# Patient Record
Sex: FEMALE | Race: WHITE | NOT HISPANIC OR LATINO | Employment: OTHER | URBAN - NONMETROPOLITAN AREA
[De-identification: names, ages, dates, MRNs, and addresses within clinical notes are randomized per-mention and may not be internally consistent; named-entity substitution may affect disease eponyms.]

---

## 2017-03-21 ENCOUNTER — TRANSFERRED RECORDS (OUTPATIENT)
Dept: HEALTH INFORMATION MANAGEMENT | Facility: HOSPITAL | Age: 82
End: 2017-03-21

## 2017-03-22 DIAGNOSIS — M54.50 LOW BACK PAIN: Primary | ICD-10-CM

## 2017-03-22 DIAGNOSIS — M54.16 LUMBAR RADICULOPATHY: ICD-10-CM

## 2017-03-23 ENCOUNTER — HOSPITAL ENCOUNTER (OUTPATIENT)
Dept: MRI IMAGING | Facility: HOSPITAL | Age: 82
Discharge: HOME | End: 2017-03-23
Attending: FAMILY MEDICINE | Admitting: FAMILY MEDICINE
Payer: MEDICARE

## 2017-03-23 PROCEDURE — 72148 MRI LUMBAR SPINE W/O DYE: CPT | Mod: TC

## 2017-03-24 DIAGNOSIS — M51.26 PROTRUDED LUMBAR DISC: Primary | ICD-10-CM

## 2017-03-27 ENCOUNTER — RESULTS ONLY (OUTPATIENT)
Dept: ADMINISTRATIVE | Facility: CLINIC | Age: 82
End: 2017-03-27

## 2017-03-27 ENCOUNTER — HOSPITAL ENCOUNTER (OUTPATIENT)
Dept: INTERVENTIONAL RADIOLOGY/VASCULAR | Facility: HOSPITAL | Age: 82
End: 2017-03-27
Attending: FAMILY MEDICINE
Payer: MEDICARE

## 2017-03-27 ENCOUNTER — HOSPITAL ENCOUNTER (OUTPATIENT)
Dept: MRI IMAGING | Facility: HOSPITAL | Age: 82
End: 2017-03-27
Attending: FAMILY MEDICINE
Payer: MEDICARE

## 2017-03-27 ENCOUNTER — HOSPITAL ENCOUNTER (OUTPATIENT)
Dept: GENERAL RADIOLOGY | Facility: HOSPITAL | Age: 82
Discharge: HOME | End: 2017-03-27
Attending: FAMILY MEDICINE | Admitting: FAMILY MEDICINE
Payer: MEDICARE

## 2017-03-27 DIAGNOSIS — M54.2 NECK PAIN: Primary | ICD-10-CM

## 2017-03-27 DIAGNOSIS — M54.12 CERVICAL RADICULOPATHY: ICD-10-CM

## 2017-03-27 PROCEDURE — 72141 MRI NECK SPINE W/O DYE: CPT | Mod: TC

## 2017-03-27 PROCEDURE — 99212 OFFICE O/P EST SF 10 MIN: CPT | Mod: TC | Performed by: RADIOLOGY

## 2017-03-27 NOTE — PROGRESS NOTES
"Pain Management Referral Consult    PATIENT HISTORICAL:    Patient Anatomy/region of concern:  lumbar    When and how did your pain begin?   Date 2 weeks ago  Eventno injury    Location of the pain? Lt side buttock pain, lt leg pain    Describe what pain feels like: constant  Does it interfere with daily activities? yes    What makes your pain better? Pain meds/ heating pad      Focal tenderness at one point along the length of a taut band (\"knotted muscle\")?  Yes    Restricted range of motion of the involved muscle or joint?  Yes    Did you have a previous injection series where you found relief of at least 3 months?  Yes  Have you had surgery in the area of pain?   No  If yes, when was the surgery?    What treatments have you already had for your pain?   Add length of time in weeks for all that apply.    Treatment Tried it--helped Tried it-- nohelp Made it worse   Pain clinic      Physical therapy      Chiropractic care      Spine injections  worked     Other nerve blocks      Surgery      Exercise      Others (list):                Injection Documentation of Provider History    PER-PROVIDER HISTORY, Dr. stacia lagos  Is this for treatment of acute herpes zoster, post herpetic neuralgia, post-decompressive radiculitis, or post-surgical scarring?   No  Does the patient have radiculopathy or sciatica?  Yes  How long has the patient had any physical therapy, home therapy or chiropractor care?  >4 weeks.  How long has the patient taken NSaids or muscle relaxants?  0 weeks.  Is there any history of systemic/local infection or unstable medical conditions?  No    "

## 2017-03-31 ENCOUNTER — HOSPITAL ENCOUNTER (OUTPATIENT)
Dept: INTERVENTIONAL RADIOLOGY/VASCULAR | Facility: HOSPITAL | Age: 82
End: 2017-03-31
Attending: FAMILY MEDICINE
Payer: MEDICARE

## 2017-03-31 DIAGNOSIS — M54.12 CERVICAL RADICULOPATHY: ICD-10-CM

## 2017-03-31 DIAGNOSIS — M54.2 NECK PAIN: Primary | ICD-10-CM

## 2017-03-31 PROCEDURE — 62321 NJX INTERLAMINAR CRV/THRC: CPT | Mod: TC

## 2017-03-31 PROCEDURE — 25000128 H RX IP 250 OP 636: Performed by: RADIOLOGY

## 2017-03-31 RX ORDER — METHYLPREDNISOLONE ACETATE 80 MG/ML
INJECTION, SUSPENSION INTRA-ARTICULAR; INTRALESIONAL; INTRAMUSCULAR; SOFT TISSUE
Status: DISCONTINUED
Start: 2017-03-31 | End: 2017-04-01 | Stop reason: HOSPADM

## 2017-03-31 RX ORDER — IOPAMIDOL 612 MG/ML
15 INJECTION, SOLUTION INTRATHECAL ONCE
Status: COMPLETED | OUTPATIENT
Start: 2017-03-31 | End: 2017-03-31

## 2017-03-31 RX ORDER — METHYLPREDNISOLONE ACETATE 80 MG/ML
80 INJECTION, SUSPENSION INTRA-ARTICULAR; INTRALESIONAL; INTRAMUSCULAR; SOFT TISSUE ONCE
Status: COMPLETED | OUTPATIENT
Start: 2017-03-31 | End: 2017-03-31

## 2017-03-31 RX ADMIN — METHYLPREDNISOLONE ACETATE 80 MG: 80 INJECTION, SUSPENSION INTRA-ARTICULAR; INTRALESIONAL; INTRAMUSCULAR; SOFT TISSUE at 13:02

## 2017-03-31 RX ADMIN — IOPAMIDOL 3 ML: 612 INJECTION, SOLUTION INTRATHECAL at 13:03

## 2017-03-31 NOTE — IP AVS SNAPSHOT
[facility]    [address] [place] [address]    Phone:  [phone]    Fax:  [phone]                                       After Visit Summary   3/31/2017    Kary Moody    MRN: [id number]           After Visit Summary Signature Page     I have received my discharge instructions, and my questions have been answered. I have discussed any challenges I see with this plan with the nurse or doctor.    ..........................................................................................................................................  Patient/Patient Representative Signature      ..........................................................................................................................................  Patient Representative Print Name and Relationship to Patient    ..................................................               ................................................  Date                                            Time    ..........................................................................................................................................  Reviewed by Signature/Title    ...................................................              ..............................................  Date                                                            Time

## 2017-03-31 NOTE — IP AVS SNAPSHOT
MRN:[id number]                      After Visit Summary   3/31/2017    Kary Moody    MRN: [id number]           Visit Information        Provider Department      3/31/2017 12:30 PM Radiologist, Need Interventional; [facility]           Review of your medicines      UNREVIEWED medicines. Ask your doctor about these medicines        Dose / Directions    ARMOUR THYROID 90 MG Tabs   Generic drug:  Thyroid        Dose:  75 mg   Take 75 mg by mouth daily   Refills:  0       aspirin 81 MG tablet        Dose:  81 mg   Take 81 mg by mouth daily   Refills:  0       FOLIC ACID PO        Dose:  1 mg   Take 1 mg by mouth 3 times daily   Refills:  0       magnesium hydroxide 400 MG/5ML suspension   Commonly known as:  MILK OF MAGNESIA   Used for:  Constipation, unspecified constipation type        Dose:  30 mL   Take 30 mLs by mouth daily as needed for constipation   Quantity:  105 mL   Refills:  0       METOPROLOL TARTRATE PO        Dose:  50 mg   Take 50 mg by mouth 2 times daily   Refills:  0       NITROTAB SL        Dose:  0.4 mg   Place 0.4 mg under the tongue every 5 minutes as needed for chest pain   Refills:  0       PLAVIX PO        Dose:  75 mg   Take 75 mg by mouth daily   Refills:  0       senna-docusate 8.6-50 MG per tablet   Commonly known as:  SENOKOT-S;PERICOLACE   Used for:  Constipation, unspecified constipation type        Dose:  1-2 tablet   Take 1-2 tablets by mouth 2 times daily as needed (constipation )   Quantity:  30 tablet   Refills:  0       valsartan-hydrochlorothiazide 320-25 MG per tablet   Commonly known as:  DIOVAN-HCT        Dose:  1 tablet   Take 1 tablet by mouth daily   Refills:  0       ZETIA PO        Dose:  10 mg   Take 10 mg by mouth At Bedtime   Refills:  0                Protect others around you: Learn how to safely use, store and throw away your medicines at www.disposemymeds.org.         Follow-ups after your visit       "   Care Instructions        Further instructions from your care team       Home number on file [phone] (home)  Is it ok to leave a message if you are not home yes    Dr Barros completed your ridge cervical procedure on 3/31/2017.    Current Pain Level (0-10 Scale): 6/10  Post Pain Level (0-10):  4/10    Patient will be contacted by a diagnostic imaging nurse via telephone for follow up on pain levels in 2 weeks.    Radiology Discharge instructions for Steroid Injection    Activity Level:     Do not do any heavy activity or exercise for 24 hours.   Do not drive for 4 hours after your injection.  Diet:   Return to your normal diet.  Medications:   If you have stopped taking your Aspirin, Coumadin/Warfarin, Ibuprofen, or any   other blood thinner for this procedure you may resume in the morning unless   your primary care provider has given you other instructions.    Diabetics may see an increase in blood sugar after steroid injections. If you are concerned about your blood sugar, please contact your family doctor.    Site Care:  Remove the bandage and bathe or shower the morning after the procedure.      Call your Primary Care Provider if you have the following (if your primary care provider is not available please seek emergency care):   Nausea with vomiting   Severe headache   Drowsiness or confusion   Redness or drainage at the injection or puncture site   Temperature over 101 degrees F   Other concerns   Worsening back pain   Stiff neck       Additional Information About Your Visit        Show[employer] Information     [employer] lets you send messages to your doctor, view your test results, renew your prescriptions, schedule appointments and more. To sign up, go to [email or web address] . Click on \"Log in\" on the left side of the screen, which will take you to the Welcome page. Then click on \"Sign up Now\" on the right side of the page.     You will be asked to enter the access code listed below, as well as some " personal information. Please follow the directions to create your username and password.     Your access code is: [email or web address]  Expires: 2017 12:53 PM     Your access code will  in 90 days. If you need help or a new code, please call your [facility] clinic or [phone].        Care EveryWhere ID     This is your Care EveryWhere ID. This could be used by other organizations to access your [facility] medical records  [id number]         Primary Care Provider Office Phone # Fax #    Khadra Tariq [phone]      Thank you!     Thank you for choosing [facility] for your care. Our goal is always to provide you with excellent care. Hearing back from our patients is one way we can continue to improve our services. Please take a few minutes to complete the written survey that you may receive in the mail after you visit with us. Thank you!             Medication List: This is a list of all your medications and when to take them. Check marks below indicate your daily home schedule. Keep this list as a reference.      Medications           Morning Afternoon Evening Bedtime As Needed    ARMOUR THYROID 90 MG Tabs   Take 75 mg by mouth daily   Generic drug:  Thyroid                                aspirin 81 MG tablet   Take 81 mg by mouth daily                                FOLIC ACID PO   Take 1 mg by mouth 3 times daily                                magnesium hydroxide 400 MG/5ML suspension   Commonly known as:  MILK OF MAGNESIA   Take 30 mLs by mouth daily as needed for constipation                                METOPROLOL TARTRATE PO   Take 50 mg by mouth 2 times daily                                NITROTAB SL   Place 0.4 mg under the tongue every 5 minutes as needed for chest pain                                PLAVIX PO   Take 75 mg by mouth daily                                senna-docusate 8.6-50 MG per tablet   Commonly known as:  SENOKOT-S;PERICOLACE   Take 1-2 tablets by mouth 2  times daily as needed (constipation )                                valsartan-hydrochlorothiazide 320-25 MG per tablet   Commonly known as:  DIOVAN-HCT   Take 1 tablet by mouth daily                                ZETIA PO   Take 10 mg by mouth At Bedtime

## 2017-03-31 NOTE — PROGRESS NOTES
Fluoro time for this case was 28 seconds, less than 5 min  Was patient held? NO  If yes, by whom? NA

## 2017-03-31 NOTE — DISCHARGE INSTRUCTIONS
Home number on file [phone] (home)  Is it ok to leave a message if you are not home yes    Dr Barros completed your ridge cervical procedure on 3/31/2017.    Current Pain Level (0-10 Scale): 6/10  Post Pain Level (0-10):  4/10    Patient will be contacted by a diagnostic imaging nurse via telephone for follow up on pain levels in 2 weeks.    Radiology Discharge instructions for Steroid Injection    Activity Level:     Do not do any heavy activity or exercise for 24 hours.   Do not drive for 4 hours after your injection.  Diet:   Return to your normal diet.  Medications:   If you have stopped taking your Aspirin, Coumadin/Warfarin, Ibuprofen, or any   other blood thinner for this procedure you may resume in the morning unless   your primary care provider has given you other instructions.    Diabetics may see an increase in blood sugar after steroid injections. If you are concerned about your blood sugar, please contact your family doctor.    Site Care:  Remove the bandage and bathe or shower the morning after the procedure.      Call your Primary Care Provider if you have the following (if your primary care provider is not available please seek emergency care):   Nausea with vomiting   Severe headache   Drowsiness or confusion   Redness or drainage at the injection or puncture site   Temperature over 101 degrees F   Other concerns   Worsening back pain   Stiff neck

## 2017-04-13 ENCOUNTER — TELEPHONE (OUTPATIENT)
Dept: INTERVENTIONAL RADIOLOGY/VASCULAR | Facility: HOSPITAL | Age: 82
End: 2017-04-13

## 2017-04-15 ENCOUNTER — HOSPITAL ENCOUNTER (EMERGENCY)
Facility: HOSPITAL | Age: 82
Discharge: HOME | End: 2017-04-15
Attending: PHYSICIAN ASSISTANT | Admitting: PHYSICIAN ASSISTANT
Payer: MEDICARE

## 2017-04-15 VITALS
TEMPERATURE: 97.6 F | RESPIRATION RATE: 16 BRPM | HEIGHT: 62 IN | WEIGHT: 172 LBS | SYSTOLIC BLOOD PRESSURE: 142 MMHG | OXYGEN SATURATION: 98 % | DIASTOLIC BLOOD PRESSURE: 79 MMHG | BODY MASS INDEX: 31.65 KG/M2

## 2017-04-15 DIAGNOSIS — R46.89 COGNITIVE AND BEHAVIORAL CHANGES: ICD-10-CM

## 2017-04-15 DIAGNOSIS — R41.3 MEMORY CHANGES: ICD-10-CM

## 2017-04-15 DIAGNOSIS — R41.89 COGNITIVE AND BEHAVIORAL CHANGES: ICD-10-CM

## 2017-04-15 LAB
ALBUMIN SERPL-MCNC: 3.2 G/DL (ref 3.4–5)
ALBUMIN UR-MCNC: NEGATIVE MG/DL
ALP SERPL-CCNC: 76 U/L (ref 40–150)
ALT SERPL W P-5'-P-CCNC: 31 U/L (ref 0–50)
ANION GAP SERPL CALCULATED.3IONS-SCNC: 10 MMOL/L (ref 3–14)
APPEARANCE UR: CLEAR
AST SERPL W P-5'-P-CCNC: 26 U/L (ref 0–45)
BACTERIA #/AREA URNS HPF: ABNORMAL /HPF
BASOPHILS # BLD AUTO: 0 10E9/L (ref 0–0.2)
BASOPHILS NFR BLD AUTO: 0.1 %
BILIRUB SERPL-MCNC: 0.5 MG/DL (ref 0.2–1.3)
BILIRUB UR QL STRIP: NEGATIVE
BUN SERPL-MCNC: 10 MG/DL (ref 7–30)
CALCIUM SERPL-MCNC: 8.9 MG/DL (ref 8.5–10.1)
CHLORIDE SERPL-SCNC: 94 MMOL/L (ref 94–109)
CO2 SERPL-SCNC: 27 MMOL/L (ref 20–32)
COLOR UR AUTO: ABNORMAL
CREAT SERPL-MCNC: 0.66 MG/DL (ref 0.52–1.04)
DIFFERENTIAL METHOD BLD: ABNORMAL
EOSINOPHIL # BLD AUTO: 0 10E9/L (ref 0–0.7)
EOSINOPHIL NFR BLD AUTO: 0.6 %
ERYTHROCYTE [DISTWIDTH] IN BLOOD BY AUTOMATED COUNT: 13.2 % (ref 10–15)
GFR SERPL CREATININE-BSD FRML MDRD: 85 ML/MIN/1.7M2
GLUCOSE SERPL-MCNC: 147 MG/DL (ref 70–99)
GLUCOSE UR STRIP-MCNC: NEGATIVE MG/DL
HCT VFR BLD AUTO: 30.5 % (ref 35–47)
HGB BLD-MCNC: 10.6 G/DL (ref 11.7–15.7)
HGB UR QL STRIP: NEGATIVE
IMM GRANULOCYTES # BLD: 0.1 10E9/L (ref 0–0.4)
IMM GRANULOCYTES NFR BLD: 0.7 %
KETONES UR STRIP-MCNC: NEGATIVE MG/DL
LEUKOCYTE ESTERASE UR QL STRIP: NEGATIVE
LYMPHOCYTES # BLD AUTO: 1.3 10E9/L (ref 0.8–5.3)
LYMPHOCYTES NFR BLD AUTO: 18 %
MCH RBC QN AUTO: 28.5 PG (ref 26.5–33)
MCHC RBC AUTO-ENTMCNC: 34.8 G/DL (ref 31.5–36.5)
MCV RBC AUTO: 82 FL (ref 78–100)
MONOCYTES # BLD AUTO: 1.2 10E9/L (ref 0–1.3)
MONOCYTES NFR BLD AUTO: 17.3 %
NEUTROPHILS # BLD AUTO: 4.4 10E9/L (ref 1.6–8.3)
NEUTROPHILS NFR BLD AUTO: 63.3 %
NITRATE UR QL: NEGATIVE
NRBC # BLD AUTO: 0 10*3/UL
NRBC BLD AUTO-RTO: 0 /100
PH UR STRIP: 7 PH (ref 4.7–8)
PLATELET # BLD AUTO: 165 10E9/L (ref 150–450)
POTASSIUM SERPL-SCNC: 3.9 MMOL/L (ref 3.4–5.3)
PROT SERPL-MCNC: 6.6 G/DL (ref 6.8–8.8)
RBC # BLD AUTO: 3.72 10E12/L (ref 3.8–5.2)
RBC #/AREA URNS AUTO: 0 /HPF (ref 0–2)
SODIUM SERPL-SCNC: 131 MMOL/L (ref 133–144)
SP GR UR STRIP: 1 (ref 1–1.03)
TSH SERPL DL<=0.05 MIU/L-ACNC: 0.66 MU/L (ref 0.4–4)
URN SPEC COLLECT METH UR: ABNORMAL
UROBILINOGEN UR STRIP-MCNC: NORMAL MG/DL (ref 0–2)
WBC # BLD AUTO: 7 10E9/L (ref 4–11)
WBC #/AREA URNS AUTO: <1 /HPF (ref 0–2)

## 2017-04-15 PROCEDURE — 99284 EMERGENCY DEPT VISIT MOD MDM: CPT | Mod: 25

## 2017-04-15 PROCEDURE — 85025 COMPLETE CBC W/AUTO DIFF WBC: CPT | Performed by: PHYSICIAN ASSISTANT

## 2017-04-15 PROCEDURE — 70450 CT HEAD/BRAIN W/O DYE: CPT | Mod: TC

## 2017-04-15 PROCEDURE — 84443 ASSAY THYROID STIM HORMONE: CPT | Performed by: PHYSICIAN ASSISTANT

## 2017-04-15 PROCEDURE — 36415 COLL VENOUS BLD VENIPUNCTURE: CPT | Performed by: PHYSICIAN ASSISTANT

## 2017-04-15 PROCEDURE — 99284 EMERGENCY DEPT VISIT MOD MDM: CPT | Performed by: PHYSICIAN ASSISTANT

## 2017-04-15 PROCEDURE — 81001 URINALYSIS AUTO W/SCOPE: CPT | Performed by: PHYSICIAN ASSISTANT

## 2017-04-15 PROCEDURE — 80053 COMPREHEN METABOLIC PANEL: CPT | Performed by: PHYSICIAN ASSISTANT

## 2017-04-15 ASSESSMENT — ENCOUNTER SYMPTOMS
ABDOMINAL PAIN: 0
LIGHT-HEADEDNESS: 0
CONFUSION: 1
CHILLS: 0
SHORTNESS OF BREATH: 0
HEADACHES: 0
AGITATION: 1
SLEEP DISTURBANCE: 0
DIZZINESS: 0
NERVOUS/ANXIOUS: 0
COUGH: 0
FEVER: 0
HALLUCINATIONS: 0

## 2017-04-15 NOTE — ED PROVIDER NOTES
"  History     Chief Complaint   Patient presents with     Confusion     The history is provided by the patient, the spouse and a relative.     Kary Moody is a 83 year old female who presented to the ED along with family for evaluation of some recent confusion and abnormal behavior.   reports that over the last few weeks, Kary's short term memory has been slipping.  Daughter reports that last night and this morning they noticed that she could not recall simple things such as her son's name and other family names.  She could not work a puzzle piece.  She was hiding from her son, etc.  No falls. No recent illness.  She does have a history of CVA and is currently on Plavix and ASA.  During my interview, Kary was pleasant and talkative.  She seemed overly flirtatious.  She denied any concerns to me other than that she could not recall a granddaughter's name.  Kary tells me a hx of CVA and TIA as well.          I have reviewed the Medications, Allergies, Past Medical and Surgical History, and Social History in the Epic system.    Review of Systems   Constitutional: Negative for chills and fever.   Respiratory: Negative for cough and shortness of breath.    Gastrointestinal: Negative for abdominal pain.   Skin: Negative.    Neurological: Negative for dizziness, light-headedness and headaches.   Psychiatric/Behavioral: Positive for agitation, behavioral problems and confusion. Negative for hallucinations, sleep disturbance and suicidal ideas. The patient is not nervous/anxious.        Physical Exam   BP: 156/66  Heart Rate: 76  Temp: 97.6  F (36.4  C)  Resp: 16  Height: 157.5 cm (5' 2\")  Weight: 78 kg (172 lb)  SpO2: 98 %  Physical Exam   Constitutional: She is oriented to person, place, and time. She appears well-developed and well-nourished. No distress.   Pleasant and talkative    Cardiovascular: Normal rate and regular rhythm.    Pulmonary/Chest: Effort normal.   Abdominal: Soft. "   Neurological: She is alert and oriented to person, place, and time.   No focal concerns.  Normal speech.  No drift.  Normal gait.     Skin: Skin is warm and dry.   Psychiatric: She has a normal mood and affect.   Nursing note and vitals reviewed.      ED Course     ED Course     Procedures        Medications - No data to display     Results for orders placed or performed during the hospital encounter of 04/15/17 (from the past 24 hour(s))   CBC with platelets differential   Result Value Ref Range    WBC 7.0 4.0 - 11.0 10e9/L    RBC Count 3.72 (L) 3.8 - 5.2 10e12/L    Hemoglobin 10.6 (L) 11.7 - 15.7 g/dL    Hematocrit 30.5 (L) 35.0 - 47.0 %    MCV 82 78 - 100 fl    MCH 28.5 26.5 - 33.0 pg    MCHC 34.8 31.5 - 36.5 g/dL    RDW 13.2 10.0 - 15.0 %    Platelet Count 165 150 - 450 10e9/L    Diff Method Automated Method     % Neutrophils 63.3 %    % Lymphocytes 18.0 %    % Monocytes 17.3 %    % Eosinophils 0.6 %    % Basophils 0.1 %    % Immature Granulocytes 0.7 %    Nucleated RBCs 0 0 /100    Absolute Neutrophil 4.4 1.6 - 8.3 10e9/L    Absolute Lymphocytes 1.3 0.8 - 5.3 10e9/L    Absolute Monocytes 1.2 0.0 - 1.3 10e9/L    Absolute Eosinophils 0.0 0.0 - 0.7 10e9/L    Absolute Basophils 0.0 0.0 - 0.2 10e9/L    Abs Immature Granulocytes 0.1 0 - 0.4 10e9/L    Absolute Nucleated RBC 0.0    Comprehensive metabolic panel   Result Value Ref Range    Sodium 131 (L) 133 - 144 mmol/L    Potassium 3.9 3.4 - 5.3 mmol/L    Chloride 94 94 - 109 mmol/L    Carbon Dioxide 27 20 - 32 mmol/L    Anion Gap 10 3 - 14 mmol/L    Glucose 147 (H) 70 - 99 mg/dL    Urea Nitrogen 10 7 - 30 mg/dL    Creatinine 0.66 0.52 - 1.04 mg/dL    GFR Estimate 85 >60 mL/min/1.7m2    GFR Estimate If Black >90   GFR Calc   >60 mL/min/1.7m2    Calcium 8.9 8.5 - 10.1 mg/dL    Bilirubin Total 0.5 0.2 - 1.3 mg/dL    Albumin 3.2 (L) 3.4 - 5.0 g/dL    Protein Total 6.6 (L) 6.8 - 8.8 g/dL    Alkaline Phosphatase 76 40 - 150 U/L    ALT 31 0 - 50 U/L    AST  26 0 - 45 U/L   TSH   Result Value Ref Range    TSH 0.66 0.40 - 4.00 mU/L   UA reflex to Microscopic   Result Value Ref Range    Color Urine Light Yellow     Appearance Urine Clear     Glucose Urine Negative NEG mg/dL    Bilirubin Urine Negative NEG    Ketones Urine Negative NEG mg/dL    Specific Gravity Urine 1.003 1.003 - 1.035    Blood Urine Negative NEG    pH Urine 7.0 4.7 - 8.0 pH    Protein Albumin Urine Negative NEG mg/dL    Urobilinogen mg/dL Normal 0.0 - 2.0 mg/dL    Nitrite Urine Negative NEG    Leukocyte Esterase Urine Negative NEG    Source Midstream Urine     RBC Urine 0 0 - 2 /HPF    WBC Urine <1 0 - 2 /HPF    Bacteria Urine None (A) NEG /HPF        Critical Care time:  none               Labs Ordered and Resulted from Time of ED Arrival Up to the Time of Departure from the ED   CBC WITH PLATELETS DIFFERENTIAL - Abnormal; Notable for the following:        Result Value    RBC Count 3.72 (*)     Hemoglobin 10.6 (*)     Hematocrit 30.5 (*)     All other components within normal limits   COMPREHENSIVE METABOLIC PANEL - Abnormal; Notable for the following:     Sodium 131 (*)     Glucose 147 (*)     Albumin 3.2 (*)     Protein Total 6.6 (*)     All other components within normal limits   URINE MACROSCOPIC WITH REFLEX TO MICRO - Abnormal; Notable for the following:     Bacteria Urine None (*)     All other components within normal limits   TSH       Assessments & Plan (with Medical Decision Making)   Alzheimer's clock was abnormal.  Minute hand not at 12.  Mini Mental was an 18.  CT head shows chronic changes.  Symptoms are likely secondary to neurodegeneration.  Daughter does not live in town and is up visiting.  Symptoms have likely been more progressive than daughter believes. Kary lives with her  and has other family close.  She feels safe at home.  I doubt this could be from CVAs or the like but is already on Plavix and ASA.  No emergent concerns.  I would not consider her vulnerable at this  point.  I stressed the importance of proper clinic follow-up.  I also stressed the she return HERE for ANY worsening symptoms or other concerns whatsoever.  Ms. Moody voiced complete understanding and were agreeable. She was discharged in stable condition, ambulatory without assistance.     I have reviewed the nursing notes.    I have reviewed the findings, diagnosis, plan and need for follow up with the patient.    New Prescriptions    No medications on file       Final diagnoses:   Memory changes   Cognitive and behavioral changes       4/15/2017   [facility]     Altaf Uribe PA-C  04/15/17 2935

## 2017-04-15 NOTE — ED NOTES
"Ambulated to bathroom independently with this nurse at side. Explained to patient in detail to use 3 wipes. Patient repeatedly stated, \"I know, I know, three wipes, I know.\" After patient had not used wipes. Patient stated, \"I'm sorry about the wipes but I have more proof of who I am, this beautiful wedding ring my  got me.\" Ambulated back to room. Gait steady. Daughter and  remain at bedside.  "

## 2017-04-15 NOTE — ED AVS SNAPSHOT
[facility]    [address] [address]    Phone:  [phone]                                       Kary Moody   MRN: [id number]    Department:  [facility]   Date of Visit:  4/15/2017           After Visit Summary Signature Page     I have received my discharge instructions, and my questions have been answered. I have discussed any challenges I see with this plan with the nurse or doctor.    ..........................................................................................................................................  Patient/Patient Representative Signature      ..........................................................................................................................................  Patient Representative Print Name and Relationship to Patient    ..................................................               ................................................  Date                                            Time    ..........................................................................................................................................  Reviewed by Signature/Title    ...................................................              ..............................................  Date                                                            Time

## 2017-04-15 NOTE — ED AVS SNAPSHOT
[facility]    [address] [address]    Phone:  [phone]                                       Kary Moody   MRN: [id number]    Department:  [facility]   Date of Visit:  4/15/2017           Patient Information     Date Of Birth          11/8/1933        Your diagnoses for this visit were:     Memory changes     Cognitive and behavioral changes        You were seen by Altaf Uribe PA-C.      Follow-up Information     Schedule an appointment as soon as possible for a visit with Khadra Tariq MD.    Specialty:  Family Practice    Contact information:    [facility]  [address] [facility] [address] [phone]          Follow up with [facility].    Specialty:  EMERGENCY MEDICINE    Why:  If symptoms worsen    Contact information:    [address] [address] [phone]    Additional information:    From [place]: Take [address]. Turn left at [address]. Turn left at the first stoplight on [address]. At the first stop sign, take a right onto [address]. Take a left into the parking lot and continue through until you reach the North enterance of the building.       From [place]: Take [address]. Take the [address] ramp towards [place]. Turn left onto [address]. Take a slight right onto [address]. Turn left at the first stoplight on [address]. At the first stop sign, take a right onto [address]. Take a left into the parking lot and continue through until you reach the North enterance of the building.       From [place]: Take [address]. Take a right at [address]. At the first stop sign, take a right onto [address]. Take a left into the parking lot and continue through until you reach the North enterance of the building.         Discharge Instructions       As we discussed, your labs today were all  pretty good.  The CT scan showed chronic microvascular changes.      I don't know what caused your symptoms over the last few days, but it may be secondary to brain changes over time such as Alzheimer's disease or the like.     You are currently on 2 blood thinning medications and there is no reasonable indication to start a different medications or to stop current medications.      Please follow-up with Dr. Tariq early next week for recheck.    Please return HERE for ANY worsening symptoms or other concerns whatsoever.     I hope you can have a good Easter.       Discharge References/Attachments     CONFUSION (ENGLISH)    COPING WITH MEMORY LOSS (ENGLISH)         Review of your medicines      Our records show that you are taking the medicines listed below. If these are incorrect, please call your family doctor or clinic.        Dose / Directions Last dose taken    ARMOUR THYROID 90 MG Tabs   Dose:  75 mg   Generic drug:  Thyroid        Take 75 mg by mouth daily   Refills:  0        aspirin 81 MG tablet   Dose:  81 mg        Take 81 mg by mouth daily   Refills:  0        FOLIC ACID PO   Dose:  1 mg        Take 1 mg by mouth 3 times daily   Refills:  0        magnesium hydroxide 400 MG/5ML suspension   Commonly known as:  MILK OF MAGNESIA   Dose:  30 mL   Quantity:  105 mL        Take 30 mLs by mouth daily as needed for constipation   Refills:  0        METOPROLOL TARTRATE PO   Dose:  50 mg        Take 50 mg by mouth 2 times daily   Refills:  0        NITROTAB SL   Dose:  0.4 mg        Place 0.4 mg under the tongue every 5 minutes as needed for chest pain   Refills:  0        PLAVIX PO   Dose:  75 mg        Take 75 mg by mouth daily   Refills:  0        senna-docusate 8.6-50 MG per tablet   Commonly known as:  SENOKOT-S;PERICOLACE   Dose:  1-2 tablet   Quantity:  30 tablet        Take 1-2 tablets by mouth 2 times daily as needed (constipation )   Refills:  0                Procedures and tests performed  "during your visit     CBC with platelets differential    CT Head w/o Contrast    Comprehensive metabolic panel    TSH    UA reflex to Microscopic      Orders Needing Specimen Collection     None      Pending Results     Date and Time Order Name Status Description    4/15/2017 1147 CT Head w/o Contrast In process             Pending Culture Results     No orders found from 2017 to 2017.            Thank you for choosing [facility]       Thank you for choosing [facility] for your care. Our goal is always to provide you with excellent care. Hearing back from our patients is one way we can continue to improve our services. Please take a few minutes to complete the written survey that you may receive in the mail after you visit with us. Thank you!        [email or web address] Information     [facility] lets you send messages to your doctor, view your test results, renew your prescriptions, schedule appointments and more. To sign up, go to [email or web address] . Click on \"Log in\" on the left side of the screen, which will take you to the Welcome page. Then click on \"Sign up Now\" on the right side of the page.     You will be asked to enter the access code listed below, as well as some personal information. Please follow the directions to create your username and password.     Your access code is: [email or web address]  Expires: 2017 12:53 PM     Your access code will  in 90 days. If you need help or a new code, please call your [facility] clinic or [phone].        Care EveryWhere ID     This is your Care EveryWhere ID. This could be used by other organizations to access your [facility] medical records  [id number]        After Visit Summary       This is your record. Keep this with you and show to your community pharmacist(s) and doctor(s) at your next visit.                  "

## 2017-04-15 NOTE — DISCHARGE INSTRUCTIONS
As we discussed, your labs today were all pretty good.  The CT scan showed chronic microvascular changes.      I don't know what caused your symptoms over the last few days, but it may be secondary to brain changes over time such as Alzheimer's disease or the like.     You are currently on 2 blood thinning medications and there is no reasonable indication to start a different medications or to stop current medications.      Please follow-up with Dr. Tariq early next week for recheck.    Please return HERE for ANY worsening symptoms or other concerns whatsoever.     I hope you can have a good Easter.

## 2017-04-15 NOTE — ED NOTES
Family brought pt in to be checked out, reported that over the last few months she has been having memory issues and have been getting progressively worse.  They report some long term memory is intact but short term memory she's been confused-example patient couldn't remember her kids names and couldn't remember a puzzle piece. Patient answered all question approp in triage.  Denied pain-was alert and ornt.  FAST negative.

## 2017-04-15 NOTE — ED NOTES
Pt has left with out instructions or vitals.  Daughter returned for instructions and instructions given.  Encouraged to return if worse.

## 2017-04-15 NOTE — ED NOTES
"Ambulated into ED room 7 independently with family at side. Family states patient started to be very confused one hour ago but is now getting better. Daughter states she went for a 20 minute walk and when she returned patient had taken her morning medications and did not know who her children were, what cars were, what puzzle pieces were, and what day it was. Patient is also showing anger and child like behaviors. Stated just now, \"I want to know why that med was hidden right now, tell me now or I'll scream.\" Neuros WNL. FAST exam negative.  and daughter at bedside and both state she is doing better now but the confusion one hour ago and the anger was not at all like her. Call light within reach.  "

## 2017-04-17 ENCOUNTER — HOSPITAL ENCOUNTER (EMERGENCY)
Facility: HOSPITAL | Age: 82
Discharge: HOME | End: 2017-04-17
Attending: EMERGENCY MEDICINE | Admitting: EMERGENCY MEDICINE
Payer: MEDICARE

## 2017-04-17 VITALS
RESPIRATION RATE: 18 BRPM | DIASTOLIC BLOOD PRESSURE: 66 MMHG | OXYGEN SATURATION: 98 % | TEMPERATURE: 97.3 F | SYSTOLIC BLOOD PRESSURE: 138 MMHG | HEART RATE: 92 BPM

## 2017-04-17 DIAGNOSIS — D64.9 NORMOCYTIC ANEMIA: ICD-10-CM

## 2017-04-17 DIAGNOSIS — R46.89 BEHAVIORAL CHANGE: ICD-10-CM

## 2017-04-17 DIAGNOSIS — E87.1 HYPONATREMIA: ICD-10-CM

## 2017-04-17 PROCEDURE — 99283 EMERGENCY DEPT VISIT LOW MDM: CPT

## 2017-04-17 PROCEDURE — 99283 EMERGENCY DEPT VISIT LOW MDM: CPT | Performed by: EMERGENCY MEDICINE

## 2017-04-17 RX ORDER — POLYETHYLENE GLYCOL 3350 17 G/17G
1 POWDER, FOR SOLUTION ORAL DAILY
COMMUNITY

## 2017-04-17 NOTE — PROGRESS NOTES
CT Head w/o Contrast report routed to PCP, Dr. CHON Tariq.  Impression - white matter changes are similar in appearance suggesting small-vessel disease.  Air-fluid level in the right sphenoid locule suggesting sinusitis.  Pt was seen in ED on 4/15 for confusion.  Provider stressed the importance of following up with PCP.

## 2017-04-18 ASSESSMENT — ENCOUNTER SYMPTOMS
TREMORS: 0
SEIZURES: 0
FEVER: 0
SPEECH DIFFICULTY: 0
SLEEP DISTURBANCE: 1
CARDIOVASCULAR NEGATIVE: 1
CHILLS: 0
FACIAL ASYMMETRY: 0
RESPIRATORY NEGATIVE: 1
COLOR CHANGE: 0
CONFUSION: 1
HEADACHES: 0
ACTIVITY CHANGE: 1
NERVOUS/ANXIOUS: 0
HALLUCINATIONS: 0
HYPERACTIVE: 1
APPETITE CHANGE: 0

## 2017-04-18 NOTE — ED PROVIDER NOTES
History     Chief Complaint   Patient presents with     Altered Mental Status     HPI Comments: 21.02  Brought in by her daughter, along with her     Chief complaint  Altered behavior    History of present illness  83-year-old female who lives her , over the last 3 months as been exhibiting some bizarre behavior.  Most of the time she is lucid but she'll have spells where she will yell out or talking nonsensically.  She had a 30 min. episode this afternoon about 3 PM and then in the evening had an episode lasting about 45 min.  [facility] and had to be tracked down, she fled to her daughter's house nearby.     noted that when she was playing cards she was he nonsensical plays and not following the rules properly.  She clearly wasn't able to think clearly.    Seen in the emergency 2 days ago for the same complaints, laboratory investigation showed minimal depression of sodium, normocytic anemia, and CT scan was done which showed diffuse changes.  No acute disease.    She has a followup appointment in 2 days' time with her primary care provider.    The history is provided by the patient, the spouse and a relative. The history is limited by the condition of the patient.     History reviewed. No pertinent past medical history.  History reviewed. No pertinent surgical history.     Allergies   Allergen Reactions     Atorvastatin      Celebrex [Celecoxib] Nausea and Vomiting     Codeine Phosphate      Flu Virus Vaccine      Ibuprofen Sodium      Latex Hives     Lisinopril Cough     Penicillins      Welchol [Colesevelam]      Niaspan [Niacin] Rash     Upset Stomach     No current facility-administered medications for this encounter.      Current Outpatient Prescriptions   Medication     polyethylene glycol (MIRALAX/GLYCOLAX) Packet     magnesium hydroxide (MILK OF MAGNESIA) 400 MG/5ML suspension     aspirin 81 MG tablet     FOLIC ACID PO     Thyroid (ARMOUR THYROID) 90  MG TABS     METOPROLOL TARTRATE PO     Clopidogrel Bisulfate (PLAVIX PO)     Nitroglycerin (NITROTAB SL)         Review of Systems   Constitutional: Positive for activity change. Negative for appetite change, chills and fever.   HENT: Negative.    Respiratory: Negative.    Cardiovascular: Negative.    Genitourinary: Negative.    Skin: Negative for color change and rash.   Neurological: Negative for tremors, seizures, syncope, facial asymmetry, speech difficulty and headaches.   Psychiatric/Behavioral: Positive for confusion and sleep disturbance. Negative for hallucinations, self-injury and suicidal ideas. The patient is hyperactive. The patient is not nervous/anxious.        Physical Exam   BP: 138/81  Pulse: 92  Heart Rate: 73  Temp: 97.3  F (36.3  C)  Resp: 18  SpO2: 96 %  Physical Exam   Constitutional: She is oriented to person, place, and time. She appears well-developed and well-nourished. No distress.   Alert, no acute distress  Vital signs are normal  No difficulty speaking or breathing  Almost flirtatious in her behavior, quite jovial   HENT:   Head: Normocephalic.   Right Ear: External ear normal.   Left Ear: External ear normal.   Mouth/Throat: Oropharynx is clear and moist. No oropharyngeal exudate.   Nasal congestion  Tympanic membranes normalOropharynx normal   Eyes: Conjunctivae are normal. Pupils are equal, round, and reactive to light.   Neck: Neck supple.   Cardiovascular: Normal rate, regular rhythm and intact distal pulses.    No murmur heard.  Pulmonary/Chest: Breath sounds normal. No respiratory distress. She has no wheezes. She has no rales.   Abdominal: Soft. There is no tenderness.   Musculoskeletal: She exhibits no edema.   Lymphadenopathy:     She has no cervical adenopathy.   Neurological: She is oriented to person, place, and time. She exhibits normal muscle tone. Coordination normal.   Normal finger-nose-finger testing  Normal rapid or any movements  No facial droop  No motor weakness  equal strength upper limbs  Romberg initially negative but then started to lose her balance but opened her eyes and corrected.  Gait is unsteady somewhat ataxic, unable to heel walk    Mini-Mental Status 2230 which is better than 2 days ago when she did 18   Skin: No rash noted. She is not diaphoretic. No erythema. No pallor.   Psychiatric: She has a normal mood and affect.   Nursing note and vitals reviewed.      ED Course     ED Course     Procedures        examination, history, Mini-Mental status exam  Review of old records       Labs Ordered and Resulted from Time of ED Arrival Up to the Time of Departure from the ED - No data to display    Assessments & Plan (with Medical Decision Making)     83-year-old female with 3 weeks ofAltered behavior in episodes.  Ev between episodes of yelling and restlessness, she is not her usual self, there is a jaci lpersonality change  Examination shows no acute neurological disorder of motor or sensory function or even coordination but she does have definite cognitive decline.  Likely a neurodegenerative process.  MRI may be helpful  Referral to neurology or sacral geriatrician would be the most helpful.    I have reviewed the nursing notes.    I have reviewed the findings, diagnosis, plan and need for follow up with the patient.    Discharge Medication List as of 4/18/2017  2:42 AM          Final diagnoses:   Behavioral change   Normocytic anemia   Hyponatremia     followup 2 days with primary care as arranged, return to emergency if dangerous activity occurs or if not settling down    4/17/2017   [facility]     Abelino Espinoza MD  04/18/17 0512       Abelino Espinoza MD  04/18/17 0523

## 2017-04-18 NOTE — DOWNTIME EVENT NOTE
The EMR was down for 5.75 hours on 4/18/2017.    Bri Cheung RN was responsible for completing the paper charting during this time period.     The following information was re-entered into the system by Neema Doyle:     The following information will remain in the paper chart: Discharge note/instructions and VS    Neema Doyle  4/18/2017

## 2017-04-18 NOTE — ED NOTES
Patient presents accompanied with spouse and daughter with complaint of changes in level of consciousness. Pt's daughter reports patient has been having episodes where she hallucinates and yells out, which has been happening for about the last 3 weeks. Patient was seen here in the ED on Saturday where scanning was obtained, patient was to follow up this Wednesday with Dr. Chamberlain, however family felt the episode tonight was worse. Currently the patient is alert and oriented, does not complain of any pain. Call light within reach.

## 2017-04-20 ENCOUNTER — MEDICAL CORRESPONDENCE (OUTPATIENT)
Dept: HEALTH INFORMATION MANAGEMENT | Facility: HOSPITAL | Age: 82
End: 2017-04-20

## 2017-04-21 DIAGNOSIS — F05 ACUTE CONFUSIONAL STATE: Primary | ICD-10-CM

## 2018-06-14 ENCOUNTER — TRANSFERRED RECORDS (OUTPATIENT)
Dept: HEALTH INFORMATION MANAGEMENT | Facility: CLINIC | Age: 83
End: 2018-06-14

## 2018-06-14 LAB
ALT SERPL-CCNC: 22 U/L (ref 18–65)
AST SERPL-CCNC: 20 U/L (ref 10–30)
CHOLEST SERPL-MCNC: 170 MG/DL
CREAT SERPL-MCNC: 0.85 MG/DL (ref 0.7–1.2)
GLUCOSE SERPL-MCNC: 96 MG/DL (ref 60–99)
HDLC SERPL-MCNC: 31 MG/DL
LDLC SERPL CALC-MCNC: 99 MG/DL
POTASSIUM SERPL-SCNC: 4.3 MEQ/L (ref 3.5–5.1)
TRIGL SERPL-MCNC: 202 MG/DL
TSH SERPL-ACNC: 1.18 UIU/ML (ref 0.35–4.8)

## 2018-08-02 ENCOUNTER — APPOINTMENT (OUTPATIENT)
Dept: GENERAL RADIOLOGY | Facility: HOSPITAL | Age: 83
DRG: 280 | End: 2018-08-02
Attending: PHYSICIAN ASSISTANT
Payer: MEDICARE

## 2018-08-02 ENCOUNTER — APPOINTMENT (OUTPATIENT)
Dept: CT IMAGING | Facility: HOSPITAL | Age: 83
DRG: 280 | End: 2018-08-02
Attending: PHYSICIAN ASSISTANT
Payer: MEDICARE

## 2018-08-02 ENCOUNTER — HOSPITAL ENCOUNTER (INPATIENT)
Facility: HOSPITAL | Age: 83
LOS: 1 days | Discharge: ANOTHER HOSPITAL | DRG: 280 | End: 2018-08-02
Attending: PHYSICIAN ASSISTANT | Admitting: INTERNAL MEDICINE
Payer: MEDICARE

## 2018-08-02 VITALS
HEART RATE: 109 BPM | OXYGEN SATURATION: 97 % | HEIGHT: 63 IN | TEMPERATURE: 98.6 F | WEIGHT: 175 LBS | RESPIRATION RATE: 24 BRPM | SYSTOLIC BLOOD PRESSURE: 96 MMHG | DIASTOLIC BLOOD PRESSURE: 42 MMHG | BODY MASS INDEX: 31.01 KG/M2

## 2018-08-02 DIAGNOSIS — R71.0 HEMOGLOBIN DROP: ICD-10-CM

## 2018-08-02 DIAGNOSIS — J18.9 PNEUMONIA OF LEFT LOWER LOBE DUE TO INFECTIOUS ORGANISM: ICD-10-CM

## 2018-08-02 DIAGNOSIS — R42 LIGHTHEADED: ICD-10-CM

## 2018-08-02 PROBLEM — I21.11 ST ELEVATION MYOCARDIAL INFARCTION INVOLVING RIGHT CORONARY ARTERY (H): Status: ACTIVE | Noted: 2018-08-02

## 2018-08-02 LAB
ALBUMIN UR-MCNC: 10 MG/DL
ANION GAP SERPL CALCULATED.3IONS-SCNC: 11 MMOL/L (ref 3–14)
APPEARANCE UR: CLEAR
BACTERIA #/AREA URNS HPF: ABNORMAL /HPF
BASOPHILS # BLD AUTO: 0 10E9/L (ref 0–0.2)
BASOPHILS NFR BLD AUTO: 0.3 %
BILIRUB UR QL STRIP: NEGATIVE
BUN SERPL-MCNC: 63 MG/DL (ref 7–30)
CALCIUM SERPL-MCNC: 8.5 MG/DL (ref 8.5–10.1)
CHLORIDE SERPL-SCNC: 109 MMOL/L (ref 94–109)
CO2 SERPL-SCNC: 22 MMOL/L (ref 20–32)
COLOR UR AUTO: ABNORMAL
CREAT SERPL-MCNC: 0.73 MG/DL (ref 0.52–1.04)
DIFFERENTIAL METHOD BLD: ABNORMAL
EOSINOPHIL # BLD AUTO: 0 10E9/L (ref 0–0.7)
EOSINOPHIL NFR BLD AUTO: 0.3 %
ERYTHROCYTE [DISTWIDTH] IN BLOOD BY AUTOMATED COUNT: 12.5 % (ref 10–15)
FERRITIN SERPL-MCNC: 26 NG/ML (ref 8–252)
GFR SERPL CREATININE-BSD FRML MDRD: 76 ML/MIN/1.7M2
GLUCOSE BLDC GLUCOMTR-MCNC: 123 MG/DL (ref 70–99)
GLUCOSE SERPL-MCNC: 211 MG/DL (ref 70–99)
GLUCOSE UR STRIP-MCNC: NEGATIVE MG/DL
HCT VFR BLD AUTO: 28.1 % (ref 35–47)
HGB BLD-MCNC: 9.2 G/DL (ref 11.7–15.7)
HGB UR QL STRIP: NEGATIVE
IMM GRANULOCYTES # BLD: 0.1 10E9/L (ref 0–0.4)
IMM GRANULOCYTES NFR BLD: 1 %
IRON SATN MFR SERPL: 59 % (ref 15–46)
IRON SERPL-MCNC: 144 UG/DL (ref 35–180)
KETONES UR STRIP-MCNC: 5 MG/DL
LACTATE SERPL-SCNC: 2 MMOL/L (ref 0.4–2)
LACTATE SERPL-SCNC: 3.4 MMOL/L (ref 0.4–2)
LACTATE SERPL-SCNC: 3.9 MMOL/L (ref 0.4–2)
LEUKOCYTE ESTERASE UR QL STRIP: NEGATIVE
LYMPHOCYTES # BLD AUTO: 1.7 10E9/L (ref 0.8–5.3)
LYMPHOCYTES NFR BLD AUTO: 22.7 %
MAGNESIUM SERPL-MCNC: 1.7 MG/DL (ref 1.6–2.3)
MCH RBC QN AUTO: 29.1 PG (ref 26.5–33)
MCHC RBC AUTO-ENTMCNC: 32.7 G/DL (ref 31.5–36.5)
MCV RBC AUTO: 89 FL (ref 78–100)
MONOCYTES # BLD AUTO: 0.4 10E9/L (ref 0–1.3)
MONOCYTES NFR BLD AUTO: 5.4 %
MUCOUS THREADS #/AREA URNS LPF: PRESENT /LPF
NEUTROPHILS # BLD AUTO: 5.2 10E9/L (ref 1.6–8.3)
NEUTROPHILS NFR BLD AUTO: 70.3 %
NITRATE UR QL: NEGATIVE
NRBC # BLD AUTO: 0 10*3/UL
NRBC BLD AUTO-RTO: 0 /100
NT-PROBNP SERPL-MCNC: 270 PG/ML (ref 0–1800)
PH UR STRIP: 5 PH (ref 4.7–8)
PLATELET # BLD AUTO: 173 10E9/L (ref 150–450)
POTASSIUM SERPL-SCNC: 4.5 MMOL/L (ref 3.4–5.3)
PROCALCITONIN SERPL-MCNC: <0.05 NG/ML
RBC # BLD AUTO: 3.16 10E12/L (ref 3.8–5.2)
RBC #/AREA URNS AUTO: 0 /HPF (ref 0–2)
RETICS # AUTO: 54 10E9/L (ref 25–95)
RETICS/RBC NFR AUTO: 2.3 % (ref 0.5–2)
SODIUM SERPL-SCNC: 142 MMOL/L (ref 133–144)
SOURCE: ABNORMAL
SP GR UR STRIP: 1.01 (ref 1–1.03)
TIBC SERPL-MCNC: 243 UG/DL (ref 240–430)
TROPONIN I SERPL-MCNC: 0.06 UG/L (ref 0–0.04)
TROPONIN I SERPL-MCNC: 0.08 UG/L (ref 0–0.04)
TROPONIN I SERPL-MCNC: <0.015 UG/L (ref 0–0.04)
TSH SERPL DL<=0.005 MIU/L-ACNC: 0.25 MU/L (ref 0.4–4)
UROBILINOGEN UR STRIP-MCNC: NORMAL MG/DL (ref 0–2)
WBC # BLD AUTO: 7.4 10E9/L (ref 4–11)
WBC #/AREA URNS AUTO: 1 /HPF (ref 0–5)

## 2018-08-02 PROCEDURE — A9270 NON-COVERED ITEM OR SERVICE: HCPCS | Mod: GY | Performed by: PHYSICIAN ASSISTANT

## 2018-08-02 PROCEDURE — 83921 ORGANIC ACID SINGLE QUANT: CPT | Performed by: INTERNAL MEDICINE

## 2018-08-02 PROCEDURE — 93010 ELECTROCARDIOGRAM REPORT: CPT | Performed by: INTERNAL MEDICINE

## 2018-08-02 PROCEDURE — 36415 COLL VENOUS BLD VENIPUNCTURE: CPT | Performed by: INTERNAL MEDICINE

## 2018-08-02 PROCEDURE — 83540 ASSAY OF IRON: CPT | Performed by: INTERNAL MEDICINE

## 2018-08-02 PROCEDURE — 80048 BASIC METABOLIC PNL TOTAL CA: CPT | Performed by: PHYSICIAN ASSISTANT

## 2018-08-02 PROCEDURE — 84484 ASSAY OF TROPONIN QUANT: CPT | Performed by: PHYSICIAN ASSISTANT

## 2018-08-02 PROCEDURE — 93005 ELECTROCARDIOGRAM TRACING: CPT

## 2018-08-02 PROCEDURE — 96361 HYDRATE IV INFUSION ADD-ON: CPT

## 2018-08-02 PROCEDURE — 84484 ASSAY OF TROPONIN QUANT: CPT | Performed by: INTERNAL MEDICINE

## 2018-08-02 PROCEDURE — 25000128 H RX IP 250 OP 636: Performed by: PHYSICIAN ASSISTANT

## 2018-08-02 PROCEDURE — 96375 TX/PRO/DX INJ NEW DRUG ADDON: CPT

## 2018-08-02 PROCEDURE — 94640 AIRWAY INHALATION TREATMENT: CPT | Mod: 76

## 2018-08-02 PROCEDURE — 25000132 ZZH RX MED GY IP 250 OP 250 PS 637: Mod: GY | Performed by: INTERNAL MEDICINE

## 2018-08-02 PROCEDURE — 25000132 ZZH RX MED GY IP 250 OP 250 PS 637: Mod: GY

## 2018-08-02 PROCEDURE — 99285 EMERGENCY DEPT VISIT HI MDM: CPT | Mod: 25

## 2018-08-02 PROCEDURE — 94640 AIRWAY INHALATION TREATMENT: CPT

## 2018-08-02 PROCEDURE — 00000146 ZZHCL STATISTIC GLUCOSE BY METER IP

## 2018-08-02 PROCEDURE — 25000128 H RX IP 250 OP 636

## 2018-08-02 PROCEDURE — 36415 COLL VENOUS BLD VENIPUNCTURE: CPT | Performed by: PHYSICIAN ASSISTANT

## 2018-08-02 PROCEDURE — 82728 ASSAY OF FERRITIN: CPT | Performed by: INTERNAL MEDICINE

## 2018-08-02 PROCEDURE — 83605 ASSAY OF LACTIC ACID: CPT | Performed by: INTERNAL MEDICINE

## 2018-08-02 PROCEDURE — 83605 ASSAY OF LACTIC ACID: CPT | Performed by: PHYSICIAN ASSISTANT

## 2018-08-02 PROCEDURE — 85025 COMPLETE CBC W/AUTO DIFF WBC: CPT | Performed by: PHYSICIAN ASSISTANT

## 2018-08-02 PROCEDURE — 83735 ASSAY OF MAGNESIUM: CPT | Performed by: INTERNAL MEDICINE

## 2018-08-02 PROCEDURE — 70450 CT HEAD/BRAIN W/O DYE: CPT | Mod: TC

## 2018-08-02 PROCEDURE — 25000125 ZZHC RX 250: Performed by: PHYSICIAN ASSISTANT

## 2018-08-02 PROCEDURE — 83880 ASSAY OF NATRIURETIC PEPTIDE: CPT | Performed by: PHYSICIAN ASSISTANT

## 2018-08-02 PROCEDURE — 99284 EMERGENCY DEPT VISIT MOD MDM: CPT | Performed by: PHYSICIAN ASSISTANT

## 2018-08-02 PROCEDURE — A9270 NON-COVERED ITEM OR SERVICE: HCPCS | Mod: GY | Performed by: INTERNAL MEDICINE

## 2018-08-02 PROCEDURE — 85045 AUTOMATED RETICULOCYTE COUNT: CPT | Performed by: INTERNAL MEDICINE

## 2018-08-02 PROCEDURE — 84443 ASSAY THYROID STIM HORMONE: CPT | Performed by: PHYSICIAN ASSISTANT

## 2018-08-02 PROCEDURE — 25000128 H RX IP 250 OP 636: Performed by: INTERNAL MEDICINE

## 2018-08-02 PROCEDURE — 99236 HOSP IP/OBS SAME DATE HI 85: CPT | Performed by: INTERNAL MEDICINE

## 2018-08-02 PROCEDURE — 82607 VITAMIN B-12: CPT | Performed by: INTERNAL MEDICINE

## 2018-08-02 PROCEDURE — A9270 NON-COVERED ITEM OR SERVICE: HCPCS | Mod: GY

## 2018-08-02 PROCEDURE — 25000132 ZZH RX MED GY IP 250 OP 250 PS 637: Mod: GY | Performed by: PHYSICIAN ASSISTANT

## 2018-08-02 PROCEDURE — 82746 ASSAY OF FOLIC ACID SERUM: CPT | Performed by: INTERNAL MEDICINE

## 2018-08-02 PROCEDURE — 84145 PROCALCITONIN (PCT): CPT | Performed by: INTERNAL MEDICINE

## 2018-08-02 PROCEDURE — 83550 IRON BINDING TEST: CPT | Performed by: INTERNAL MEDICINE

## 2018-08-02 PROCEDURE — 71046 X-RAY EXAM CHEST 2 VIEWS: CPT | Mod: TC

## 2018-08-02 PROCEDURE — 12000000 ZZH R&B MED SURG/OB

## 2018-08-02 PROCEDURE — 25000125 ZZHC RX 250: Performed by: INTERNAL MEDICINE

## 2018-08-02 PROCEDURE — 81001 URINALYSIS AUTO W/SCOPE: CPT | Performed by: PHYSICIAN ASSISTANT

## 2018-08-02 PROCEDURE — 96374 THER/PROPH/DIAG INJ IV PUSH: CPT

## 2018-08-02 PROCEDURE — 87040 BLOOD CULTURE FOR BACTERIA: CPT | Performed by: PHYSICIAN ASSISTANT

## 2018-08-02 RX ORDER — IPRATROPIUM BROMIDE AND ALBUTEROL SULFATE 2.5; .5 MG/3ML; MG/3ML
3 SOLUTION RESPIRATORY (INHALATION)
Status: DISCONTINUED | OUTPATIENT
Start: 2018-08-02 | End: 2018-08-03 | Stop reason: HOSPADM

## 2018-08-02 RX ORDER — THYROID 90 MG/1
90 TABLET ORAL DAILY
Status: DISCONTINUED | OUTPATIENT
Start: 2018-08-02 | End: 2018-08-03 | Stop reason: HOSPADM

## 2018-08-02 RX ORDER — FENTANYL CITRATE 50 UG/ML
INJECTION, SOLUTION INTRAMUSCULAR; INTRAVENOUS
Status: COMPLETED
Start: 2018-08-02 | End: 2018-08-02

## 2018-08-02 RX ORDER — ONDANSETRON 4 MG/1
4 TABLET, ORALLY DISINTEGRATING ORAL EVERY 6 HOURS PRN
Status: DISCONTINUED | OUTPATIENT
Start: 2018-08-02 | End: 2018-08-03 | Stop reason: HOSPADM

## 2018-08-02 RX ORDER — ONDANSETRON 2 MG/ML
4 INJECTION INTRAMUSCULAR; INTRAVENOUS EVERY 30 MIN PRN
Status: DISCONTINUED | OUTPATIENT
Start: 2018-08-02 | End: 2018-08-02

## 2018-08-02 RX ORDER — THYROID 60 MG/1
60 TABLET ORAL DAILY
COMMUNITY

## 2018-08-02 RX ORDER — METOPROLOL TARTRATE 50 MG
TABLET ORAL
COMMUNITY

## 2018-08-02 RX ORDER — NALOXONE HYDROCHLORIDE 0.4 MG/ML
.1-.4 INJECTION, SOLUTION INTRAMUSCULAR; INTRAVENOUS; SUBCUTANEOUS
Status: DISCONTINUED | OUTPATIENT
Start: 2018-08-02 | End: 2018-08-03 | Stop reason: HOSPADM

## 2018-08-02 RX ORDER — SODIUM CHLORIDE 9 MG/ML
INJECTION, SOLUTION INTRAVENOUS CONTINUOUS
Status: DISCONTINUED | OUTPATIENT
Start: 2018-08-02 | End: 2018-08-03 | Stop reason: HOSPADM

## 2018-08-02 RX ORDER — NITROGLYCERIN 0.4 MG/1
0.4 TABLET SUBLINGUAL EVERY 5 MIN PRN
Status: DISCONTINUED | OUTPATIENT
Start: 2018-08-02 | End: 2018-08-03 | Stop reason: HOSPADM

## 2018-08-02 RX ORDER — ASPIRIN 81 MG/1
81 TABLET ORAL DAILY
Status: DISCONTINUED | OUTPATIENT
Start: 2018-08-02 | End: 2018-08-03 | Stop reason: HOSPADM

## 2018-08-02 RX ORDER — CEFTRIAXONE SODIUM 1 G/50ML
1 INJECTION, SOLUTION INTRAVENOUS ONCE
Status: DISCONTINUED | OUTPATIENT
Start: 2018-08-02 | End: 2018-08-02

## 2018-08-02 RX ORDER — ONDANSETRON 2 MG/ML
4 INJECTION INTRAMUSCULAR; INTRAVENOUS EVERY 6 HOURS PRN
Status: DISCONTINUED | OUTPATIENT
Start: 2018-08-02 | End: 2018-08-03 | Stop reason: HOSPADM

## 2018-08-02 RX ORDER — NITROGLYCERIN 0.4 MG/1
TABLET SUBLINGUAL
Status: COMPLETED
Start: 2018-08-02 | End: 2018-08-02

## 2018-08-02 RX ORDER — LOSARTAN POTASSIUM 100 MG/1
100 TABLET ORAL
COMMUNITY
End: 2020-03-15

## 2018-08-02 RX ORDER — SODIUM CHLORIDE 9 MG/ML
INJECTION, SOLUTION INTRAVENOUS CONTINUOUS
Status: DISCONTINUED | OUTPATIENT
Start: 2018-08-02 | End: 2018-08-02

## 2018-08-02 RX ORDER — ASPIRIN 81 MG/1
324 TABLET, CHEWABLE ORAL ONCE
Status: COMPLETED | OUTPATIENT
Start: 2018-08-02 | End: 2018-08-02

## 2018-08-02 RX ORDER — POLYETHYLENE GLYCOL 3350 17 G/17G
17 POWDER, FOR SOLUTION ORAL DAILY
Status: DISCONTINUED | OUTPATIENT
Start: 2018-08-02 | End: 2018-08-03 | Stop reason: HOSPADM

## 2018-08-02 RX ORDER — ACETAMINOPHEN 325 MG/1
650 TABLET ORAL EVERY 4 HOURS PRN
Status: DISCONTINUED | OUTPATIENT
Start: 2018-08-02 | End: 2018-08-03 | Stop reason: HOSPADM

## 2018-08-02 RX ORDER — CEFTRIAXONE SODIUM 1 G/50ML
1 INJECTION, SOLUTION INTRAVENOUS EVERY 24 HOURS
Status: DISCONTINUED | OUTPATIENT
Start: 2018-08-03 | End: 2018-08-03 | Stop reason: HOSPADM

## 2018-08-02 RX ORDER — THYROID 90 MG/1
75 TABLET ORAL DAILY
Status: DISCONTINUED | OUTPATIENT
Start: 2018-08-02 | End: 2018-08-02 | Stop reason: DRUGHIGH

## 2018-08-02 RX ORDER — FOLIC ACID 1 MG/1
1 TABLET ORAL 3 TIMES DAILY
Status: DISCONTINUED | OUTPATIENT
Start: 2018-08-02 | End: 2018-08-03 | Stop reason: HOSPADM

## 2018-08-02 RX ORDER — CLOPIDOGREL BISULFATE 75 MG/1
75 TABLET ORAL DAILY
Status: DISCONTINUED | OUTPATIENT
Start: 2018-08-02 | End: 2018-08-03 | Stop reason: HOSPADM

## 2018-08-02 RX ORDER — IPRATROPIUM BROMIDE AND ALBUTEROL SULFATE 2.5; .5 MG/3ML; MG/3ML
3 SOLUTION RESPIRATORY (INHALATION) ONCE
Status: COMPLETED | OUTPATIENT
Start: 2018-08-02 | End: 2018-08-02

## 2018-08-02 RX ORDER — HEPARIN SODIUM 1000 [USP'U]/ML
4000 INJECTION, SOLUTION INTRAVENOUS; SUBCUTANEOUS ONCE
Status: CANCELLED | OUTPATIENT
Start: 2018-08-02

## 2018-08-02 RX ADMIN — NITROGLYCERIN 0.4 MG: 0.4 TABLET SUBLINGUAL at 21:02

## 2018-08-02 RX ADMIN — SODIUM CHLORIDE 1000 ML: 9 INJECTION, SOLUTION INTRAVENOUS at 14:37

## 2018-08-02 RX ADMIN — FOLIC ACID 1 MG: 1 TABLET ORAL at 20:23

## 2018-08-02 RX ADMIN — ONDANSETRON 4 MG: 2 INJECTION, SOLUTION INTRAMUSCULAR; INTRAVENOUS at 14:39

## 2018-08-02 RX ADMIN — ASPIRIN 81 MG 324 MG: 81 TABLET ORAL at 12:54

## 2018-08-02 RX ADMIN — NITROGLYCERIN 0.4 MG: 0.4 TABLET SUBLINGUAL at 21:09

## 2018-08-02 RX ADMIN — SODIUM CHLORIDE: 9 INJECTION, SOLUTION INTRAVENOUS at 19:31

## 2018-08-02 RX ADMIN — FENTANYL CITRATE 100 MCG: 50 INJECTION INTRAMUSCULAR; INTRAVENOUS at 21:45

## 2018-08-02 RX ADMIN — NITROGLYCERIN: 0.4 TABLET SUBLINGUAL at 20:53

## 2018-08-02 RX ADMIN — ENOXAPARIN SODIUM 40 MG: 40 INJECTION SUBCUTANEOUS at 20:23

## 2018-08-02 RX ADMIN — ONDANSETRON 4 MG: 2 INJECTION, SOLUTION INTRAMUSCULAR; INTRAVENOUS at 12:53

## 2018-08-02 RX ADMIN — CEFTRIAXONE SODIUM 1 G: 1 INJECTION, SOLUTION INTRAVENOUS at 18:48

## 2018-08-02 RX ADMIN — CLOPIDOGREL 75 MG: 75 TABLET, FILM COATED ORAL at 21:26

## 2018-08-02 RX ADMIN — AZITHROMYCIN MONOHYDRATE 500 MG: 500 INJECTION, POWDER, LYOPHILIZED, FOR SOLUTION INTRAVENOUS at 17:31

## 2018-08-02 RX ADMIN — IPRATROPIUM BROMIDE AND ALBUTEROL SULFATE 3 ML: .5; 3 SOLUTION RESPIRATORY (INHALATION) at 19:51

## 2018-08-02 RX ADMIN — CLOPIDOGREL 75 MG: 75 TABLET, FILM COATED ORAL at 20:23

## 2018-08-02 RX ADMIN — IPRATROPIUM BROMIDE AND ALBUTEROL SULFATE 3 ML: .5; 3 SOLUTION RESPIRATORY (INHALATION) at 17:45

## 2018-08-02 RX ADMIN — SODIUM CHLORIDE 1000 ML: 9 INJECTION, SOLUTION INTRAVENOUS at 16:22

## 2018-08-02 ASSESSMENT — ENCOUNTER SYMPTOMS
FEVER: 0
COUGH: 0
NAUSEA: 1
LIGHT-HEADEDNESS: 1
SINUS PRESSURE: 0
CHEST TIGHTNESS: 1
CHILLS: 0
DIZZINESS: 1
CARDIOVASCULAR NEGATIVE: 1
WEAKNESS: 1
SORE THROAT: 0
PSYCHIATRIC NEGATIVE: 1
EYES NEGATIVE: 1

## 2018-08-02 ASSESSMENT — PAIN DESCRIPTION - DESCRIPTORS: DESCRIPTORS: CONSTANT;TIGHTNESS

## 2018-08-02 ASSESSMENT — ACTIVITIES OF DAILY LIVING (ADL): ADLS_ACUITY_SCORE: 9

## 2018-08-02 NOTE — ED NOTES
Pt states she is on one more BP med but unable to remember at this time. States gets her meds filled at [facility] clinic.

## 2018-08-02 NOTE — ED NOTES
"Pt states while standing for xray she felt \"very wobbly and my chest tightness became worse up to 6/10\".  "

## 2018-08-02 NOTE — IP AVS SNAPSHOT
` ` Patient Information     Patient Name Sex     Kary Moody ([id number]) Female 1933       Room Bed    3224 3224-1      Patient Demographics     Address Phone    [address] [place] [address] [phone] (Home)  none (Mobile)      Patient Ethnicity & Race     Ethnic Group Patient Race    American White      Emergency Contact(s)     Name Relation Home Work Mobile    Jake Moody Spouse [phone]  none    Ashley Morin Daughter [phone]  none      Documents on File        Status Date Received Description       Documents for the Patient    HIM HARJEET Authorization  13 STAT FAX REQUEST- DR RIDER    Consent for Services - Hospital/Clinic-ESign Received () 13     [facility] Privacy Notice-ESign Received () 13     Insurance Card Received 13 Medicare    Patient ID Received () 13     Consent for EHR Access-Received-ESign Received 13     Consent for EHR Access-Decline-ESign       Physical Therapy Certification Received 12/10/13 10-9-13    Consent for Services - Hospital/Clinic-ESign Received () 10/22/14     Consent for Services/Privacy Notice - Hospital/Clinic-Esign Received () 16     Privacy Notice - [facility] Received 16     Advance Directives and Living Will Not Received  INVALID DIRECTIVE DATED 2004 RECEIVED    Advance Directives and Living Will Received 16 HEALTH CARE DIRECTIVE 2004    Advance Directives and Living Will Not Received  VALIDATION OF AD 2004    Consent for Services/Privacy Notice - Hospital/Clinic Received () 17     Insurance Card Received 17 BCBS    Patient ID Received 17 [place]    [facility]IM HARJEET Authorization  17     Consent for Services - Hospital and Clinic Received 18     [facility] Auth Received 18     Insurance Card       Advance Directives and Living Will Received (Deleted) 16       Admission Information     Attending  Provider Admitting Provider Admission Type Admission Date/Time    Felicita Lopez MD Urbonas, Arvydas, MD Emergency 08/02/18  1239    Discharge Date Hospital Service Auth/Cert Status Service Area     General Medicine Incomplete [facility]    Unit Room/Bed Admission Status       [facility] 3224/3224-1 Admission (Confirmed)       Admission     Complaint    Pneumonia      Hospital Account     Name Acct ID Class Status Primary Coverage    Kary Moody [id number] Inpatient Open MEDICARE - MEDICARE            Guarantor Account (for Hospital Account #[id number])     Name Relation to Pt Service Area Active? Acct Type    Kary Moody Self [facility]ANGE Yes Personal/Family    Address Phone          [address] [place] [address] [phone](H)              Coverage Information (for Hospital Account #[id number])     1. MEDICARE/MEDICARE     F/O Payor/Plan Precert #    MEDICARE/MEDICARE     Subscriber Subscriber #    Kary Moody [id number]    Address Phone    [employer] CLAIM[employer]  [address]  [place] [address] [phone]          2. BC[place]     F/O Payor/Plan Precert #    [place]     Subscriber Subscriber #    Kary Moody [id number]    Address Phone    [address] [address]  [place] [address] [phone]

## 2018-08-02 NOTE — ED NOTES
DATE:  8/2/2018   TIME OF RECEIPT FROM LAB:  5632  LAB TEST:  Lactic acid  LAB VALUE:  3.9  RESULTS GIVEN WITH READ-BACK TO (PROVIDER):  HORACE Byrd  TIME LAB VALUE REPORTED TO PROVIDER:   3755

## 2018-08-02 NOTE — ED TRIAGE NOTES
"Stated during the night she felt very bloated \"and not quite right\". Stated had a BM this am with some relief of bloatedness but she still felt \"off\". Stated history of stroke in past. Brought in by ambulance.   "

## 2018-08-02 NOTE — ED NOTES
"Pt stated 3 weeks she had a \"bad cold\".  Stated at 0400 today she woke up and wasn't  Feeling right. Went to be feeling normal at bedtime around 0030 today. Pt stated chest tightness with dizziness at 1130 today so she had  call the ambulance. States has not taken meds today and just ate some saltine crackers.  "

## 2018-08-02 NOTE — ED PROVIDER NOTES
History     Chief Complaint   Patient presents with     Chest Pain     Pt stated she had tightness in the chest with feeling weak and needed assistance to get up from her .     HPI  Kary Moody is a 84 year old female with Hx CAD, CVA, hypothyroidism who presents via [facility] d/t nausea, dizziness and chest tightness. Kary is concerned about a stroke as she had similar symptoms in the past. She describes dizziness as occurring after onset of nausea at about 11AM. This was followed by weakness, feeling of lightheadedness and chest tightness. She denies chest pain, but reports ongoing nausea despite eating 3 soda crackers. Kary adds that she had a cough for 3 weeks that has slowly improved.     Problem List:    Patient Active Problem List    Diagnosis Date Noted     ACP (advance care planning) 05/31/2016     Priority: Medium     Advance Care Planning 5/31/2016: Receipt of ACP document:  Received: Health Care Directive which was witnessed or notarized on 8/31/2004.  Document not previously scanned.  Validation form completed and sent with document to be scanned.   A conversation about goals of care is recommended for this patient, with creation of a POLST if appropriate.  Confirmed/documented designated decision maker(s).  Added by Khadra Woodall             Chest pain 05/28/2016     Priority: Medium        Past Medical History:    No past medical history on file.    Past Surgical History:    No past surgical history on file.    Family History:    No family history on file.    Social History:  Marital Status:   [2]  Social History   Substance Use Topics     Smoking status: Never Smoker     Smokeless tobacco: Never Used     Alcohol use No        Medications:      aspirin 81 MG tablet   Clopidogrel Bisulfate (PLAVIX PO)   FOLIC ACID PO   losartan (COZAAR) 100 MG tablet   METOPROLOL TARTRATE PO   polyethylene glycol (MIRALAX/GLYCOLAX) Packet   Thyroid (ARMOUR THYROID) 90 MG TABS  "  magnesium hydroxide (MILK OF MAGNESIA) 400 MG/5ML suspension   Nitroglycerin (NITROTAB SL)         Review of Systems   Constitutional: Negative for chills and fever.   HENT: Negative for congestion, ear pain, sinus pressure and sore throat.    Eyes: Negative.    Respiratory: Positive for chest tightness. Negative for cough.    Cardiovascular: Negative.    Gastrointestinal: Positive for nausea.   Skin: Negative.    Neurological: Positive for dizziness, weakness and light-headedness.   Psychiatric/Behavioral: Negative.        Physical Exam   BP: 124/81  Heart Rate: 95  Temp: 97.7  F (36.5  C)  Resp: 18  Height: 160 cm (5' 3\")  Weight: 79.4 kg (175 lb)  SpO2: 98 %      Physical Exam   Constitutional: She is oriented to person, place, and time. She appears well-developed and well-nourished. No distress (pleasant elderly female, laying in cot).   HENT:   Head: Normocephalic and atraumatic.   Right Ear: External ear normal.   Left Ear: External ear normal.   Mouth/Throat: Oropharynx is clear and moist.   Eyes: Conjunctivae and EOM are normal. Pupils are equal, round, and reactive to light.   Cardiovascular: Normal rate and normal heart sounds.    Pulmonary/Chest: Effort normal and breath sounds normal.   Dim bases   Abdominal: Soft. Bowel sounds are normal. There is no tenderness.   Neurological: She is alert and oriented to person, place, and time. No cranial nerve deficit.    strength 5/5 bilaterally. Flexes/extends bilateral ankles against resistance. Heel to shin intact.    Skin: Skin is warm and dry.   Psychiatric: She has a normal mood and affect.   Nursing note and vitals reviewed.      ED Course     ED Course     Procedures       EKG Interpretation:      Interpreted by Carson Weiner  Time reviewed: 1255  Symptoms at time of EKG: nausea, chest tightness   Rhythm: normal sinus   Rate: 99  Axis: Normal  Ectopy: none  Conduction: normal  ST Segments/ T Waves: T wave inversion V1  Q Waves: none  Comparison to " prior: Grossly unchanged from 2016    Clinical Impression: non-specific EKG        Results for orders placed or performed during the hospital encounter of 08/02/18 (from the past 24 hour(s))   CBC with platelets differential   Result Value Ref Range    WBC 7.4 4.0 - 11.0 10e9/L    RBC Count 3.16 (L) 3.8 - 5.2 10e12/L    Hemoglobin 9.2 (L) 11.7 - 15.7 g/dL    Hematocrit 28.1 (L) 35.0 - 47.0 %    MCV 89 78 - 100 fl    MCH 29.1 26.5 - 33.0 pg    MCHC 32.7 31.5 - 36.5 g/dL    RDW 12.5 10.0 - 15.0 %    Platelet Count 173 150 - 450 10e9/L    Diff Method Automated Method     % Neutrophils 70.3 %    % Lymphocytes 22.7 %    % Monocytes 5.4 %    % Eosinophils 0.3 %    % Basophils 0.3 %    % Immature Granulocytes 1.0 %    Nucleated RBCs 0 0 /100    Absolute Neutrophil 5.2 1.6 - 8.3 10e9/L    Absolute Lymphocytes 1.7 0.8 - 5.3 10e9/L    Absolute Monocytes 0.4 0.0 - 1.3 10e9/L    Absolute Eosinophils 0.0 0.0 - 0.7 10e9/L    Absolute Basophils 0.0 0.0 - 0.2 10e9/L    Abs Immature Granulocytes 0.1 0 - 0.4 10e9/L    Absolute Nucleated RBC 0.0    Basic metabolic panel   Result Value Ref Range    Sodium 142 133 - 144 mmol/L    Potassium 4.5 3.4 - 5.3 mmol/L    Chloride 109 94 - 109 mmol/L    Carbon Dioxide 22 20 - 32 mmol/L    Anion Gap 11 3 - 14 mmol/L    Glucose 211 (H) 70 - 99 mg/dL    Urea Nitrogen 63 (H) 7 - 30 mg/dL    Creatinine 0.73 0.52 - 1.04 mg/dL    GFR Estimate 76 >60 mL/min/1.7m2    GFR Estimate If Black >90 >60 mL/min/1.7m2    Calcium 8.5 8.5 - 10.1 mg/dL   Troponin I   Result Value Ref Range    Troponin I ES <0.015 0.000 - 0.045 ug/L   TSH   Result Value Ref Range    TSH 0.25 (L) 0.40 - 4.00 mU/L   Nt probnp inpatient   Result Value Ref Range    N-Terminal Pro BNP Inpatient 270 0 - 1800 pg/mL   Lactic acid   Result Value Ref Range    Lactic Acid 3.9 (H) 0.4 - 2.0 mmol/L   CT Head w/o Contrast    Narrative    PROCEDURE: CT HEAD W/O CONTRAST     HISTORY: dizziness; .    COMPARISON: None.    TECHNIQUE:  Helical images of  the head from the foramen magnum to the  vertex were obtained without contrast.    FINDINGS: There is an old basal ganglia lacunar infarct seen on the  right this is a new finding as compared to previous examination in  April 2017. The ventricular system and cortical sulci are normal for  age. There are no masses ventricular shifts or extracerebral  collections brainstem and cerebellum appear normal. The cranial vault  is intact. Moderate mucosal thickening is seen in the right sphenoid  sinus.      Impression    IMPRESSION:   1. There is an old lacunar infarcts seen in the basal ganglion on the  right. This represents a change as compared to April 2017 but is not  an acute event.  2. Mucosal thickening in the right sphenoid sinus suspicious for  sphenoid sinusitis.      TOMER CM MD   XR Chest 2 Views    Narrative    PROCEDURE:  XR CHEST 2 VW    HISTORY:  Chest pain; .     COMPARISON:  2016    FINDINGS:   The cardiac silhouette is normal in size. The pulmonary vasculature is  normal.  Is abnormal increase in density in the left lung consistent  with atelectasis or pneumonia. No pleural effusion or pneumothorax.      Impression    IMPRESSION:  Left basilar opacity consistent with atelectasis or  pneumonia      TOMER CM MD   UA reflex to Microscopic and Culture   Result Value Ref Range    Color Urine Light Yellow     Appearance Urine Clear     Glucose Urine Negative NEG^Negative mg/dL    Bilirubin Urine Negative NEG^Negative    Ketones Urine 5 (A) NEG^Negative mg/dL    Specific Gravity Urine 1.015 1.003 - 1.035    Blood Urine Negative NEG^Negative    pH Urine 5.0 4.7 - 8.0 pH    Protein Albumin Urine 10 (A) NEG^Negative mg/dL    Urobilinogen mg/dL Normal 0.0 - 2.0 mg/dL    Nitrite Urine Negative NEG^Negative    Leukocyte Esterase Urine Negative NEG^Negative    Source Midstream Urine     RBC Urine 0 0 - 2 /HPF    WBC Urine 1 0 - 5 /HPF    Bacteria Urine None (A) NEG^Negative /HPF    Mucous Urine Present  (A) NEG^Negative /LPF       Medications   ondansetron (ZOFRAN) injection 4 mg (4 mg Intravenous Given 8/2/18 1439)   0.9% sodium chloride BOLUS (1,000 mLs Intravenous New Bag 8/2/18 1622)   azithromycin (ZITHROMAX) 500 mg in sodium chloride 0.9 % 250 mL intermittent infusion (not administered)   cefTRIAXone in d5w (ROCEPHIN) intermittent infusion 1 g (not administered)   aspirin chewable tablet 324 mg (324 mg Oral Given 8/2/18 1254)   0.9% sodium chloride BOLUS (0 mLs Intravenous Stopped 8/2/18 1549)       Assessments & Plan (with Medical Decision Making)     I have reviewed the nursing notes.  I have reviewed the findings, diagnosis, plan and need for follow up with the patient.    New Prescriptions    No medications on file       Final diagnoses:   Pneumonia of left lower lobe due to infectious organism (H)   Lightheaded   Hemoglobin drop   CT negative for acute bleed. EKG grossly unchanged from 2016,  Kary had HGB of 12.2 at visit with PCP on 6/14/18 that was 12.2, down to 9.2 today although reports anemia in her past. Lactic upon presentation 3.9, CXR reveals left basilar opacity. Upon further discussion she states her  was treated for pneumonia about 4 weeks ago. She is on her 2nd bag IVF and still feels profound dizziness/lightheaded to get to commode. I spoke with our hospitalist Dr Pretty who graciously accepts care. She is receiving 2nd liter IVF, repeat lactic pending, orders placed for azithromycin and rocephin.     8/2/2018   [facility]     Carson Weiner PA  08/02/18 0332

## 2018-08-03 ENCOUNTER — TRANSFERRED RECORDS (OUTPATIENT)
Dept: HEALTH INFORMATION MANAGEMENT | Facility: CLINIC | Age: 83
End: 2018-08-03

## 2018-08-03 LAB
FOLATE SERPL-MCNC: 20.2 NG/ML
VIT B12 SERPL-MCNC: 315 PG/ML (ref 193–986)

## 2018-08-03 NOTE — ED NOTES
Images pushed to [facility] and all paper work and loose paperwork was sent with pt to [facility]

## 2018-08-03 NOTE — DISCHARGE SUMMARY
Physician Discharge Summary     Patient ID:  Kary Moody  [id number]  84 year old  11/8/1933    Admit date: 8/2/2018    Discharge date and time: No discharge date for patient encounter.     Admitting Physician: Felicita Lopez MD     Discharge Physician: Brennan    Admission Diagnoses: Lightheaded [R42]  Near syncope  Anemia  Hemoglobin drop [R71.0]  Pneumonia of left lower lobe due to infectious organism (H) [J18.1]    Discharge Diagnoses: Inferior wall STEMI  Possible LLL pneumonia    Admission Condition: stable    Discharged Condition: fair    Indication for Admission: pneumonia, near syncope, chest pain    Hospital Course: For details of history of present illness please see HPI of my admission note.  Since admission patient was diagnosed with STEMI inferior wall, the details see below:  We notice that second troponin was elevated but at that time patient had no chest pain.  Third troponin was ordered to be checked 10 PM.  Sometime after 9 PM patient started having the chest pain across her chest radiating to the left shoulder.  My knowing her abnormal stress test in the past history of on and off chest pain he ordered troponin and EKG.  EKG showed ST elevation in lead III and aVF and reciprocal changes in lead I aVL and anterior leads precordial.  Stat troponin ordered.  Nitroglycerin given.  Decrease pain but also blood pressure dropped to 68 systolic.  Blood pressure improved nicely with 500 cc normal saline.  Patient admits is very minimal and distant.  Initially she became diaphoretic and pale with pain improvement her color came back and diaphoresis disappeared.  We gave her 50 fentanyl which did not affect her blood pressure but improved pain.  I have discussed the case with [facility] intake physician and cardiologist, EKGs have been transferred and patient was accepted for transfer to go directly to the Cath Lab.  Family notified.  Patient agreed to go to [facility].  She was given  additional 75 of Plavix(she takes 75 Plavix every morning and she took that dose before she came to emergency room), she already was given 3-4 mg aspirin while in the emergency room today.  Before transfer she received 4000 units of heparin IV bolus.  Helicopter arrived and patient was transferred by air to [facility] immediately prior to transfer she was hemodynamically stable and pain was minimal.    Consults: cardiology    Significant Diagnostic Studies: Rising troponin I, abnormal EKG and XR chest with differential of possible atelectasis or infiltrate. Initial elevation of lactic acid    Treatments: IV hydration, cardiac meds: metoprolol, anticoagulation: ASA (324 mg on presentation to ED), Plavix 75 pt took in AM and 75 mg given before transfer and heparin 4000 U IV bolus and nitroglycerin SL and fentanyl 50 mg IV     Discharge Exam  General: Patient is awake alert in no distress  Neck no jugular venous distention  Cardiac: Irregular due to frequent extrasystole no S3 no S4  Pulmonary: Same-crackles left lower lobe posterior.  GI: Soft nontender nondistended no pulsatile masses.  Skin: Was clammy and pale during the episode of chest pain and hypotension resolved now.  Neurologic exam nonfocal  Psychiatric exam: Awake alert oriented ×4    Disposition: By ear to [facility] scalp flap      Activity: bedrest  Diet: N.p.o.  Wound Care: none needed    Follow-up with with primary in To be determined     Signed:  YARED Moody MD  8/2/2018  10:00 PM

## 2018-08-03 NOTE — PLAN OF CARE
"2052 reports chest pain. Dr Winters at bedside. Med with SL NTG   2057 pain to 4/10-was 7/10.     2102 2nd ntg\" given  EKG done Reports [ain increasing-now to 7/10     Lab here to draw lactic and card enz   2107 Dr Winters here reports pt infarcting. Charge nurse to call family.     2109 3rd ntg given     2115 Stemi called  Pain is 5-6/10     Setting  Up for heparin drip  (2124)    2125 75 mg plavux given  4000units IV heparin given    2137 50mcg IV fentanyl given    2146 Transport here.  at bedside. Pt alert.     2148 Zofran 4mg IV given By Estella ALVARADO  "

## 2018-08-03 NOTE — H&P
"[facility]    History and Physical  Hospitalist       Date of Admission:  8/2/2018    Assessment & Plan   Kary Moody is a 84 year old female who presents with lightheadedness, weakness, chest tightness, increasing cough x 3 weeks    Active Problems:    Pneumonia    Assessment:  LLL infiltrate also could be atelectasis. Nl WBC, procalcitonin, but pt admits chills, increasing cough. On exam - crackles left posterior base. Lactate was elevated on presentation.     Plan: Treat as CAP with Rocephin and Azithromycin    Lightheadedness/near syncope  Assessment: differential: bleeding, med side effects (on metoprolol qid), ischemia (chest pain and rising troponin on admission)  Plan: hydrate, Orthostatic VS, serial troponins, adjust meds, tele    Anemia  Assessment: her Hg was 12.2 June 2018. Today 9.2  Plan: serial H/H.     Chest tightness  Assessment: I have reviewed old records. Stress test was abnormal 2016.   Plan: will do serial troponin, ekg and stress test will be ordered on discharge to be done ASAP outpatient.   She received 324 mg asa and took 75 mg of her \"usual\" dose of Plavix this AM    Elevated troponin  Assessment; most likely \"leak\". 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 yes   Pain score (0-10) 5   Pain location Chest   Pain descriptors Tightness   Kary herrera pain level was assessed and she currently denies pain.        DVT Prophylaxis: Enoxaparin (Lovenox) SQ  Code Status: DNR / DNI    Disposition: Expected discharge in 3-4 days once respiratory status stable, anemia w/u, tolerated oral meds.    YARED Moody MD    Primary Care Physician   Khadra Tariq    Chief Complaint   Several: weakness, lightheadedness, near syncope, chest " tightness, productive cough x 3 weeks.     History is obtained from the patient, electronic health record, emergency department physician and patient's spouse    History of Present Illness   Kary Moody is a 84 year old women with PMH of essential hypertension, hypothyroidism, several TIAs, cervical spondylosis with radiculopathy, GERD without esophagitis, choledocholithiasis, abnormal nuclear cardiac imaging test, statin intolerance, acute confusional state, who was brought to emergency room via [place] ambulance due to nausea dizziness and chest tightness.  Patient stated that she was having cough for almost 3 weeks.  She was afraid to take antibiotics because of the many medications she was already taking.  She admits having chills but never checked the temperature.  She also admits some chest tightness.    One day prior to admission her shortness of breath increased and she had some nausea.  Slept on the couch the night before admission.  At 1130 when she decided to get up from couch she noticed that she is lightheaded and her vision was narrowing.  She was complaining of some chest tightness and weakness.   was ready to take her to emergency room with private car, but she requested ambulance.  When EMS arrived, her systolic blood pressure was 118 which is lower than she used to have.  And her heart rate was increased according to paramedics.    She was taken to emergency room.  Emergency room workup showed anemia hemoglobin 9.8(bilirubin was 12.2 June this year), white blood cell count was normal lactate elevated but pro calcitonin was normal.  First troponin check was normal second elevated.  EKG is abnormal: showed only minimal ST changes, q in III, elevated heart rate 100 -1 10 bpm, frequent PACs.  Chest x-ray 2 views done in the emergency room showed left lower lobe atelectasis versus infiltrate.  She admits to chills, occasional palpitation, chest tightness, but denies right upper quadrant  pain and nausea vomiting diarrhea dysuria.  The rest is also negative with 14 points of review of system obtained.  Treatment in the emergency room: He was given Rocephin and azithromycin and IV fluids.      Past Medical History    I have reviewed this patient's medical history and updated it with pertinent information if needed.   No past medical history on file.    Past Surgical History   I have reviewed this patient's surgical history and updated it with pertinent information if needed.  No past surgical history on file.    Prior to Admission Medications   Prior to Admission Medications   Prescriptions Last Dose Informant Patient Reported? Taking?   Clopidogrel Bisulfate (PLAVIX PO) 8/1/2018 at 0700  Yes Yes   Sig: Take 75 mg by mouth daily    FOLIC ACID PO 8/1/2018 at 2000  Yes Yes   Sig: Take 1 mg by mouth 3 times daily   METOPROLOL TARTRATE PO 8/1/2018 at 2000  Yes Yes   Sig: Take 100 mg by mouth 2 times daily   Nitroglycerin (NITROTAB SL) More than a month at Unknown time  Yes No   Sig: Place 0.4 mg under the tongue every 5 minutes as needed for chest pain   THYROID PO 8/1/2018 at Unknown time  Yes Yes   Sig: Take 90 mg by mouth   aspirin 81 MG tablet 8/1/2018 at Unknown time  Yes Yes   Sig: Take 81 mg by mouth daily   losartan (COZAAR) 100 MG tablet 8/1/2018 at 0800  Yes Yes   Sig: Take 100 mg by mouth   magnesium hydroxide (MILK OF MAGNESIA) 400 MG/5ML suspension More than a month at Unknown time  No No   Sig: Take 30 mLs by mouth daily as needed for constipation   polyethylene glycol (MIRALAX/GLYCOLAX) Packet Past Week at Unknown time  Yes Yes   Sig: Take 1 packet by mouth daily      Facility-Administered Medications: None     Allergies   Allergies   Allergen Reactions     Atorvastatin      Celebrex [Celecoxib] Nausea and Vomiting     Codeine Phosphate      Flu Virus Vaccine      Ibuprofen Sodium      Latex Hives     Lisinopril Cough     Penicillins      Welchol [Colesevelam]      Niaspan [Niacin] Rash      Upset Stomach       Social History   I have reviewed this patient's social history and updated it with pertinent information if needed. Kary Moody  reports that she has never smoked. She has never used smokeless tobacco. She reports that she does not drink alcohol or use illicit drugs.    Family History   I have reviewed this patient's family history and updated it with pertinent information if needed.   No family history on file.    Review of Systems   As per history of present illness please see above.    Physical Exam   Temp: 98.6  F (37  C) Temp src: Tympanic BP: 99/54 Pulse: 109 Heart Rate: 97 Resp: (!) 28 SpO2: 95 % O2 Device: None (Room air)    Vital Signs with Ranges  Temp:  [97.7  F (36.5  C)-98.6  F (37  C)] 98.6  F (37  C)  Pulse:  [109] 109  Heart Rate:  [] 97  Resp:  [18-28] 28  BP: ()/(46-81) 99/54  SpO2:  [92 %-99 %] 95 %  175 lbs 0 oz    Physical exam:  General: Elderly woman not in acute distress.  Awake alert oriented ×4.  HEENT: No icterus, mucous membranes dry.  Neck: Full range of motion jugular venous pressure not elevated.  Cardiac: Irregular due to frequent extrasystole no S3 no S4 PMI not palpable, 1/6 systolic murmur.  Lungs: Crackles left posterior base.  No wheezes.  Abdomen: Nondistended soft nontender bowel sounds present no pulsatile masses no hepatosplenomegaly.  : No CVA tenderness, bladder not palpable.  Skin: Warm dry well perfused no rash.  Neurologic exam: Normal sensorimotor and reflex exam.  Psychiatric exam: Awake alert oriented ×4 normal affect.  Lymphatic/hematologic: No lymphadenopathy no petechia no ecchymosis.    Data   Data reviewed today:  I personally reviewed the EKG tracing showing Sinus tachycardia with frequent PACs and the chest x-ray image(s) showing Left lower lobe atelectasis versus developing infiltrate.  Left-sided heart border hazy and not sharp..    Recent Labs  Lab 08/02/18  1821 08/02/18  1251   WBC  --  7.4   HGB  --  9.2*   MCV  --   89   PLT  --  173   NA  --  142   POTASSIUM  --  4.5   CHLORIDE  --  109   CO2  --  22   BUN  --  63*   CR  --  0.73   ANIONGAP  --  11   CANDACE  --  8.5   GLC  --  211*   TROPI 0.062* <0.015       Recent Results (from the past 24 hour(s))   CT Head w/o Contrast    Narrative    PROCEDURE: CT HEAD W/O CONTRAST     HISTORY: dizziness; .    COMPARISON: None.    TECHNIQUE:  Helical images of the head from the foramen magnum to the  vertex were obtained without contrast.    FINDINGS: There is an old basal ganglia lacunar infarct seen on the  right this is a new finding as compared to previous examination in  April 2017. The ventricular system and cortical sulci are normal for  age. There are no masses ventricular shifts or extracerebral  collections brainstem and cerebellum appear normal. The cranial vault  is intact. Moderate mucosal thickening is seen in the right sphenoid  sinus.      Impression    IMPRESSION:   1. There is an old lacunar infarcts seen in the basal ganglion on the  right. This represents a change as compared to April 2017 but is not  an acute event.  2. Mucosal thickening in the right sphenoid sinus suspicious for  sphenoid sinusitis.      TOMER CM MD   XR Chest 2 Views    Narrative    PROCEDURE:  XR CHEST 2 VW    HISTORY:  Chest pain; .     COMPARISON:  2016    FINDINGS:   The cardiac silhouette is normal in size. The pulmonary vasculature is  normal.  Is abnormal increase in density in the left lung consistent  with atelectasis or pneumonia. No pleural effusion or pneumothorax.      Impression    IMPRESSION:  Left basilar opacity consistent with atelectasis or  pneumonia      TOMER CM MD

## 2018-08-06 LAB — METHYLMALONATE SERPL-SCNC: 0.36 UMOL/L (ref 0–0.4)

## 2018-08-08 LAB
BACTERIA SPEC CULT: NORMAL
BACTERIA SPEC CULT: NORMAL
Lab: NORMAL
Lab: NORMAL
SPECIMEN SOURCE: NORMAL
SPECIMEN SOURCE: NORMAL

## 2019-02-05 ENCOUNTER — TRANSFERRED RECORDS (OUTPATIENT)
Dept: HEALTH INFORMATION MANAGEMENT | Facility: CLINIC | Age: 84
End: 2019-02-05

## 2019-08-08 ENCOUNTER — TRANSFERRED RECORDS (OUTPATIENT)
Dept: HEALTH INFORMATION MANAGEMENT | Facility: CLINIC | Age: 84
End: 2019-08-08

## 2019-08-08 LAB
CREAT SERPL-MCNC: 0.8 MG/DL (ref 0.7–1.2)
GFR SERPL CREATININE-BSD FRML MDRD: >60 ML/MIN/1.73M2

## 2019-08-09 ENCOUNTER — HOSPITAL ENCOUNTER (OUTPATIENT)
Dept: CT IMAGING | Facility: HOSPITAL | Age: 84
Discharge: HOME | End: 2019-08-09
Attending: UROLOGY | Admitting: UROLOGY
Payer: MEDICARE

## 2019-08-09 DIAGNOSIS — N28.89 OTHER SPECIFIED DISORDERS OF KIDNEY AND URETER: ICD-10-CM

## 2019-08-09 PROCEDURE — 25500064 ZZH RX 255 OP 636: Performed by: RADIOLOGY

## 2019-08-09 PROCEDURE — 74177 CT ABD & PELVIS W/CONTRAST: CPT | Mod: TC

## 2019-08-09 RX ADMIN — IOHEXOL 100 ML: 300 INJECTION, SOLUTION INTRAVENOUS at 11:18

## 2019-10-22 ENCOUNTER — HOSPITAL ENCOUNTER (OUTPATIENT)
Dept: GENERAL RADIOLOGY | Facility: HOSPITAL | Age: 84
Discharge: HOME | End: 2019-10-22
Attending: FAMILY MEDICINE | Admitting: FAMILY MEDICINE
Payer: MEDICARE

## 2019-10-22 DIAGNOSIS — M19.90 OA (OSTEOARTHRITIS): ICD-10-CM

## 2019-10-22 DIAGNOSIS — M25.60 DECREASED RANGE OF MOTION: ICD-10-CM

## 2019-10-22 DIAGNOSIS — M25.552 LEFT HIP PAIN: ICD-10-CM

## 2019-10-22 PROCEDURE — 25000128 H RX IP 250 OP 636: Performed by: RADIOLOGY

## 2019-10-22 PROCEDURE — 25500064 ZZH RX 255 OP 636: Performed by: RADIOLOGY

## 2019-10-22 PROCEDURE — 25000125 ZZHC RX 250: Performed by: RADIOLOGY

## 2019-10-22 PROCEDURE — 20610 DRAIN/INJ JOINT/BURSA W/O US: CPT | Mod: TC,LT

## 2019-10-22 RX ORDER — LIDOCAINE HYDROCHLORIDE 10 MG/ML
INJECTION, SOLUTION EPIDURAL; INFILTRATION; INTRACAUDAL; PERINEURAL
Status: DISCONTINUED
Start: 2019-10-22 | End: 2019-10-23 | Stop reason: HOSPADM

## 2019-10-22 RX ORDER — IOPAMIDOL 612 MG/ML
50 INJECTION, SOLUTION INTRAVASCULAR ONCE
Status: COMPLETED | OUTPATIENT
Start: 2019-10-22 | End: 2019-10-22

## 2019-10-22 RX ORDER — LIDOCAINE HYDROCHLORIDE 10 MG/ML
5 INJECTION, SOLUTION EPIDURAL; INFILTRATION; INTRACAUDAL; PERINEURAL ONCE
Status: COMPLETED | OUTPATIENT
Start: 2019-10-22 | End: 2019-10-22

## 2019-10-22 RX ORDER — TRIAMCINOLONE ACETONIDE 40 MG/ML
40 INJECTION, SUSPENSION INTRA-ARTICULAR; INTRAMUSCULAR ONCE
Status: COMPLETED | OUTPATIENT
Start: 2019-10-22 | End: 2019-10-22

## 2019-10-22 RX ORDER — TRIAMCINOLONE ACETONIDE 40 MG/ML
INJECTION, SUSPENSION INTRA-ARTICULAR; INTRAMUSCULAR
Status: DISCONTINUED
Start: 2019-10-22 | End: 2019-10-23 | Stop reason: HOSPADM

## 2019-10-22 RX ADMIN — LIDOCAINE HYDROCHLORIDE 1 ML: 10 INJECTION, SOLUTION EPIDURAL; INFILTRATION; INTRACAUDAL; PERINEURAL at 13:23

## 2019-10-22 RX ADMIN — IOPAMIDOL 3 ML: 612 INJECTION, SOLUTION INTRAVENOUS at 13:22

## 2019-10-22 RX ADMIN — TRIAMCINOLONE ACETONIDE 40 MG: 40 INJECTION, SUSPENSION INTRA-ARTICULAR; INTRAMUSCULAR at 13:23

## 2019-10-22 RX ADMIN — LIDOCAINE HYDROCHLORIDE 5 ML: 10 INJECTION, SOLUTION EPIDURAL; INFILTRATION; INTRACAUDAL; PERINEURAL at 13:23

## 2019-10-22 NOTE — IP AVS SNAPSHOT
[address] [address]  Phone:  [phone]                                    After Visit Summary   10/22/2019    Kary Moody    MRN: [id number]           After Visit Summary Signature Page    I have received my discharge instructions, and my questions have been answered. I have discussed any challenges I see with this plan with the nurse or doctor.    ..........................................................................................................................................  Patient/Patient Representative Signature      ..........................................................................................................................................  Patient Representative Print Name and Relationship to Patient    ..................................................               ................................................  Date                                   Time    ..........................................................................................................................................  Reviewed by Signature/Title    ...................................................              ..............................................  Date                                               Time          22EPIC Rev 08/18

## 2019-10-22 NOTE — DISCHARGE INSTRUCTIONS
Home number on file [phone] (home)  Is it ok to leave a message at this number(s)? Yes    Dr Peña completed your procedure on 10/22/2019.    Current Pain Level (0-10 Scale): 8/10  Post Pain Level (0-10):  8/10    Radiology Discharge instructions for Steroid Injection    Activity Level:     Do not do any heavy activity or exercise for 24 hours.   Do not drive for 4 hours after your injection.  Diet:   Return to your normal diet.  Medications:   If you have stopped taking your Aspirin, Coumadin/Warfarin, Ibuprofen, or any   other blood thinner for this procedure you may resume in the morning unless   your primary care provider has given you other instructions.    Diabetics may see an increase in blood sugar after steroid injections. If you are concerned about your blood sugar, please contact your family doctor.    Site Care:  Remove the bandage and bathe or shower the morning after the procedure.      Please allow two weeks to experience improvement in your pain.  If you have any further issues, please contact your provider.    Call your Primary Care Provider if you have the following (if your primary care provider is not available please seek emergency care):   Nausea with vomiting   Severe headache   Drowsiness or confusion   Redness or drainage at the injection or puncture site   Temperature over 101 degrees F   Other concerns   Worsening back pain   Stiff neck

## 2020-03-15 ENCOUNTER — APPOINTMENT (OUTPATIENT)
Dept: GENERAL RADIOLOGY | Facility: HOSPITAL | Age: 85
End: 2020-03-15
Attending: INTERNAL MEDICINE
Payer: MEDICARE

## 2020-03-15 ENCOUNTER — TRANSFERRED RECORDS (OUTPATIENT)
Dept: HEALTH INFORMATION MANAGEMENT | Facility: CLINIC | Age: 85
End: 2020-03-15

## 2020-03-15 ENCOUNTER — HOSPITAL ENCOUNTER (EMERGENCY)
Facility: HOSPITAL | Age: 85
Discharge: ANOTHER HOSPITAL | End: 2020-03-15
Attending: INTERNAL MEDICINE | Admitting: INTERNAL MEDICINE
Payer: MEDICARE

## 2020-03-15 VITALS
DIASTOLIC BLOOD PRESSURE: 99 MMHG | WEIGHT: 169.2 LBS | SYSTOLIC BLOOD PRESSURE: 165 MMHG | OXYGEN SATURATION: 92 % | BODY MASS INDEX: 29.97 KG/M2 | RESPIRATION RATE: 15 BRPM | TEMPERATURE: 96.6 F | HEART RATE: 76 BPM

## 2020-03-15 DIAGNOSIS — I21.4 NSTEMI (NON-ST ELEVATED MYOCARDIAL INFARCTION) (H): ICD-10-CM

## 2020-03-15 LAB
ALBUMIN SERPL-MCNC: 3 G/DL (ref 3.4–5)
ALBUMIN UR-MCNC: NEGATIVE MG/DL
ALP SERPL-CCNC: 155 U/L (ref 40–150)
ALT SERPL W P-5'-P-CCNC: 62 U/L (ref 0–50)
ANION GAP SERPL CALCULATED.3IONS-SCNC: 7 MMOL/L (ref 3–14)
APPEARANCE UR: CLEAR
AST SERPL W P-5'-P-CCNC: 75 U/L (ref 0–45)
BACTERIA #/AREA URNS HPF: ABNORMAL /HPF
BASOPHILS # BLD AUTO: 0 10E9/L (ref 0–0.2)
BASOPHILS NFR BLD AUTO: 0.3 %
BILIRUB SERPL-MCNC: 0.5 MG/DL (ref 0.2–1.3)
BILIRUB UR QL STRIP: NEGATIVE
BUN SERPL-MCNC: 16 MG/DL (ref 7–30)
CALCIUM SERPL-MCNC: 9.3 MG/DL (ref 8.5–10.1)
CHLORIDE SERPL-SCNC: 105 MMOL/L (ref 94–109)
CK SERPL-CCNC: 169 U/L (ref 30–225)
CO2 SERPL-SCNC: 25 MMOL/L (ref 20–32)
COLOR UR AUTO: ABNORMAL
CREAT SERPL-MCNC: 0.68 MG/DL (ref 0.52–1.04)
CRP SERPL-MCNC: 92.5 MG/L (ref 0–8)
DIFFERENTIAL METHOD BLD: ABNORMAL
EOSINOPHIL # BLD AUTO: 0.1 10E9/L (ref 0–0.7)
EOSINOPHIL NFR BLD AUTO: 1.7 %
ERYTHROCYTE [DISTWIDTH] IN BLOOD BY AUTOMATED COUNT: 13.3 % (ref 10–15)
GFR SERPL CREATININE-BSD FRML MDRD: 79 ML/MIN/{1.73_M2}
GLUCOSE SERPL-MCNC: 100 MG/DL (ref 70–99)
GLUCOSE UR STRIP-MCNC: NEGATIVE MG/DL
HCT VFR BLD AUTO: 33.7 % (ref 35–47)
HGB BLD-MCNC: 11.3 G/DL (ref 11.7–15.7)
HGB UR QL STRIP: ABNORMAL
IMM GRANULOCYTES # BLD: 0.1 10E9/L (ref 0–0.4)
IMM GRANULOCYTES NFR BLD: 1 %
KETONES UR STRIP-MCNC: NEGATIVE MG/DL
LACTATE BLD-SCNC: 1 MMOL/L (ref 0.7–2)
LEUKOCYTE ESTERASE UR QL STRIP: NEGATIVE
LYMPHOCYTES # BLD AUTO: 1 10E9/L (ref 0.8–5.3)
LYMPHOCYTES NFR BLD AUTO: 16.7 %
MCH RBC QN AUTO: 29.3 PG (ref 26.5–33)
MCHC RBC AUTO-ENTMCNC: 33.5 G/DL (ref 31.5–36.5)
MCV RBC AUTO: 87 FL (ref 78–100)
MONOCYTES # BLD AUTO: 1.3 10E9/L (ref 0–1.3)
MONOCYTES NFR BLD AUTO: 21.8 %
MUCOUS THREADS #/AREA URNS LPF: PRESENT /LPF
NEUTROPHILS # BLD AUTO: 3.5 10E9/L (ref 1.6–8.3)
NEUTROPHILS NFR BLD AUTO: 58.5 %
NITRATE UR QL: NEGATIVE
NRBC # BLD AUTO: 0 10*3/UL
NRBC BLD AUTO-RTO: 0 /100
PH UR STRIP: 5 PH (ref 4.7–8)
PLATELET # BLD AUTO: 129 10E9/L (ref 150–450)
POTASSIUM SERPL-SCNC: 3.9 MMOL/L (ref 3.4–5.3)
PROT SERPL-MCNC: 6.5 G/DL (ref 6.8–8.8)
RBC # BLD AUTO: 3.86 10E12/L (ref 3.8–5.2)
RBC #/AREA URNS AUTO: <1 /HPF (ref 0–2)
SODIUM SERPL-SCNC: 137 MMOL/L (ref 133–144)
SOURCE: ABNORMAL
SP GR UR STRIP: 1.01 (ref 1–1.03)
TROPONIN I SERPL-MCNC: 5.07 UG/L (ref 0–0.04)
UROBILINOGEN UR STRIP-MCNC: NORMAL MG/DL (ref 0–2)
WBC # BLD AUTO: 5.9 10E9/L (ref 4–11)
WBC #/AREA URNS AUTO: 2 /HPF (ref 0–5)

## 2020-03-15 PROCEDURE — 36415 COLL VENOUS BLD VENIPUNCTURE: CPT | Performed by: INTERNAL MEDICINE

## 2020-03-15 PROCEDURE — 86140 C-REACTIVE PROTEIN: CPT | Performed by: INTERNAL MEDICINE

## 2020-03-15 PROCEDURE — 96376 TX/PRO/DX INJ SAME DRUG ADON: CPT

## 2020-03-15 PROCEDURE — 96365 THER/PROPH/DIAG IV INF INIT: CPT

## 2020-03-15 PROCEDURE — 71046 X-RAY EXAM CHEST 2 VIEWS: CPT | Mod: TC

## 2020-03-15 PROCEDURE — 80053 COMPREHEN METABOLIC PANEL: CPT | Performed by: INTERNAL MEDICINE

## 2020-03-15 PROCEDURE — 25000132 ZZH RX MED GY IP 250 OP 250 PS 637: Performed by: INTERNAL MEDICINE

## 2020-03-15 PROCEDURE — 93005 ELECTROCARDIOGRAM TRACING: CPT

## 2020-03-15 PROCEDURE — 81001 URINALYSIS AUTO W/SCOPE: CPT | Performed by: INTERNAL MEDICINE

## 2020-03-15 PROCEDURE — 84484 ASSAY OF TROPONIN QUANT: CPT | Performed by: INTERNAL MEDICINE

## 2020-03-15 PROCEDURE — 93010 ELECTROCARDIOGRAM REPORT: CPT | Performed by: INTERNAL MEDICINE

## 2020-03-15 PROCEDURE — 73030 X-RAY EXAM OF SHOULDER: CPT | Mod: TC,LT

## 2020-03-15 PROCEDURE — 25000128 H RX IP 250 OP 636: Performed by: INTERNAL MEDICINE

## 2020-03-15 PROCEDURE — 85025 COMPLETE CBC W/AUTO DIFF WBC: CPT | Performed by: INTERNAL MEDICINE

## 2020-03-15 PROCEDURE — 99285 EMERGENCY DEPT VISIT HI MDM: CPT | Mod: 25

## 2020-03-15 PROCEDURE — 99283 EMERGENCY DEPT VISIT LOW MDM: CPT | Mod: Z6 | Performed by: INTERNAL MEDICINE

## 2020-03-15 PROCEDURE — 83605 ASSAY OF LACTIC ACID: CPT | Performed by: INTERNAL MEDICINE

## 2020-03-15 PROCEDURE — 82550 ASSAY OF CK (CPK): CPT | Performed by: INTERNAL MEDICINE

## 2020-03-15 RX ORDER — OXYCODONE HYDROCHLORIDE 5 MG/1
5 TABLET ORAL ONCE
Status: DISCONTINUED | OUTPATIENT
Start: 2020-03-15 | End: 2020-03-15 | Stop reason: HOSPADM

## 2020-03-15 RX ORDER — LOSARTAN POTASSIUM 25 MG/1
25 TABLET ORAL 2 TIMES DAILY
Status: ON HOLD | COMMUNITY
End: 2020-12-20

## 2020-03-15 RX ORDER — MELOXICAM 15 MG/1
15 TABLET ORAL DAILY
COMMUNITY
End: 2020-12-20

## 2020-03-15 RX ORDER — CLOPIDOGREL 300 MG/1
300 TABLET, FILM COATED ORAL ONCE
Status: COMPLETED | OUTPATIENT
Start: 2020-03-15 | End: 2020-03-15

## 2020-03-15 RX ORDER — AMLODIPINE BESYLATE 5 MG/1
5 TABLET ORAL DAILY
COMMUNITY

## 2020-03-15 RX ORDER — ASPIRIN 325 MG
325 TABLET ORAL ONCE
Status: COMPLETED | OUTPATIENT
Start: 2020-03-15 | End: 2020-03-15

## 2020-03-15 RX ORDER — HEPARIN SODIUM 10000 [USP'U]/100ML
0-3500 INJECTION, SOLUTION INTRAVENOUS CONTINUOUS
Status: DISCONTINUED | OUTPATIENT
Start: 2020-03-15 | End: 2020-03-15 | Stop reason: HOSPADM

## 2020-03-15 RX ORDER — CLOPIDOGREL 300 MG/1
300 TABLET, FILM COATED ORAL ONCE
Status: DISCONTINUED | OUTPATIENT
Start: 2020-03-15 | End: 2020-03-15

## 2020-03-15 RX ORDER — NITROGLYCERIN 0.4 MG/1
0.4 TABLET SUBLINGUAL ONCE
Status: COMPLETED | OUTPATIENT
Start: 2020-03-15 | End: 2020-03-15

## 2020-03-15 RX ADMIN — CLOPIDOGREL BISULFATE 300 MG: 300 TABLET, FILM COATED ORAL at 05:48

## 2020-03-15 RX ADMIN — ASPIRIN 325 MG ORAL TABLET 325 MG: 325 PILL ORAL at 05:48

## 2020-03-15 RX ADMIN — NITROGLYCERIN 0.4 MG: 0.4 TABLET SUBLINGUAL at 05:49

## 2020-03-15 RX ADMIN — HEPARIN SODIUM 921 UNITS/HR: 10000 INJECTION, SOLUTION INTRAVENOUS at 05:56

## 2020-03-15 ASSESSMENT — ENCOUNTER SYMPTOMS
FEVER: 0
HEMATURIA: 0
ABDOMINAL PAIN: 0
DIAPHORESIS: 0
CHILLS: 0
NAUSEA: 0
CHEST TIGHTNESS: 0
DYSURIA: 0
FLANK PAIN: 0
ABDOMINAL DISTENTION: 0
WOUND: 0
NUMBNESS: 1
NECK PAIN: 0
CONFUSION: 0
VOMITING: 0
WHEEZING: 0
VOICE CHANGE: 0
SHORTNESS OF BREATH: 0
DIZZINESS: 0
MYALGIAS: 0
COUGH: 0
HEADACHES: 0
LIGHT-HEADEDNESS: 0
ARTHRALGIAS: 0
BACK PAIN: 0
ANAL BLEEDING: 0
COLOR CHANGE: 0
PALPITATIONS: 0
BLOOD IN STOOL: 0
NECK STIFFNESS: 0

## 2020-03-15 NOTE — ED PROVIDER NOTES
History     Chief Complaint   Patient presents with     Shoulder Pain     left side     The history is provided by the patient.   Arm Pain   Location:  Arm and shoulder  Shoulder location:  L shoulder  Pain details:     Radiates to:  L arm  Associated symptoms: no back pain, no fever and no neck pain      Allergies:  Allergies   Allergen Reactions     Atorvastatin      Celebrex [Celecoxib] Nausea and Vomiting     Codeine Phosphate      Flu Virus Vaccine      Ibuprofen Sodium      Latex Hives     Lisinopril Cough     Penicillins      Welchol [Colesevelam]      Niaspan [Niacin] Rash     Upset Stomach       Problem List:    Patient Active Problem List    Diagnosis Date Noted     Pneumonia 08/02/2018     Priority: Medium     ST elevation myocardial infarction involving right coronary artery (H) 08/02/2018     Priority: Medium     Chest pain 05/28/2016     Priority: Medium        Past Medical History:    History reviewed. No pertinent past medical history.    Past Surgical History:    History reviewed. No pertinent surgical history.    Family History:    History reviewed. No pertinent family history.    Social History:  Marital Status:   [2]  Social History     Tobacco Use     Smoking status: Never Smoker     Smokeless tobacco: Never Used   Substance Use Topics     Alcohol use: No     Drug use: No        Medications:    amLODIPine (NORVASC) 5 MG tablet  aspirin 81 MG tablet  Clopidogrel Bisulfate (PLAVIX PO)  FOLIC ACID PO  losartan (COZAAR) 25 MG tablet  meloxicam (MOBIC) 15 MG tablet  Nitroglycerin (NITROTAB SL)  METOPROLOL TARTRATE PO  polyethylene glycol (MIRALAX/GLYCOLAX) Packet  THYROID PO          Review of Systems   Constitutional: Negative for chills, diaphoresis and fever.   HENT: Negative for voice change.    Eyes: Negative for visual disturbance.   Respiratory: Negative for cough, chest tightness, shortness of breath and wheezing.    Cardiovascular: Negative for chest pain, palpitations and leg  swelling.   Gastrointestinal: Negative for abdominal distention, abdominal pain, anal bleeding, blood in stool, nausea and vomiting.   Genitourinary: Negative for decreased urine volume, dysuria, flank pain and hematuria.   Musculoskeletal: Negative for arthralgias, back pain, gait problem, myalgias, neck pain and neck stiffness.   Skin: Negative for color change, pallor, rash and wound.   Neurological: Positive for numbness. Negative for dizziness, syncope, light-headedness and headaches.   Psychiatric/Behavioral: Negative for confusion and suicidal ideas.       Physical Exam   BP: 160/88  Pulse: 76  Heart Rate: 68  Temp: 96.6  F (35.9  C)  Resp: 16  Weight: 76.8 kg (169 lb 3.3 oz)  SpO2: 97 %      Physical Exam  Vitals signs and nursing note reviewed.   Constitutional:       Appearance: She is well-developed.   HENT:      Head: Normocephalic and atraumatic.      Mouth/Throat:      Pharynx: No oropharyngeal exudate.   Eyes:      Conjunctiva/sclera: Conjunctivae normal.      Pupils: Pupils are equal, round, and reactive to light.   Neck:      Musculoskeletal: Normal range of motion and neck supple.      Thyroid: No thyromegaly.      Vascular: No JVD.      Trachea: No tracheal deviation.   Cardiovascular:      Rate and Rhythm: Normal rate and regular rhythm.      Heart sounds: Normal heart sounds. No murmur. No friction rub. No gallop.    Pulmonary:      Effort: Pulmonary effort is normal. No respiratory distress.      Breath sounds: Normal breath sounds. No stridor. No wheezing or rales.   Chest:      Chest wall: Tenderness present.   Abdominal:      General: Bowel sounds are normal. There is no distension.      Palpations: Abdomen is soft. There is no mass.      Tenderness: There is no abdominal tenderness. There is no guarding or rebound.   Musculoskeletal: Normal range of motion.         General: No tenderness.   Lymphadenopathy:      Cervical: No cervical adenopathy.   Skin:     General: Skin is warm and dry.       Coloration: Skin is not pale.      Findings: No erythema or rash.   Neurological:      Mental Status: She is alert and oriented to person, place, and time.   Psychiatric:         Behavior: Behavior normal.         ED Course        Procedures                   Results for orders placed or performed during the hospital encounter of 03/15/20 (from the past 24 hour(s))   CBC with platelets differential   Result Value Ref Range    WBC 5.9 4.0 - 11.0 10e9/L    RBC Count 3.86 3.8 - 5.2 10e12/L    Hemoglobin 11.3 (L) 11.7 - 15.7 g/dL    Hematocrit 33.7 (L) 35.0 - 47.0 %    MCV 87 78 - 100 fl    MCH 29.3 26.5 - 33.0 pg    MCHC 33.5 31.5 - 36.5 g/dL    RDW 13.3 10.0 - 15.0 %    Platelet Count 129 (L) 150 - 450 10e9/L    Diff Method PENDING    CK total   Result Value Ref Range    CK Total 169 30 - 225 U/L   Comprehensive metabolic panel   Result Value Ref Range    Sodium 137 133 - 144 mmol/L    Potassium 3.9 3.4 - 5.3 mmol/L    Chloride 105 94 - 109 mmol/L    Carbon Dioxide 25 20 - 32 mmol/L    Anion Gap 7 3 - 14 mmol/L    Glucose 100 (H) 70 - 99 mg/dL    Urea Nitrogen 16 7 - 30 mg/dL    Creatinine 0.68 0.52 - 1.04 mg/dL    GFR Estimate 79 >60 mL/min/[1.73_m2]    GFR Estimate If Black >90 >60 mL/min/[1.73_m2]    Calcium 9.3 8.5 - 10.1 mg/dL    Bilirubin Total 0.5 0.2 - 1.3 mg/dL    Albumin 3.0 (L) 3.4 - 5.0 g/dL    Protein Total 6.5 (L) 6.8 - 8.8 g/dL    Alkaline Phosphatase 155 (H) 40 - 150 U/L    ALT 62 (H) 0 - 50 U/L    AST 75 (H) 0 - 45 U/L   CRP inflammation   Result Value Ref Range    CRP Inflammation 92.5 (H) 0.0 - 8.0 mg/L   Lactic acid whole blood   Result Value Ref Range    Lactic Acid 1.0 0.7 - 2.0 mmol/L   Troponin I   Result Value Ref Range    Troponin I ES 5.071 (HH) 0.000 - 0.045 ug/L   UA with Microscopic reflex to Culture    Specimen: Urine clean catch; Midstream Urine   Result Value Ref Range    Color Urine Light Yellow     Appearance Urine Clear     Glucose Urine Negative NEG^Negative mg/dL    Bilirubin  Urine Negative NEG^Negative    Ketones Urine Negative NEG^Negative mg/dL    Specific Gravity Urine 1.009 1.003 - 1.035    Blood Urine Trace (A) NEG^Negative    pH Urine 5.0 4.7 - 8.0 pH    Protein Albumin Urine Negative NEG^Negative mg/dL    Urobilinogen mg/dL Normal 0.0 - 2.0 mg/dL    Nitrite Urine Negative NEG^Negative    Leukocyte Esterase Urine Negative NEG^Negative    Source Midstream Urine     WBC Urine 2 0 - 5 /HPF    RBC Urine <1 0 - 2 /HPF    Bacteria Urine None (A) NEG^Negative /HPF    Mucous Urine Present (A) NEG^Negative /LPF       Medications   oxyCODONE (ROXICODONE) tablet 5 mg (has no administration in time range)   heparin  drip 25,000 units in 0.45% NaCl 250 mL  ANTICOAGULANT  (see additional administration details for dose) (has no administration in time range)   aspirin (ASA) tablet 325 mg (325 mg Oral Given 3/15/20 0548)   heparin ANTICOAGULANT Loading dose from infusion pump *Give when STARTING heparin infusion 4,600 Units (4,600 Units Intravenous Given 3/15/20 0555)   nitroGLYcerin (NITROSTAT) sublingual tablet 0.4 mg (0.4 mg Sublingual Given 3/15/20 0549)   clopidogrel (PLAVIX) tablet 300 mg (300 mg Oral Given 3/15/20 0548)       Assessments & Plan (with Medical Decision Making)   Woke up with Left shoulder pain, took 3 nitroglycerin at home, her pain improved  Refused pain killer at time of arrival in ER  EKG: NSR, T inversion in V2, III, AVF  Labs reviewed  Trop: 5.0  CXR: no acute finding  Heparin drip, ASA, plavix started  I spoke to Dr Yen in [facility] for transfer    I have reviewed the nursing notes.    I have reviewed the findings, diagnosis, plan and need for follow up with the patient.      New Prescriptions    No medications on file       Final diagnoses:   NSTEMI (non-ST elevated myocardial infarction) (H)       3/15/2020   [facility]     Ayan Mccoy MD  03/15/20 0602

## 2020-03-15 NOTE — ED TRIAGE NOTES
Patient ambulatory to ED room 2 with  accompanying. Patient states that she awoke around midnight with left shoulder pain that radiated up into the jaw and down the left arm. Patient states that she did take nitroglycerin SL x3 with no relief. Patient into gown and placed on cardiac monitor.

## 2020-05-29 ENCOUNTER — TELEPHONE (OUTPATIENT)
Dept: CARDIAC REHAB | Facility: HOSPITAL | Age: 85
End: 2020-05-29

## 2020-05-29 NOTE — TELEPHONE ENCOUNTER
Patient was called to initiate Phase II Cardiac Rehab. Message was left to return call at [phone].

## 2020-06-12 ENCOUNTER — HOSPITAL ENCOUNTER (OUTPATIENT)
Dept: CARDIAC REHAB | Facility: HOSPITAL | Age: 85
Setting detail: THERAPIES SERIES
End: 2020-06-12
Attending: FAMILY MEDICINE
Payer: MEDICARE

## 2020-06-12 PROCEDURE — 40000116 ZZH STATISTIC OP CR VISIT

## 2020-06-12 PROCEDURE — 93798 PHYS/QHP OP CAR RHAB W/ECG: CPT

## 2020-06-12 ASSESSMENT — 6 MINUTE WALK TEST (6MWT)
PREDICTED: 1096.36
MALE CALC: 1089.72
GENDER SELECTION: FEMALE
TOTAL DISTANCE WALKED (FT): 980
FEMALE CALC: 1074.15

## 2020-06-12 ASSESSMENT — MIFFLIN-ST. JEOR: SCORE: 1193.85

## 2020-06-12 NOTE — PROGRESS NOTES
06/12/20 0900   Session   Session Initial Evaluation and Exercise Prescription   Certified through this date 07/11/20   Cardiac Rehab Assessment   Cardiac Rehab Assessment 6/12: Patient attends her initial appointment for Phase II Cardiac Rehab today after having an NSTEMI and BO in March of this year. She has been doing well, but continues to be short of breath with exertion. She has been walking up to 8 blocks each day recently. She has gradually worked up to that distance. Patient is wearing a cloth mask that she felt did increase her shortness of breath.   Patient has a history of hypertension and is currently taking metoprolol and amlodipine. Staff will monitor blood pressure during her exercise sessions and notify provider if increased BP.   Patient will be provided a safe space to exercise. Staff will wear masks and face shields, patients will wear masks when able. Staff will be sanitizing equipment in between patient use. Social distancing is the expectation during exercise classes.   The patient's history and clinical status including hemodynamics and ECG were evaluated. The patient was assessed to be stable and appropriate to begin exercise.   The patient's functional capacity and exercise prescription were determined by the completion of the 6 minute walk test. See results above. The patient was oriented to the program.  Risk factor profile was completed. Goals and objectives were discussed. CV response was WNL. No symptoms, complaints or pain were reported. Good prognosis for reaching goals below. Skilled therapy is necessary in order to monitor CV response to exercise, to provide education on risk factors and behavior change counseling needed to achieve patient's goals.  Plan to progress to 30-40 minutes of exercise prior to discharge from cardiac rehab.  Initial THR of 20-30 beats above RHR; Effort rating of 4-6. Initiate muscle conditioning as appropriate. Provide risk factor education and  behavior change counseling.      General Information   Treatment Diagnosis NSTEMI   Date of Treatment Diagnosis 03/15/20   Secondary Treatment Diagnosis Stent   Significant Past CV History None   Comorbidities None   Other Medical History TIA, arthritis, hip replacement, knee replacement and rotator cuff surgeries, gall bladder surgery, appendectomy   Lead up symptoms Patient had pain in shoulder going down to her fingers, with tingling [facility] Location [facility] Discharge Date 03/17/20   Signs and Symptoms Post Hospital Discharge SOB   Comments Patient states she had been short of breath with exertion prior to event.    Outpatient Cardiac Rehab Start Date 06/12/20   Primary Physician Brent   Primary Physician Follow Up Completed   Surgeon Brennon   Surgeon Follow Up Completed   Cardiologist Wander   Cardiologist Follow Up Completed   Risk Stratification Moderate   Summary of Cath Report   Summary of Cath Report Available   Date Performed 03/15/20   Left Main Calcified with preserved lumen bifurcates into LAD and LCX.   LAD Calcified with minimal luminal irregularities gives off large diag with mid LAD and distal LAD are small caliber vessels.    LCX small caliber tortuous in the prox segment with minimal luminal irreg, gives off 2.0 mm OM,mid LCS is normal, AV groove distal LCX is small   RCA large caliber vessel, culprit with a calcified focal stenosis of 99%, mid RCA is tortuous and calicified without flow limiting stenosis, distal RCA is a large caliber vessel with a large PDA and VERÓNICA branches which are normal.    Cath Report Comments LCX stented with 4.5 X 12 mm Synergy BO.   Living and Work Status    Living Arrangements and Social Status house   Support System Live with an adult  (Children check in on her daily. 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    Preventative Medications   CMS recommended medications Ace inhibitors;Beta  "Blocker;Lipid Lowering;Influenza vaccination;Pneumonia vaccination;Antiplatelets   Fall Risk Screen   Fall screen completed by Cardiac Rehab   Have you fallen 2 or more times in the past year? No   Have you fallen and had an injury in the past year? No   Is patient a fall risk? No   Fall screen comments Patient has not fallen in the recent past and is not a falls risk at this time.    Abuse Screen (yes response referral indicated)   Feels Unsafe at Home or Work/School no   Feels Threatened by Someone no   Does Anyone Try to Keep You From Having Contact with Others or Doing Things Outside Your Home? no   Physical Signs of Abuse Present no   Pain   Patient Currently in Pain Denies   Physical Assessments   Incisions WNL   Edema +1 Trace   Right Lung Sounds not assessed   Left Lung Sounds not assessed   Limitations Arthritis   Comments Patient has some arthritis, but does not seem to let it stop her from doing her ADL's.    Individualized Treatment Plan   Monitored Sessions Scheduled 24   Monitored Sessions Attended 1   Oxygen   Supplemental Oxygen needed No   Nutrition Management - Weight Management   Assessment Initial Assessment   Age 86   Weight 78.5 kg (173 lb)   Height 1.6 m (5' 3\")   BMI (Calculated) 30.65   Goal Weight 68 kg (150 lb)  (long term goal)   Initial Rate Your Plate Score. Dietary tool to assess eating patterns. Scores range from 24 to 72. The higher the score the healthier the eating pattern. 63   Weight Management Comments Patient states she has gained some weight recently as she has been sheltering at home during Covid-19 pandemic.    Nutrition Management - Lipids   Lipids Labs Not Available   Prescribed Lipid Medication Yes   Statin Intensity High Intensity   Lipid Comments Patient takes her medications as prescribed.    Nutrition Management - Diabetes   Diabetes No   Nutrition Management Summary   Dietary Recommendations Low Fat;Low Cholesterol;Low Sodium   Stages of Change for Diet Compliance " Action   Interventions Planned Educate on Weight Management Principles   Nutrition Summary Comments Patient states she needs to stop baking so many sweets.    Nutrition Target Outcome Weight loss .5-1 lb/week (if BMI > 25)   Psychosocial Management   Psychosocial Assessment Initial   Is there history of clinical depression or increased risk of depression? No previous history   Current Level of Stress per Patient Report Denies   Current Coping Skills Uses Stress Management/Relaxation Techniques   Initial Patient Health Questionnaire -9 Score (PHQ-9) for depression. 5-9 Minimal symptoms, 10-14 Minor depression, 15-19 Major depression, moderately severe, > 20 Major depression, severe  1   Initial [facility] Survey score.  Quality of Life:   If total score > 25 review individual areas where patient rated a 4 or 5.  Consider patients current medical condition and what role that plays on the score.   Adjust treatment protocol to improve areas of concern.  Consider the following:  PHQ9 score, DASI, and re-assessment within the next 30 days to assist with developing treatments.  17   Stages of Change Preparation   Interventions Planned Patient denies need for intervention at this time.   Psychosocial Comments Patient does not think she has any stress. She uses prayer if needed.    Psychosocial Target Outcome Maximize coping skills   Other Core Components - Hypertension   History of or Diagnosis of Hypertension Yes   Currently taking Anti-Hypertensives Yes;Beta blocker;CCB   Hypertension Comments Patient takes her medications as prescribed.    Other Core Components - Tobacco   History of Tobacco Use Never   Other Core Components Summary   Interventions Planned Instruct patient on the DASH diet   Other Core Components Comments Patient is taking her blood pressure medications as prescribed.   Other Core Components Target Outcome BP < 140/90 or < 130/80 with DM or CKD   Activity/Exercise History   Activity/Exercise  Assessment Initial   Activity/Exercise Status prior to event? Was Physically Active;Participated in an Exercise Program   Number of Days Currently participating in Moderate Physical Activity? 7   Number of Days Currently performing  Aerobic Exercise (including rehab)? 7   Number of Minutes per Session Currently of Aerobic Exercise (average)? 30   Current Stage of Change (Physical Activity) Action   Current Stage of Change (Aerobic Exercise) Preparation   Patient Goals Goal #1   Goal #1 Description Patient would like to lose .5 to 1 lb per week by increasing her exercise weekly and decreasing the number of sweets she eats at home.    Goal #1 Target Date 08/12/20   Activity/Exercise Comments Patient will attend CR three times per week.    Activity/Exercise Target Outcome An Accumulation of 150  Minutes of Aerobic Activity per Week   Exercise Assessment   6 Minute Walk Predicted - Gender Selection Female   6 Minute Walk Predicted (Male) 1089.72   6 Minute Walk Predicted (Female) 1074.15   Initial 6 Minute Walk Distance (Feet) 980 ft   Resting HR 85 bpm   Exercise  bpm   Post Exercise HR 90 bpm   Resting /74   Exercise /78   Post Exercise /74   Pre SpO2 98   While Exercising SpO2 93   Post SpO2 97   Effort Rating 5   Current MET Level 2.4   MET Level Goal 3-4   ECG Rhythm Sinus arrhythmia   Ectopy None   Current Symptoms Dyspnea   Limitations/Restrictions None   Exercise Prescription   Mode Nustep;Recumbant bike;Arm Ergometer;Weights   Duration/Time 15-30 min   Frequency 3 daysweek   THR (85% of age predicted max HR) 113.9   OMNI Effort Rating (0-10 Scale) 4-6/10   Progression Intermittent bouts;Total exercise time of 20-30 minutes;Aerobic exercise to OMNI rating of 5-7, and heart rate at or below target   Comments Patient does not want to use treadmill as it bothers her ankles.    Recommended Home Exercise   Type of Exercise Walking   Frequency (days per week) 7   Duration (minutes per session)  15-30 min   Effort Rating Recommended 4-6/10   30 Day Exercise Plan Patient will gradually increase the distance she walks.    Current Home Exercise   Type of Exercise Walking   Frequency (days per week) 7   Duration (minutes per session)   (Patient is walking 8 blocks per day at this time. )   Follow-up/On-going Support   Provider follow-up needed on the following No follow-up needed   Learning Assessment   Learner Patient   Primary Language English   Preferred Learning Style Reading   Barriers to Learning No barriers noted   Patient Education   Education recommended Anatomy and Physiology of the Heart;Blood Pressure;Exercise Principles;Medication Overview;Muscle Conditioning;Nutrition;Stress Management   Education Comments Education classes have not been offered recently due to Covid-19. Patient will be given one on one education as needed.    Physician cosignature/electronic signature indicates approval of this ITP document. I have established, reviewed and made necessary changes to the individualized treatment plan and exercise prescription for this patient.

## 2020-06-15 ENCOUNTER — HOSPITAL ENCOUNTER (OUTPATIENT)
Dept: CARDIAC REHAB | Facility: HOSPITAL | Age: 85
Setting detail: THERAPIES SERIES
End: 2020-06-15
Attending: FAMILY MEDICINE
Payer: MEDICARE

## 2020-06-15 PROCEDURE — 40000116 ZZH STATISTIC OP CR VISIT

## 2020-06-15 PROCEDURE — 93798 PHYS/QHP OP CAR RHAB W/ECG: CPT

## 2020-06-17 ENCOUNTER — HOSPITAL ENCOUNTER (OUTPATIENT)
Dept: CARDIAC REHAB | Facility: HOSPITAL | Age: 85
Setting detail: THERAPIES SERIES
End: 2020-06-17
Attending: FAMILY MEDICINE
Payer: MEDICARE

## 2020-06-17 PROCEDURE — 40000116 ZZH STATISTIC OP CR VISIT

## 2020-06-17 PROCEDURE — 93798 PHYS/QHP OP CAR RHAB W/ECG: CPT

## 2020-06-19 ENCOUNTER — HOSPITAL ENCOUNTER (OUTPATIENT)
Dept: CARDIAC REHAB | Facility: HOSPITAL | Age: 85
Setting detail: THERAPIES SERIES
End: 2020-06-19
Attending: FAMILY MEDICINE
Payer: MEDICARE

## 2020-06-19 PROCEDURE — 93798 PHYS/QHP OP CAR RHAB W/ECG: CPT

## 2020-06-19 PROCEDURE — 40000116 ZZH STATISTIC OP CR VISIT

## 2020-06-22 ENCOUNTER — HOSPITAL ENCOUNTER (OUTPATIENT)
Dept: CARDIAC REHAB | Facility: HOSPITAL | Age: 85
Setting detail: THERAPIES SERIES
End: 2020-06-22
Attending: FAMILY MEDICINE
Payer: MEDICARE

## 2020-06-22 PROCEDURE — 93798 PHYS/QHP OP CAR RHAB W/ECG: CPT

## 2020-06-22 PROCEDURE — 40000116 ZZH STATISTIC OP CR VISIT

## 2020-06-24 ENCOUNTER — HOSPITAL ENCOUNTER (OUTPATIENT)
Dept: CARDIAC REHAB | Facility: HOSPITAL | Age: 85
Setting detail: THERAPIES SERIES
End: 2020-06-24
Attending: FAMILY MEDICINE
Payer: MEDICARE

## 2020-06-24 PROCEDURE — 93798 PHYS/QHP OP CAR RHAB W/ECG: CPT

## 2020-06-24 PROCEDURE — 40000116 ZZH STATISTIC OP CR VISIT

## 2020-06-26 ENCOUNTER — HOSPITAL ENCOUNTER (OUTPATIENT)
Dept: CARDIAC REHAB | Facility: HOSPITAL | Age: 85
Setting detail: THERAPIES SERIES
End: 2020-06-26
Attending: FAMILY MEDICINE
Payer: MEDICARE

## 2020-06-26 PROCEDURE — 40000116 ZZH STATISTIC OP CR VISIT

## 2020-06-26 PROCEDURE — 93798 PHYS/QHP OP CAR RHAB W/ECG: CPT

## 2020-06-29 ENCOUNTER — HOSPITAL ENCOUNTER (OUTPATIENT)
Dept: CARDIAC REHAB | Facility: HOSPITAL | Age: 85
Setting detail: THERAPIES SERIES
End: 2020-06-29
Attending: FAMILY MEDICINE
Payer: MEDICARE

## 2020-06-29 PROCEDURE — 93798 PHYS/QHP OP CAR RHAB W/ECG: CPT

## 2020-06-29 PROCEDURE — 40000116 ZZH STATISTIC OP CR VISIT

## 2020-07-01 ENCOUNTER — HOSPITAL ENCOUNTER (OUTPATIENT)
Dept: CARDIAC REHAB | Facility: HOSPITAL | Age: 85
Setting detail: THERAPIES SERIES
End: 2020-07-01
Attending: FAMILY MEDICINE
Payer: MEDICARE

## 2020-07-01 PROCEDURE — 93798 PHYS/QHP OP CAR RHAB W/ECG: CPT

## 2020-07-01 PROCEDURE — 40000116 ZZH STATISTIC OP CR VISIT

## 2020-07-06 ENCOUNTER — HOSPITAL ENCOUNTER (OUTPATIENT)
Dept: CARDIAC REHAB | Facility: HOSPITAL | Age: 85
Setting detail: THERAPIES SERIES
End: 2020-07-06
Attending: FAMILY MEDICINE
Payer: MEDICARE

## 2020-07-06 PROCEDURE — 40000116 ZZH STATISTIC OP CR VISIT

## 2020-07-06 PROCEDURE — 93798 PHYS/QHP OP CAR RHAB W/ECG: CPT

## 2020-07-07 VITALS — WEIGHT: 168 LBS | BODY MASS INDEX: 29.77 KG/M2 | HEIGHT: 63 IN

## 2020-07-07 ASSESSMENT — 6 MINUTE WALK TEST (6MWT)
PREDICTED: 1109.56
MALE CALC: 1102.84
TOTAL DISTANCE WALKED (FT): 980
GENDER SELECTION: FEMALE
FEMALE CALC: 1091.22

## 2020-07-07 ASSESSMENT — MIFFLIN-ST. JEOR: SCORE: 1171.17

## 2020-07-07 NOTE — PROGRESS NOTES
07/07/20 1000   Session   Session 30 Day Individualized Treatment Plan   Certified through this date 08/05/20   Cardiac Rehab Assessment   Cardiac Rehab Assessment 7/7: Pt has completed 10 sessions of aerobic exercise thus far: 10 exercise sessions and 0 educational sessions. Overall, pt is progressing well and achieves 33 minutes of exercise without event. Occasionally, pt will state their arthritis increases with exercise and often times makes adjustments to reduce pain. Pt has the intention to complete the 36 sessions of Phase II rehab and staff will intend to increase modalities as tolerated.     Education will not be provided due to recent COVID-19 pandemic but staff will provide material as needed.       General Information   Treatment Diagnosis NSTEMI   Date of Treatment Diagnosis 03/15/20   Secondary Treatment Diagnosis Stent   Significant Past CV History None   Comorbidities None   Other Medical History TIA, arthritis, hip replacement, knee replacement and rotator cuff surgeries, gall bladder surgery, appendectomy   Lead up symptoms Patient experienced pain in their shoulder that radiated to their fingers; tingling sensation experienced in fingertips.   Hospital Location Counts include 234 beds at the [facility] Discharge Date 03/17/20   Signs and Symptoms Post Hospital Discharge SOB   Outpatient Cardiac Rehab Start Date 06/12/20   Primary Physician Chandni   Primary Physician Follow Up Completed   Surgeon Brennon   Surgeon Follow Up Completed   Cardiologist Wander   Cardiologist Follow Up Completed   Risk Stratification Moderate   Summary of Cath Report   Summary of Cath Report Available   Date Performed 03/15/20   Left Main Calcified with preserved lumen bifurcates into LAD and LCX.   LAD Calcified with minimal luminal irregularities gives off large diag with mid LAD and distal LAD are small caliber vessels.    LCX small caliber tortuous in the prox segment with minimal luminal irreg, gives off 2.0 mm OM,mid  "LCS is normal, AV groove distal LCX is small   RCA large caliber vessel, culprit with a calcified focal stenosis of 99%, mid RCA is tortuous and calicified without flow limiting stenosis, distal RCA is a large caliber vessel with a large PDA and VERÓNICA branches which are normal.    Cath Report Comments LCX stented with 4.5 X 12 mm Synergy BO.   Living and Work Status    Living Arrangements and Social Status house   Support System Live with an adult  (Children check in on her daily. 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    Preventative Medications   CMS recommended medications Ace inhibitors;Beta Blocker;Lipid Lowering;Influenza vaccination;Pneumonia vaccination;Antiplatelets   Fall Risk Screen   Fall screen completed by Cardiac Rehab   Have you fallen 2 or more times in the past year? No   Have you fallen and had an injury in the past year? No   Timed Up and Go score (seconds) NA   Is patient a fall risk? No   Fall screen comments Patient has not fallen in the recent past and is not a falls risk at this time.    Abuse Screen (yes response referral indicated)   Feels Unsafe at Home or Work/School no   Feels Threatened by Someone no   Does Anyone Try to Keep You From Having Contact with Others or Doing Things Outside Your Home? no   Physical Signs of Abuse Present no   Pain   Patient Currently in Pain Denies   Physical Assessments   Incisions WNL   Edema Not assessed   Right Lung Sounds not assessed   Left Lung Sounds not assessed   Limitations Arthritis   Comments 7/7: Patient has some arthritis, but does not seem to let it stop her from doing her ADL's.    Individualized Treatment Plan   Monitored Sessions Scheduled 24   Monitored Sessions Attended 10   Oxygen   Supplemental Oxygen needed No   Nutrition Management - Weight Management   Assessment Re-assessment   Age 86   Weight 76.2 kg (168 lb)   Height 1.6 m (5' 3\")   BMI (Calculated) 29.76   Goal Weight 68 kg (150 lb)  (long " term goal)   Initial Rate Your Plate Score. Dietary tool to assess eating patterns. Scores range from 24 to 72. The higher the score the healthier the eating pattern. 63   Weight Management Comments 7/7: Patient states she has gained some weight recently as she has been sheltering at home during Covid-19 pandemic.    Nutrition Management - Lipids   Lipids Labs Not Available   Prescribed Lipid Medication Yes   Statin Intensity High Intensity   Lipid Comments 7/7: Patient takes her medications as prescribed.    Nutrition Management - Diabetes   Diabetes No   Nutrition Management Summary   Dietary Recommendations Low Fat;Low Cholesterol;Low Sodium   Stages of Change for Diet Compliance Action   Interventions Planned Educate on Weight Management Principles   Nutrition Summary Comments Patient states she needs to stop baking so many sweets.    Nutrition Target Outcome Weight loss .5-1 lb/week (if BMI > 25)   Psychosocial Management   Psychosocial Assessment Re-assessment   Is there history of clinical depression or increased risk of depression? No previous history   Current Level of Stress per Patient Report Denies   Current Coping Skills Uses Stress Management/Relaxation Techniques   Initial Patient Health Questionnaire -9 Score (PHQ-9) for depression. 5-9 Minimal symptoms, 10-14 Minor depression, 15-19 Major depression, moderately severe, > 20 Major depression, severe  1   Initial [facility] Survey score.  Quality of Life:   If total score > 25 review individual areas where patient rated a 4 or 5.  Consider patients current medical condition and what role that plays on the score.   Adjust treatment protocol to improve areas of concern.  Consider the following:  PHQ9 score, DASI, and re-assessment within the next 30 days to assist with developing treatments.  17   Stages of Change Preparation   Interventions Planned Patient denies need for intervention at this time.   Psychosocial Comments 7/7: Pt does not think  they need further assessment with psychosocial assessments.   Psychosocial Target Outcome Maximize coping skills   Other Core Components - Hypertension   History of or Diagnosis of Hypertension Yes   Currently taking Anti-Hypertensives Yes;Beta blocker;CCB   Hypertension Comments 7/7: Patient takes her medications as prescribed.    Other Core Components - Tobacco   History of Tobacco Use Never   Other Core Components Summary   Interventions Planned Instruct patient on the DASH diet   Patient Goals No   Other Core Components Comments 7/7: Pt is taking all medications as prescribed by MD.    Other Core Components Target Outcome BP < 140/90 or < 130/80 with DM or CKD   Activity/Exercise History   Activity/Exercise Assessment Re-assessment   Activity/Exercise Status prior to event? Was Physically Active;Participated in an Exercise Program   Number of Days Currently participating in Moderate Physical Activity? 7   Number of Days Currently performing  Aerobic Exercise (including rehab)? 7   Number of Minutes per Session Currently of Aerobic Exercise (average)? 30   Current Stage of Change (Physical Activity) Action   Current Stage of Change (Aerobic Exercise) Preparation   Patient Goals Goal #1   Goal #1 Description Patient would like to lose .5 to 1 lb per week by increasing her exercise weekly and decreasing the number of sweets she eats at home.    Goal #1 Target Date 08/12/20   Activity/Exercise Comments 7/7: Pt has been attending exercise sessions 3x weekly for 30+ minutes of aerobic exercise.   Activity/Exercise Target Outcome An Accumulation of 150  Minutes of Aerobic Activity per Week   Exercise Assessment   6 Minute Walk Predicted - Gender Selection Female   6 Minute Walk Predicted (Male) 1102.84   6 Minute Walk Predicted (Female) 1091.22   Initial 6 Minute Walk Distance (Feet) 980 ft   Resting HR 65 bpm   Exercise HR 93 bpm   Post Exercise HR 70 bpm   Resting /70   Exercise /70   Post Exercise BP  128/70   Effort Rating 4-5   Current MET Level 3.3   MET Level Goal 3-4   ECG Rhythm Sinus arrhythmia   Ectopy None   Current Symptoms Dyspnea   Limitations/Restrictions None   Exercise Prescription   Mode Nustep;Recumbant bike;Arm Ergometer;Weights   Duration/Time 15-30 min   Frequency 3 daysweek   THR (85% of age predicted max HR) 113.9   OMNI Effort Rating (0-10 Scale) 4-6/10   Progression Intermittent bouts;Total exercise time of 20-30 minutes;Aerobic exercise to OMNI rating of 5-7, and heart rate at or below target   Comments 7/7: Pt will be avoiding the treadmill during rehab sessions but will continue to exercise on other aerobic machines as tolerated.   Recommended Home Exercise   Type of Exercise Walking   Frequency (days per week) 7   Duration (minutes per session) 15-30 min   Effort Rating Recommended 4-6/10   30 Day Exercise Plan 7/7: Staff will continue to monitor and assess changes during rehab sessions from BP to exercise tolerance.   Current Home Exercise   Type of Exercise Walking   Frequency (days per week) 7   Duration (minutes per session)   (Patient is walking 8 blocks per day at this time. )   Follow-up/On-going Support   Provider follow-up needed on the following No follow-up needed   Learning Assessment   Learner Patient   Primary Language English   Preferred Learning Style Reading   Barriers to Learning No barriers noted   Patient Education   Education recommended Anatomy and Physiology of the Heart;Blood Pressure;Exercise Principles;Medication Overview;Muscle Conditioning;Nutrition;Stress Management   Education Comments Education classes have not been offered recently due to Covid-19. Patient will be given one on one education as needed.    Physician cosignature/electronic signature indicates approval of this ITP document. I have established, reviewed and made necessary changes to the individualized treatment plan and exercise prescription for this patient.

## 2020-07-10 ENCOUNTER — HOSPITAL ENCOUNTER (OUTPATIENT)
Dept: CARDIAC REHAB | Facility: HOSPITAL | Age: 85
Setting detail: THERAPIES SERIES
End: 2020-07-10
Attending: FAMILY MEDICINE
Payer: MEDICARE

## 2020-07-10 PROCEDURE — 93798 PHYS/QHP OP CAR RHAB W/ECG: CPT

## 2020-07-10 PROCEDURE — 40000116 ZZH STATISTIC OP CR VISIT

## 2020-07-13 ENCOUNTER — HOSPITAL ENCOUNTER (OUTPATIENT)
Dept: CARDIAC REHAB | Facility: HOSPITAL | Age: 85
Setting detail: THERAPIES SERIES
End: 2020-07-13
Attending: FAMILY MEDICINE
Payer: MEDICARE

## 2020-07-13 PROCEDURE — 40000116 ZZH STATISTIC OP CR VISIT

## 2020-07-13 PROCEDURE — 93798 PHYS/QHP OP CAR RHAB W/ECG: CPT

## 2020-07-17 ENCOUNTER — HOSPITAL ENCOUNTER (OUTPATIENT)
Dept: CARDIAC REHAB | Facility: HOSPITAL | Age: 85
Setting detail: THERAPIES SERIES
End: 2020-07-17
Attending: FAMILY MEDICINE
Payer: MEDICARE

## 2020-07-17 PROCEDURE — 40000116 ZZH STATISTIC OP CR VISIT

## 2020-07-17 PROCEDURE — 93798 PHYS/QHP OP CAR RHAB W/ECG: CPT

## 2020-07-20 ENCOUNTER — HOSPITAL ENCOUNTER (OUTPATIENT)
Dept: CARDIAC REHAB | Facility: HOSPITAL | Age: 85
Setting detail: THERAPIES SERIES
End: 2020-07-20
Attending: FAMILY MEDICINE
Payer: MEDICARE

## 2020-07-20 PROCEDURE — 40000116 ZZH STATISTIC OP CR VISIT

## 2020-07-20 PROCEDURE — 93798 PHYS/QHP OP CAR RHAB W/ECG: CPT

## 2020-07-22 ENCOUNTER — HOSPITAL ENCOUNTER (OUTPATIENT)
Dept: CARDIAC REHAB | Facility: HOSPITAL | Age: 85
Setting detail: THERAPIES SERIES
End: 2020-07-22
Attending: FAMILY MEDICINE
Payer: MEDICARE

## 2020-07-22 PROCEDURE — 93798 PHYS/QHP OP CAR RHAB W/ECG: CPT

## 2020-07-22 PROCEDURE — 40000116 ZZH STATISTIC OP CR VISIT

## 2020-07-24 ENCOUNTER — HOSPITAL ENCOUNTER (OUTPATIENT)
Dept: CARDIAC REHAB | Facility: HOSPITAL | Age: 85
Setting detail: THERAPIES SERIES
End: 2020-07-24
Attending: FAMILY MEDICINE
Payer: MEDICARE

## 2020-07-24 PROCEDURE — 40000116 ZZH STATISTIC OP CR VISIT

## 2020-07-24 PROCEDURE — 93798 PHYS/QHP OP CAR RHAB W/ECG: CPT

## 2020-07-27 ENCOUNTER — HOSPITAL ENCOUNTER (OUTPATIENT)
Dept: CARDIAC REHAB | Facility: HOSPITAL | Age: 85
Setting detail: THERAPIES SERIES
End: 2020-07-27
Attending: FAMILY MEDICINE
Payer: MEDICARE

## 2020-07-27 PROCEDURE — 93798 PHYS/QHP OP CAR RHAB W/ECG: CPT

## 2020-07-27 PROCEDURE — 40000116 ZZH STATISTIC OP CR VISIT

## 2020-07-29 ENCOUNTER — HOSPITAL ENCOUNTER (OUTPATIENT)
Dept: CARDIAC REHAB | Facility: HOSPITAL | Age: 85
Setting detail: THERAPIES SERIES
End: 2020-07-29
Attending: FAMILY MEDICINE
Payer: MEDICARE

## 2020-07-29 PROCEDURE — 93798 PHYS/QHP OP CAR RHAB W/ECG: CPT

## 2020-07-29 PROCEDURE — 40000116 ZZH STATISTIC OP CR VISIT

## 2020-07-31 ENCOUNTER — HOSPITAL ENCOUNTER (OUTPATIENT)
Dept: CARDIAC REHAB | Facility: HOSPITAL | Age: 85
Setting detail: THERAPIES SERIES
End: 2020-07-31
Attending: FAMILY MEDICINE
Payer: MEDICARE

## 2020-07-31 VITALS — WEIGHT: 166 LBS | HEIGHT: 63 IN | BODY MASS INDEX: 29.41 KG/M2

## 2020-07-31 PROCEDURE — 40000116 ZZH STATISTIC OP CR VISIT

## 2020-07-31 PROCEDURE — 93798 PHYS/QHP OP CAR RHAB W/ECG: CPT

## 2020-07-31 ASSESSMENT — 6 MINUTE WALK TEST (6MWT)
PREDICTED: 1114.84
TOTAL DISTANCE WALKED (FT): 1000
MALE CALC: 1108.09
TOTAL DISTANCE WALKED (FT): 980
GENDER SELECTION: FEMALE
FEMALE CALC: 1098.05

## 2020-07-31 ASSESSMENT — MIFFLIN-ST. JEOR: SCORE: 1162.1

## 2020-07-31 NOTE — PROGRESS NOTES
07/31/20 1300   Session   Session Discharge Note   Certified through this date 08/10/20   Cardiac Rehab Assessment   Cardiac Rehab Assessment 7/31: Pt completes 19 sessions of phase II rehab and 0 educational sessions due to COVID-19 pandemic. Overall, pt progressed very well from pre to post evaluation. Pt's 6 MWT improved by 20 ft with pt stating she could have walked faster, but forgot her back brace at home. MET level increased by 2.8 METs as well as increasing her aerobic exercise time. Pt's [facility] COOP survey improved and PHQ-9 remained the same. By attending phase II rehab 3 days/wk pt reached her exercise goal of losing 0.5-1lb per week by aerobically exercising. Pt lost 7 lbs, estimating the loss of 1 lb/wk, and watched her portion sizes when eating sweets. Pt states she will continue to walk about 12 blocks daily with her daughter. Pt was masked and practiced social distancing during all monitored exercise sessions. Staff wore proper PPE and sanitized machines between all pt use.    Pt made significant gains in exercise tolerance. Initially patient tolerated 33 minutes at 3 METs, now tolerating 38 minutes at 5.8 METs. Patient also increased 6-minute walk test by 2% (an increase of 20 feet.) The Pt was given instructions on frequency (5-7 days/wk), intensity (4-6/10 OMNI Effort Scale), and duration (15-30 minutes) for continued exercise as well as muscle conditioning and stretching exercises.  Your Pt plans to continue being physically active around the house doing chores as well as maintaining a 1 acre garden. Pt will continue to walk roughly 12 blocks every morning with her daughter, weather permitted, and will increase distance as tolerated.    General Information   Treatment Diagnosis NSTEMI   Date of Treatment Diagnosis 03/15/20   Secondary Treatment Diagnosis Stent   Significant Past CV History None   Comorbidities None   Other Medical History TIA, arthritis, hip replacement, knee replacement  and rotator cuff surgeries, gall bladder surgery, appendectomy   Lead up symptoms Patient experienced pain in their shoulder that radiated to their fingers; tingling sensation experienced in fingertips.   Hospital Location [facility] Discharge Date 03/17/20   Signs and Symptoms Post Hospital Discharge SOB   Outpatient Cardiac Rehab Start Date 06/12/20   Primary Physician Chandni   Primary Physician Follow Up Completed   Surgeon Brennon   Surgeon Follow Up Completed   Cardiologist Wander   Cardiologist Follow Up Completed   Risk Stratification Moderate   Summary of Cath Report   Summary of Cath Report Available   Date Performed 03/15/20   Left Main Calcified with preserved lumen bifurcates into LAD and LCX.   LAD Calcified with minimal luminal irregularities gives off large diag with mid LAD and distal LAD are small caliber vessels.    LCX small caliber tortuous in the prox segment with minimal luminal irreg, gives off 2.0 mm OM,mid LCS is normal, AV groove distal LCX is small   RCA large caliber vessel, culprit with a calcified focal stenosis of 99%, mid RCA is tortuous and calicified without flow limiting stenosis, distal RCA is a large caliber vessel with a large PDA and VERÓNICA branches which are normal.    Cath Report Comments LCX stented with 4.5 X 12 mm Synergy BO.   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    Preventative Medications   CMS recommended medications Ace inhibitors;Beta Blocker;Lipid Lowering;Influenza vaccination;Pneumonia vaccination;Antiplatelets   Fall Risk Screen   Fall screen completed by Cardiac Rehab   Have you fallen 2 or more times in the past year? No   Have you fallen and had an injury in the past year? No   Timed Up and Go score (seconds) NA   Is patient a fall risk? No  "  Fall screen comments 7/31: Pt is discharged from phase II rehab without any falls.   Abuse Screen (yes response referral indicated)   Feels Unsafe at Home or Work/School no   Feels Threatened by Someone no   Does Anyone Try to Keep You From Having Contact with Others or Doing Things Outside Your Home? no   Physical Signs of Abuse Present no   Pain   Patient Currently in Pain Denies   Physical Assessments   Incisions WNL   Edema Not assessed   Right Lung Sounds not assessed   Left Lung Sounds not assessed   Limitations Arthritis   Comments 7/31: Patient has some arthritis and lower back problems, but does not seem to let it stop her from doing her ADL's.    Individualized Treatment Plan   Monitored Sessions Scheduled 24   Monitored Sessions Attended 19   Oxygen   Supplemental Oxygen needed No   Oxygen Usage Comment 7/31: Pt does not use supplemantal oxygen.   Nutrition Management - Weight Management   Assessment Discharge   Age 86   Weight 75.3 kg (166 lb)   Height 1.6 m (5' 3\")   BMI (Calculated) 29.41   Goal Weight 68 kg (150 lb)   Initial Rate Your Plate Score. Dietary tool to assess eating patterns. Scores range from 24 to 72. The higher the score the healthier the eating pattern. 63   Discharge Rate Your Plate Score 61   Weight Management Comments 7/31: Pt has lost about 1 lb/wk, losing a total of 7 lbs since initial evaluation.   Nutrition Management - Lipids   Lipids Labs Not Available   Prescribed Lipid Medication Yes   Statin Intensity High Intensity   Lipid Comments 7/31: Pt continues to take her medications as prescribed.   Nutrition Management - Diabetes   Diabetes No   Nutrition Management Summary   Dietary Recommendations Low Fat;Low Cholesterol;Low Sodium   Stages of Change for Diet Compliance Action   Interventions Planned Educate on Weight Management Principles   Interventions In Progress or Completed Understands Weight Management Principles   Nutrition Summary Comments 7/31: Pt continued to bake " sweets, but managed her portion sizes and shared with family and friends.   Nutrition Target Outcome Weight loss .5-1 lb/week (if BMI > 25)   Psychosocial Management   Psychosocial Assessment Discharge   Is there history of clinical depression or increased risk of depression? No previous history   Current Level of Stress per Patient Report Denies   Current Coping Skills Uses Stress Management/Relaxation Techniques   Initial Patient Health Questionnaire -9 Score (PHQ-9) for depression. 5-9 Minimal symptoms, 10-14 Minor depression, 15-19 Major depression, moderately severe, > 20 Major depression, severe  1   Reassessment PHQ-9 Score for Depression 1   [facility] score.  Quality of Life:   If total score > 25 review individual areas where patient rated a 4 or 5.  Consider patients current medical condition and what role that plays on the score.   Adjust treatment protocol to improve areas of concern.  Consider the following:  PHQ9 score, DASI, and re-assessment within the next 30 days to assist with developing treatments.  17   Reassessment [facility] COOP Survey Score 15   Stages of Change Preparation   Interventions Planned Patient denies need for intervention at this time.   Psychosocial Comments 7/31: Pt states she has lots of support from her family. Some of her children live next door and check in on her daily. Pt does not think they need further assessment with psychosocial assessments.   Psychosocial Target Outcome Maximize coping skills   Other Core Components - Hypertension   History of or Diagnosis of Hypertension Yes   Currently taking Anti-Hypertensives Yes;Beta blocker;CCB   Hypertension Comments 7/31: Pt continues to take medications as precribed.   Other Core Components - Tobacco   History of Tobacco Use Never   Other Core Components Summary   Interventions Planned Instruct patient on the DASH diet   Interventions In Progress or Completed Educated on importance of monitoring daily  weight   Patient Goals No   Other Core Components Comments 7/31: Pt continues to take medications as prescribed by MD.   Other Core Components Target Outcome BP < 140/90 or < 130/80 with DM or CKD   Activity/Exercise History   Activity/Exercise Assessment Discharge   Activity/Exercise Status prior to event? Was Physically Active;Participated in an Exercise Program   Number of Days Currently participating in Moderate Physical Activity? 7   Number of Days Currently performing  Aerobic Exercise (including rehab)? 7   Number of Minutes per Session Currently of Aerobic Exercise (average)? 38   Current Stage of Change (Physical Activity) Action   Current Stage of Change (Aerobic Exercise) Action   Patient Goals Goal #1   Goal #1 Description Patient would like to lose .5 to 1 lb per week by increasing her exercise weekly and decreasing the number of sweets she eats at home.    Goal #1 Target Date 08/12/20   Goal #1 Date Met 07/31/20   Goal #1 Progress Towards Goal 7/31: Pt has lost roughly 1 lb/wk totaling 7 lbs from initial evaluation.   Activity/Exercise Comments 7/31: Pt completes 19 sessions of phase II rehab reaching a MET level of 5.8.   Activity/Exercise Target Outcome An Accumulation of 150  Minutes of Aerobic Activity per Week   Exercise Assessment   6 Minute Walk Predicted - Gender Selection Female   6 Minute Walk Predicted (Male) 1108.09   6 Minute Walk Predicted (Female) 1098.05   Initial 6 Minute Walk Distance (Feet) 980 ft   Discharge 6 Minute Walk Distance (Feet) 1000   Resting HR 60 bpm   Exercise HR 88 bpm   Post Exercise HR 66 bpm   Resting /74   Exercise /70   Post Exercise /78   Pre SpO2 96   While Exercising SpO2 98   Post SpO2 98   Effort Rating 5-6   Current MET Level 5.8   MET Level Goal 3-4   ECG Rhythm Sinus arrhythmia   Ectopy PVCs;PACs   Current Symptoms Dyspnea   Limitations/Restrictions None   Exercise Prescription   Mode Nustep;Recumbant bike;Arm Ergometer;Weights  (Scifit)    Duration/Time 15-30 min   Frequency 3 daysweek   THR (85% of age predicted max HR) 113.9   OMNI Effort Rating (0-10 Scale) 4-6/10   Progression Intermittent bouts;Total exercise time of 20-30 minutes;Aerobic exercise to OMNI rating of 5-7, and heart rate at or below target   Comments 7/31: Pt completes 19 sessions of phase II aerobically exercising for a maximum time of 38 minutes and a MET level of 5.8. Pt has worked hard and will continue to exercise daily at home.   Recommended Home Exercise   Type of Exercise Walking   Frequency (days per week) 7   Duration (minutes per session) 15-30 min   Effort Rating Recommended 4-6/10   30 Day Exercise Plan 7/31: Pt is encouraged to continue her daily morning walks as well as staying physically active around the house. Pt is encouraged to keep OMNI Effort Scale in mind when doign anything strenuous on her body.    Current Home Exercise   Type of Exercise Walking   Frequency (days per week) 7   Duration (minutes per session) 20-30  (Pt walks roughly 12 blocks every morning with her daughter.)   Follow-up/On-going Support   Provider follow-up needed on the following No follow-up needed   Learning Assessment   Learner Patient   Primary Language English   Preferred Learning Style Reading   Barriers to Learning No barriers noted   Patient Education   Education recommended Anatomy and Physiology of the Heart;Blood Pressure;Exercise Principles;Medication Overview;Muscle Conditioning;Nutrition;Stress Management   Education Comments 7/31: Education classes have not been offered recently due to Covid-19. Patient will be given one on one education as needed.    Physician cosignature/electronic signature indicates approval of this ITP document. I have established, reviewed and made necessary changes to the individualized treatment plan and exercise prescription for this patient.

## 2020-12-20 ENCOUNTER — HOSPITAL ENCOUNTER (OUTPATIENT)
Facility: CLINIC | Age: 85
Setting detail: OBSERVATION
Discharge: HOME | End: 2020-12-22
Attending: INTERNAL MEDICINE | Admitting: INTERNAL MEDICINE
Payer: MEDICARE

## 2020-12-20 ENCOUNTER — HOSPITAL ENCOUNTER (EMERGENCY)
Facility: HOSPITAL | Age: 85
Discharge: ANOTHER HOSPITAL | End: 2020-12-20
Attending: FAMILY MEDICINE | Admitting: FAMILY MEDICINE
Payer: MEDICARE

## 2020-12-20 ENCOUNTER — APPOINTMENT (OUTPATIENT)
Dept: CT IMAGING | Facility: HOSPITAL | Age: 85
End: 2020-12-20
Attending: FAMILY MEDICINE
Payer: MEDICARE

## 2020-12-20 ENCOUNTER — APPOINTMENT (OUTPATIENT)
Dept: GENERAL RADIOLOGY | Facility: HOSPITAL | Age: 85
End: 2020-12-20
Attending: FAMILY MEDICINE
Payer: MEDICARE

## 2020-12-20 VITALS
OXYGEN SATURATION: 95 % | DIASTOLIC BLOOD PRESSURE: 77 MMHG | SYSTOLIC BLOOD PRESSURE: 129 MMHG | HEIGHT: 62 IN | WEIGHT: 160 LBS | RESPIRATION RATE: 22 BRPM | TEMPERATURE: 98.4 F | HEART RATE: 65 BPM | BODY MASS INDEX: 29.44 KG/M2

## 2020-12-20 DIAGNOSIS — R09.02 HYPOXIA: ICD-10-CM

## 2020-12-20 DIAGNOSIS — I20.0 UNSTABLE ANGINA PECTORIS (H): ICD-10-CM

## 2020-12-20 DIAGNOSIS — I20.0 UNSTABLE ANGINA PECTORIS (H): Primary | ICD-10-CM

## 2020-12-20 DIAGNOSIS — R07.9 CHEST PAIN, UNSPECIFIED TYPE: ICD-10-CM

## 2020-12-20 LAB
ALBUMIN SERPL-MCNC: 3.5 G/DL (ref 3.4–5)
ALBUMIN UR-MCNC: NEGATIVE MG/DL
ALP SERPL-CCNC: 119 U/L (ref 40–150)
ALT SERPL W P-5'-P-CCNC: 29 U/L (ref 0–50)
ANION GAP SERPL CALCULATED.3IONS-SCNC: 6 MMOL/L (ref 3–14)
APPEARANCE UR: CLEAR
AST SERPL W P-5'-P-CCNC: 26 U/L (ref 0–45)
BASOPHILS # BLD AUTO: 0 10E9/L (ref 0–0.2)
BASOPHILS NFR BLD AUTO: 0.4 %
BILIRUB SERPL-MCNC: 0.5 MG/DL (ref 0.2–1.3)
BILIRUB UR QL STRIP: NEGATIVE
BUN SERPL-MCNC: 21 MG/DL (ref 7–30)
CALCIUM SERPL-MCNC: 10.1 MG/DL (ref 8.5–10.1)
CHLORIDE SERPL-SCNC: 107 MMOL/L (ref 94–109)
CO2 SERPL-SCNC: 26 MMOL/L (ref 20–32)
COLOR UR AUTO: NORMAL
CREAT SERPL-MCNC: 0.79 MG/DL (ref 0.52–1.04)
D DIMER PPP FEU-MCNC: 1.1 UG/ML FEU (ref 0–0.5)
DIFFERENTIAL METHOD BLD: NORMAL
EOSINOPHIL # BLD AUTO: 0.1 10E9/L (ref 0–0.7)
EOSINOPHIL NFR BLD AUTO: 1.5 %
ERYTHROCYTE [DISTWIDTH] IN BLOOD BY AUTOMATED COUNT: 13.4 % (ref 10–15)
FLUAV+FLUBV RNA SPEC QL NAA+PROBE: NEGATIVE
FLUAV+FLUBV RNA SPEC QL NAA+PROBE: NEGATIVE
GFR SERPL CREATININE-BSD FRML MDRD: 67 ML/MIN/{1.73_M2}
GLUCOSE SERPL-MCNC: 112 MG/DL (ref 70–99)
GLUCOSE UR STRIP-MCNC: NEGATIVE MG/DL
HCT VFR BLD AUTO: 36.1 % (ref 35–47)
HGB BLD-MCNC: 12 G/DL (ref 11.7–15.7)
HGB UR QL STRIP: NEGATIVE
IMM GRANULOCYTES # BLD: 0 10E9/L (ref 0–0.4)
IMM GRANULOCYTES NFR BLD: 0.3 %
KETONES UR STRIP-MCNC: NEGATIVE MG/DL
LABORATORY COMMENT REPORT: NORMAL
LEUKOCYTE ESTERASE UR QL STRIP: NEGATIVE
LYMPHOCYTES # BLD AUTO: 2.5 10E9/L (ref 0.8–5.3)
LYMPHOCYTES NFR BLD AUTO: 36.1 %
MAGNESIUM SERPL-MCNC: 2 MG/DL (ref 1.6–2.3)
MCH RBC QN AUTO: 28.4 PG (ref 26.5–33)
MCHC RBC AUTO-ENTMCNC: 33.2 G/DL (ref 31.5–36.5)
MCV RBC AUTO: 86 FL (ref 78–100)
MONOCYTES # BLD AUTO: 1.3 10E9/L (ref 0–1.3)
MONOCYTES NFR BLD AUTO: 18.3 %
NEUTROPHILS # BLD AUTO: 3 10E9/L (ref 1.6–8.3)
NEUTROPHILS NFR BLD AUTO: 43.4 %
NITRATE UR QL: NEGATIVE
NRBC # BLD AUTO: 0 10*3/UL
NRBC BLD AUTO-RTO: 0 /100
NT-PROBNP SERPL-MCNC: 459 PG/ML (ref 0–1800)
PH UR STRIP: 6 PH (ref 4.7–8)
PLATELET # BLD AUTO: 214 10E9/L (ref 150–450)
POTASSIUM SERPL-SCNC: 3.8 MMOL/L (ref 3.4–5.3)
POTASSIUM SERPL-SCNC: 4 MMOL/L (ref 3.4–5.3)
PROT SERPL-MCNC: 7.2 G/DL (ref 6.8–8.8)
RBC # BLD AUTO: 4.22 10E12/L (ref 3.8–5.2)
RSV RNA SPEC QL NAA+PROBE: NEGATIVE
SARS-COV-2 RNA SPEC QL NAA+PROBE: NEGATIVE
SODIUM SERPL-SCNC: 139 MMOL/L (ref 133–144)
SOURCE: NORMAL
SP GR UR STRIP: 1.01 (ref 1–1.03)
SPECIMEN SOURCE: NORMAL
TROPONIN I SERPL-MCNC: 0.02 UG/L (ref 0–0.04)
TROPONIN I SERPL-MCNC: 0.02 UG/L (ref 0–0.04)
TROPONIN I SERPL-MCNC: 0.03 UG/L (ref 0–0.04)
TROPONIN I SERPL-MCNC: 0.03 UG/L (ref 0–0.04)
TSH SERPL DL<=0.005 MIU/L-ACNC: 1.6 MU/L (ref 0.4–4)
UROBILINOGEN UR STRIP-MCNC: NORMAL MG/DL (ref 0–2)
WBC # BLD AUTO: 6.8 10E9/L (ref 4–11)

## 2020-12-20 PROCEDURE — 99225 PR SUBSEQUENT OBSERVATION CARE,LEVEL II: CPT | Performed by: NURSE PRACTITIONER

## 2020-12-20 PROCEDURE — 93005 ELECTROCARDIOGRAM TRACING: CPT | Mod: 76

## 2020-12-20 PROCEDURE — 71045 X-RAY EXAM CHEST 1 VIEW: CPT

## 2020-12-20 PROCEDURE — 250N000011 HC RX IP 250 OP 636: Performed by: FAMILY MEDICINE

## 2020-12-20 PROCEDURE — 99207 PR CDG-CODE CATEGORY CHANGED: CPT | Performed by: NURSE PRACTITIONER

## 2020-12-20 PROCEDURE — 87636 SARSCOV2 & INF A&B AMP PRB: CPT | Performed by: EMERGENCY MEDICINE

## 2020-12-20 PROCEDURE — 250N000013 HC RX MED GY IP 250 OP 250 PS 637: Mod: GY | Performed by: INTERNAL MEDICINE

## 2020-12-20 PROCEDURE — 250N000013 HC RX MED GY IP 250 OP 250 PS 637: Performed by: PHYSICIAN ASSISTANT

## 2020-12-20 PROCEDURE — 80053 COMPREHEN METABOLIC PANEL: CPT | Performed by: FAMILY MEDICINE

## 2020-12-20 PROCEDURE — 99220 PR INITIAL OBSERVATION CARE,LEVEL III: CPT | Performed by: PHYSICIAN ASSISTANT

## 2020-12-20 PROCEDURE — 93010 ELECTROCARDIOGRAM REPORT: CPT | Performed by: INTERNAL MEDICINE

## 2020-12-20 PROCEDURE — 85025 COMPLETE CBC W/AUTO DIFF WBC: CPT | Performed by: FAMILY MEDICINE

## 2020-12-20 PROCEDURE — 250N000013 HC RX MED GY IP 250 OP 250 PS 637: Performed by: EMERGENCY MEDICINE

## 2020-12-20 PROCEDURE — 96365 THER/PROPH/DIAG IV INF INIT: CPT

## 2020-12-20 PROCEDURE — 85379 FIBRIN DEGRADATION QUANT: CPT | Performed by: FAMILY MEDICINE

## 2020-12-20 PROCEDURE — 84443 ASSAY THYROID STIM HORMONE: CPT | Performed by: PHYSICIAN ASSISTANT

## 2020-12-20 PROCEDURE — 84484 ASSAY OF TROPONIN QUANT: CPT | Mod: 91 | Performed by: FAMILY MEDICINE

## 2020-12-20 PROCEDURE — 99285 EMERGENCY DEPT VISIT HI MDM: CPT | Mod: 25

## 2020-12-20 PROCEDURE — 93005 ELECTROCARDIOGRAM TRACING: CPT

## 2020-12-20 PROCEDURE — 93010 ELECTROCARDIOGRAM REPORT: CPT | Mod: 77 | Performed by: INTERNAL MEDICINE

## 2020-12-20 PROCEDURE — 96366 THER/PROPH/DIAG IV INF ADDON: CPT

## 2020-12-20 PROCEDURE — 83735 ASSAY OF MAGNESIUM: CPT | Performed by: PHYSICIAN ASSISTANT

## 2020-12-20 PROCEDURE — 36415 COLL VENOUS BLD VENIPUNCTURE: CPT | Performed by: FAMILY MEDICINE

## 2020-12-20 PROCEDURE — 81003 URINALYSIS AUTO W/O SCOPE: CPT | Performed by: FAMILY MEDICINE

## 2020-12-20 PROCEDURE — 71275 CT ANGIOGRAPHY CHEST: CPT

## 2020-12-20 PROCEDURE — 84484 ASSAY OF TROPONIN QUANT: CPT | Performed by: PHYSICIAN ASSISTANT

## 2020-12-20 PROCEDURE — C9803 HOPD COVID-19 SPEC COLLECT: HCPCS

## 2020-12-20 PROCEDURE — 83880 ASSAY OF NATRIURETIC PEPTIDE: CPT | Performed by: FAMILY MEDICINE

## 2020-12-20 PROCEDURE — G0378 HOSPITAL OBSERVATION PER HR: HCPCS

## 2020-12-20 PROCEDURE — 250N000013 HC RX MED GY IP 250 OP 250 PS 637: Mod: GY | Performed by: FAMILY MEDICINE

## 2020-12-20 PROCEDURE — 84132 ASSAY OF SERUM POTASSIUM: CPT | Performed by: PHYSICIAN ASSISTANT

## 2020-12-20 PROCEDURE — 99285 EMERGENCY DEPT VISIT HI MDM: CPT | Performed by: EMERGENCY MEDICINE

## 2020-12-20 RX ORDER — SENNOSIDES 8.6 MG
1-2 TABLET ORAL 2 TIMES DAILY PRN
Status: DISCONTINUED | OUTPATIENT
Start: 2020-12-20 | End: 2020-12-22 | Stop reason: HOSPADM

## 2020-12-20 RX ORDER — NALOXONE HYDROCHLORIDE 0.4 MG/ML
0.4 INJECTION, SOLUTION INTRAMUSCULAR; INTRAVENOUS; SUBCUTANEOUS
Status: DISCONTINUED | OUTPATIENT
Start: 2020-12-20 | End: 2020-12-22 | Stop reason: HOSPADM

## 2020-12-20 RX ORDER — METOPROLOL TARTRATE 50 MG
50 TABLET ORAL ONCE
Status: COMPLETED | OUTPATIENT
Start: 2020-12-20 | End: 2020-12-20

## 2020-12-20 RX ORDER — POLYETHYLENE GLYCOL 3350 17 G/17G
17 POWDER, FOR SOLUTION ORAL DAILY
Status: DISCONTINUED | OUTPATIENT
Start: 2020-12-20 | End: 2020-12-22 | Stop reason: HOSPADM

## 2020-12-20 RX ORDER — MAGNESIUM HYDROXIDE/ALUMINUM HYDROXICE/SIMETHICONE 120; 1200; 1200 MG/30ML; MG/30ML; MG/30ML
30 SUSPENSION ORAL EVERY 4 HOURS PRN
Status: DISCONTINUED | OUTPATIENT
Start: 2020-12-20 | End: 2020-12-22 | Stop reason: HOSPADM

## 2020-12-20 RX ORDER — NITROGLYCERIN 0.4 MG/1
0.4 TABLET SUBLINGUAL EVERY 5 MIN PRN
Status: DISCONTINUED | OUTPATIENT
Start: 2020-12-20 | End: 2020-12-22 | Stop reason: HOSPADM

## 2020-12-20 RX ORDER — AMLODIPINE BESYLATE 5 MG/1
5 TABLET ORAL DAILY
Status: DISCONTINUED | OUTPATIENT
Start: 2020-12-21 | End: 2020-12-22 | Stop reason: HOSPADM

## 2020-12-20 RX ORDER — METOPROLOL TARTRATE 50 MG
50 TABLET ORAL 3 TIMES DAILY
Status: DISCONTINUED | OUTPATIENT
Start: 2020-12-20 | End: 2020-12-20

## 2020-12-20 RX ORDER — NALOXONE HYDROCHLORIDE 0.4 MG/ML
0.2 INJECTION, SOLUTION INTRAMUSCULAR; INTRAVENOUS; SUBCUTANEOUS
Status: DISCONTINUED | OUTPATIENT
Start: 2020-12-20 | End: 2020-12-22 | Stop reason: HOSPADM

## 2020-12-20 RX ORDER — CLOPIDOGREL BISULFATE 75 MG/1
75 TABLET ORAL DAILY
Status: DISCONTINUED | OUTPATIENT
Start: 2020-12-21 | End: 2020-12-22 | Stop reason: HOSPADM

## 2020-12-20 RX ORDER — NITROGLYCERIN 0.4 MG/1
1 TABLET SUBLINGUAL EVERY 5 MIN PRN
COMMUNITY
Start: 2020-03-17

## 2020-12-20 RX ORDER — ACETAMINOPHEN 650 MG/1
650 SUPPOSITORY RECTAL EVERY 4 HOURS PRN
Status: DISCONTINUED | OUTPATIENT
Start: 2020-12-20 | End: 2020-12-22 | Stop reason: HOSPADM

## 2020-12-20 RX ORDER — HYDROMORPHONE HYDROCHLORIDE 1 MG/ML
0.2 INJECTION, SOLUTION INTRAMUSCULAR; INTRAVENOUS; SUBCUTANEOUS
Status: DISCONTINUED | OUTPATIENT
Start: 2020-12-20 | End: 2020-12-22 | Stop reason: HOSPADM

## 2020-12-20 RX ORDER — ACETAMINOPHEN 325 MG/1
650 TABLET ORAL ONCE
Status: COMPLETED | OUTPATIENT
Start: 2020-12-20 | End: 2020-12-20

## 2020-12-20 RX ORDER — AMLODIPINE BESYLATE 5 MG/1
5 TABLET ORAL ONCE
Status: COMPLETED | OUTPATIENT
Start: 2020-12-20 | End: 2020-12-20

## 2020-12-20 RX ORDER — ACETAMINOPHEN 325 MG/1
650 TABLET ORAL EVERY 4 HOURS PRN
Status: DISCONTINUED | OUTPATIENT
Start: 2020-12-20 | End: 2020-12-22 | Stop reason: HOSPADM

## 2020-12-20 RX ORDER — MAGNESIUM HYDROXIDE/ALUMINUM HYDROXICE/SIMETHICONE 120; 1200; 1200 MG/30ML; MG/30ML; MG/30ML
30 SUSPENSION ORAL ONCE
Status: COMPLETED | OUTPATIENT
Start: 2020-12-20 | End: 2020-12-20

## 2020-12-20 RX ORDER — LOSARTAN POTASSIUM 25 MG/1
25 TABLET ORAL ONCE
Status: COMPLETED | OUTPATIENT
Start: 2020-12-20 | End: 2020-12-20

## 2020-12-20 RX ORDER — ROSUVASTATIN CALCIUM 40 MG/1
40 TABLET, COATED ORAL DAILY
COMMUNITY

## 2020-12-20 RX ORDER — METOPROLOL TARTRATE 50 MG
50 TABLET ORAL EVERY EVENING
Status: DISCONTINUED | OUTPATIENT
Start: 2020-12-20 | End: 2020-12-22 | Stop reason: HOSPADM

## 2020-12-20 RX ORDER — SWAB
1 SWAB, NON-MEDICATED MISCELLANEOUS DAILY
COMMUNITY

## 2020-12-20 RX ORDER — IOPAMIDOL 755 MG/ML
75 INJECTION, SOLUTION INTRAVASCULAR ONCE
Status: COMPLETED | OUTPATIENT
Start: 2020-12-20 | End: 2020-12-20

## 2020-12-20 RX ORDER — ASPIRIN 81 MG/1
81 TABLET ORAL DAILY
Status: DISCONTINUED | OUTPATIENT
Start: 2020-12-21 | End: 2020-12-22 | Stop reason: HOSPADM

## 2020-12-20 RX ORDER — NITROGLYCERIN 20 MG/100ML
10-200 INJECTION INTRAVENOUS CONTINUOUS
Status: DISCONTINUED | OUTPATIENT
Start: 2020-12-20 | End: 2020-12-20 | Stop reason: HOSPADM

## 2020-12-20 RX ORDER — METOPROLOL TARTRATE 50 MG
100 TABLET ORAL EVERY MORNING
Status: DISCONTINUED | OUTPATIENT
Start: 2020-12-21 | End: 2020-12-22 | Stop reason: HOSPADM

## 2020-12-20 RX ORDER — LIDOCAINE 40 MG/G
CREAM TOPICAL
Status: DISCONTINUED | OUTPATIENT
Start: 2020-12-20 | End: 2020-12-22 | Stop reason: HOSPADM

## 2020-12-20 RX ORDER — LOSARTAN POTASSIUM 25 MG/1
25 TABLET ORAL 2 TIMES DAILY
Status: DISCONTINUED | OUTPATIENT
Start: 2020-12-20 | End: 2020-12-20

## 2020-12-20 RX ADMIN — AMLODIPINE BESYLATE 5 MG: 5 TABLET ORAL at 11:34

## 2020-12-20 RX ADMIN — METOPROLOL TARTRATE 50 MG: 50 TABLET, FILM COATED ORAL at 20:25

## 2020-12-20 RX ADMIN — ACETAMINOPHEN 650 MG: 325 TABLET, FILM COATED ORAL at 04:59

## 2020-12-20 RX ADMIN — IOPAMIDOL 75 ML: 755 INJECTION, SOLUTION INTRAVENOUS at 06:22

## 2020-12-20 RX ADMIN — POLYETHYLENE GLYCOL 3350 17 G: 17 POWDER, FOR SOLUTION ORAL at 20:24

## 2020-12-20 RX ADMIN — ALUMINUM HYDROXIDE, MAGNESIUM HYDROXIDE, AND SIMETHICONE 30 ML: 200; 200; 20 SUSPENSION ORAL at 11:41

## 2020-12-20 RX ADMIN — LOSARTAN POTASSIUM 25 MG: 25 TABLET, FILM COATED ORAL at 11:34

## 2020-12-20 RX ADMIN — NITROGLYCERIN 10 MCG/MIN: 20 INJECTION INTRAVENOUS at 04:03

## 2020-12-20 RX ADMIN — METOPROLOL TARTRATE 50 MG: 50 TABLET, FILM COATED ORAL at 11:34

## 2020-12-20 RX ADMIN — NITROGLYCERIN 0.4 MG: 0.4 TABLET SUBLINGUAL at 19:51

## 2020-12-20 ASSESSMENT — ENCOUNTER SYMPTOMS
PALPITATIONS: 0
HEADACHES: 0
UNEXPECTED WEIGHT CHANGE: 0
NECK STIFFNESS: 0
WHEEZING: 0
CONFUSION: 0
COLOR CHANGE: 0
DIFFICULTY URINATING: 0
ABDOMINAL PAIN: 0
FATIGUE: 0
ARTHRALGIAS: 0
EYE REDNESS: 0

## 2020-12-20 ASSESSMENT — MIFFLIN-ST. JEOR: SCORE: 1114.01

## 2020-12-20 NOTE — ED NOTES
Pt back from imaging. Pain 2 /10 in left shoulder. Frequently burping but denies nausea.  Ambulated to BR. Steady.

## 2020-12-20 NOTE — H&P
[facility]    History and Physical - Hospitalist Service       Date of Admission:  12/20/2020  PRIMARY CARE PROVIDER:    Khadra Tariq    Assessment & Plan   Kary Moody is a 87 year old female admitted on 12/20/2020.    Past medical history significant for CAD s/p stent and recent STEMI 3/2020, HTN, HLP, Hypothyroidism, Constipation, Vit D deficiency, Folate deficiency, B12 deficiency, History of CVA/TIA who was directly admitted under observation status for chest pain and acute hypoxia.      Chest pain  CAD with recent STEMI 3/2020 with arthrectomy and BO to RCA  HTN  HLP  Presented to [facility] ED with left shoulder pain and upper chest pain.  She indicated this was similar to previous STEMI from earlier this year.  Troponin x3 detectable but negative (0.032-->0.021-->0.024).  BNP negative (459).  Patient was started on a nitroglycerin drip due to continued pain and once resolved this was stopped and has been restarted.     --Trend troponin.    --Cardiology consult requested.    --Telemetry.    --Echo.    --Resume PTA ASA 81 mg/d, Plavix 75 mg/d, amlodipine 5 mg/d, metoprolol tartrate 100 mg every morning and 50 mg QHS.    --Hold PTA Crestor due to observation status and resume at discharge.  --PRN Nitroglycerin available.   --REquest outside records from [facility].       Hypoxia, acute:  Resolved  O2 saturation on RA at 88-90%.  She denied fever, cough or SOB.  COVID-19, influenza PCR negative.Chest Xray (portable) negative.  D-Dimer elevated at 1.1.  Chest CTA negative for PEbut with bilateral ground glass opcities; possibly sequela geographic air trapping.  Atypical infectious process is possible.   -Upon arrival to [facility] patient's O2 saturations on room air were at 96%.    --O2 spot checks.    --Supplemental O2 PRN, wean as able.    --IS and flutter valve.      Hypothyroidism:  --Hold PTA Thyorid replacement 75 mg/d.      Constipation:  --PRN bowel regiment  available.      Vit D deficiency  Folate deficiency  B12 deficiency  --Hold PTA replacement and resume at discharge.      History of CVA/TIA:  --Resume PTA ASA, Plavix and antihypertensive medications.       Diet: Combination Diet Low Saturated Fat Na <2400mg Diet, No Caffeine Diet  DVT Prophylaxis: Pneumatic Compression Devices  Hubbard Catheter: not present  Code Status: Full Code       Disposition Plan   Expected discharge: 1-2 days, recommended to prior living arrangement once cardiac work-up completed.  Entered: Michael Sweeney PA-C 12/20/2020, 3:51 PM     The patient's care was discussed with the Patient.    The patient has been discussed with Dr. Shepherd, who agrees with the assessment and plan at this time. Dr. Shepherd will evaluate the patient independently.     Michael Sweeney PA-C  [facility]    ______________________________________________________________________    Chief Complaint   Direct admit due to chest pain and acute hypoxia.    History is obtained from the patient and EMR.      History of Present Illness   Kary Moody is a 87 year old female with a past medical history significant for CAD s/p stent and recent STEMI 3/2020, HTN, HLP, Hypothyroidism, Constipation, Vit D deficiency, Folate deficiency, B12 deficiency, History of CVA/TIA who was directly admitted under observation status for chest pain and acute hypoxia.      Presented to [facility] ED with left shoulder pain and upper chest pain.  She indicated this was similar to previous NSTEMI from earlier this year.  EKG without ischemic changes.  Troponin x3 detectable but negative (0.032-->0.021-->0.024).  BNP negative (459).  CMP unremarkable except glucose of 112.  CBC with differential unremarkable.  UA negative.  Patient was started on a nitroglycerin drip due to continued pain and once resolved this was stopped and has been restarted.      Patient was seen in her hospital room.  She was  resting comfortably in bed.  She currently denies any chest pain, shoulder pain or arm discomfort/numbness.  She indicated that she had previously had chest tightness with left shoulder pain and radiation of pain down her arm and numbness of her left hand/fingers.  This was similar to her STEMI in 3/2020.  She mentioned this improved with the nitroglycerin she received in the ED.      She stated that she has noticed more shortness of breath recently.  She described going on walks with her daughter (6 blocks) and needing to take frequent breaks.  She also feels her legs have been swelling more recently, especially in the last 2 days.  She has had increasing fatigue.  She stated she has cataracts and needs a new prescription for her glasses.  She has had intermittent issues with gas pain and stated she at times feels very bloated.  She has had some mild nausea lately.  She has ongoing issues with constipation.  Patient has arthritic pain and feels she bruises easily.  She occasional becomes lightheaded in the mornings when getting up from bed.      Review of Systems    The 10 point Review of Systems is negative other than noted in the HPI.      Past Medical History    I have reviewed this patient's medical history and updated it with pertinent information if needed.   No past medical history on file.   CAD s/p stent and recent STEMI 3/2020, HTN, HLP, Hypothyroidism, Constipation, Vit D deficiency, Folate deficiency, B12 deficiency, History of CVA/TIA     Past Surgical History   I have reviewed this patient's surgical history and updated it with pertinent information if needed.  No past surgical history on file.   Right nose biopsy, colon polypectomy, total abd hysterectomy with BSO, tonsillectomy, sphincterotomy, lap cholecystectomy, left rotator cuff repair, bilat carpal tunnel release, left ankle fracture repair, right total hip replacement, left knee meniscal repair.    Social History   I have reviewed this  patient's social history and updated it with pertinent information if needed.  Patient resides in a house in [place], [place] with her .  Her children leave nearby.  She has never smoked.  Denied alcohol consumption.  She does not use illicit drugs.  She does not use a cane, walker or CPAP.    Social History     Tobacco Use     Smoking status: Never Smoker     Smokeless tobacco: Never Used   Substance Use Topics     Alcohol use: No     Drug use: No        Family History   I have reviewed this patient's family history and updated it with pertinent information if needed.   No family history on file.   Mother: CHF ().    Brother: CAD with CABG.      Prior to Admission Medications   Prior to Admission Medications   Prescriptions Last Dose Informant Patient Reported? Taking?   Cyanocobalamin 500 MCG TBDP 2020 at Unknown time  Yes Yes   Sig: Take 500 mcg by mouth daily    amLODIPine (NORVASC) 5 MG tablet 2020 at Unknown time  Yes Yes   Sig: Take 5 mg by mouth daily   aspirin 81 MG tablet 2020 at Unknown time  Yes Yes   Sig: Take 81 mg by mouth daily   clopidogrel (PLAVIX) 75 MG tablet 2020 at Unknown time  Yes Yes   Sig: Take 75 mg by mouth daily    folic acid (FOLVITE) 1 MG tablet 2020 at Unknown time  Yes Yes   Sig: Take 1 mg by mouth daily    metoprolol tartrate (LOPRESSOR) 50 MG tablet 2020 at Unknown time  Yes Yes   Sig: Take 100 mg in the morning and 50 mg in the evening   nitroGLYcerin (NITROSTAT) 0.4 MG sublingual tablet 2020 at Unknown time  Yes Yes   Sig: Place 1 tablet under the tongue every 5 minutes as needed for chest pain   polyethylene glycol (MIRALAX/GLYCOLAX) Packet 2020 at Unknown time  Yes Yes   Sig: Take 1 packet by mouth daily   rosuvastatin (CRESTOR) 40 MG tablet 2020 at Unknown time  Yes Yes   Sig: Take 40 mg by mouth daily   thyroid (ARMOUR) 60 MG tablet 2020 at Unknown time  Yes Yes   Sig: Take 60 mg by mouth daily    vitamin  D3 (CHOLECALCIFEROL) 10 MCG (400 UNIT) capsule 12/20/2020 at Unknown time  Yes Yes   Sig: Take 1 capsule by mouth daily      Facility-Administered Medications: None     Allergies   Allergies   Allergen Reactions     Atorvastatin      Celebrex [Celecoxib] Nausea and Vomiting     Codeine Phosphate      Flu Virus Vaccine      Ibuprofen Sodium      Latex Hives     Lisinopril Cough     Penicillins      Welchol [Colesevelam]      Niaspan [Niacin] Rash     Upset Stomach       Physical Exam   Temp: 97.5  F (36.4  C) Temp src: Oral BP: (!) 140/62 Pulse: 70     SpO2: 95 % O2 Device: None (Room air)      Constitutional: Awake, alert, cooperative, no apparent distress.    ENT: Normocephalic, without obvious abnormality, atraumatic, oral pharynx with moist mucus membranes, tonsils without erythema or exudates.  Eyes pupils are equal, round and reactive to light; extra occular movements intact.  Normal sclera.    Neck: Supple, symmetrical, trachea midline, no adenopathy.  Pulmonary: No increased work of breathing, good air exchange, clear to auscultation bilaterally, no crackles or wheezing.  Cardiovascular: Regular rate and rhythm, normal S1 and S2, no S3 or S4, and no murmur noted.  GI: Normal bowel sounds, soft, non-distended, non-tender.    Skin/Integumen: Clear.  Neuro: CN II-XII grossly intact.  Upper and lower extremities strength, coordination and sensation intact bilaterally.    Psych:  Alert and oriented x 3. Normal affect.  Extremities: 1+ pitting lower extremity edema noted bialterally, and calves are non-tender to palpation bilaterally.     Data   Data reviewed today: I reviewed all medications, new labs and imaging results over the last 24 hours. I personally reviewed no images or EKG's today.    Recent Labs   Lab 12/20/20  0810 12/20/20  0508 12/20/20  0155   WBC  --   --  6.8   HGB  --   --  12.0   MCV  --   --  86   PLT  --   --  214   NA  --   --  139   POTASSIUM  --   --  3.8   CHLORIDE  --   --  107   CO2  --    --  26   BUN  --   --  21   CR  --   --  0.79   ANIONGAP  --   --  6   CANDACE  --   --  10.1   GLC  --   --  112*   ALBUMIN  --   --  3.5   PROTTOTAL  --   --  7.2   BILITOTAL  --   --  0.5   ALKPHOS  --   --  119   ALT  --   --  29   AST  --   --  26   TROPI 0.024 0.021 0.032     Recent Results (from the past 24 hour(s))   XR Chest Port 1 View    Narrative    XR CHEST PORT 1 VW    HISTORY: 87 yearsFemale chest pain    TECHNIQUE: A single view of the chest was performed    COMPARISON: 3/15/2020    FINDINGS: Heart size and pulmonary vascularity are within normal  limits. Lungs are clear. No consolidating airspace opacities are  present.        Impression    IMPRESSION: Clear chest    MARTI GEORGE MD   CTA Chest with Contrast    Narrative    CTA CHEST WITH CONTRAST    HISTORY: 87 years Female shortness of breath, elevated D-Dimer    TECHNIQUE: Axial CT imaging of the chest was performed with  intervenous contrast during arterial phase of enhancement. Multiplanar  reconstructions and 3-D MIP reconstructions were obtained on a 3-D  workstation.    COMPARISON: None    FINDINGS:  There is no evidence of pulmonary embolism. There is no evidence of  thoracic aortic aneurysm or dissection.  There is no mediastinal or hilar or axillary lymphadenopathy.    There is patchy geographic groundglass opacity.         There is osteopenia.    A biliary stent is present.      Impression    IMPRESSION: No evidence of pulmonary embolism.    Bilateral groundglass opacities, possibly sequela geographic air  trapping.  Atypical infectious process is possible.    MARTI GEORGE MD

## 2020-12-20 NOTE — DISCHARGE INSTRUCTIONS

## 2020-12-20 NOTE — ED PROVIDER NOTES
History     Chief Complaint   Patient presents with     Chest Pain     started at 2130     HPI  Kary Moody is a 87 year old woman with history of CAD (STEMI of RCI 8/2/18, NSTEMI 3/15/20) who presents reporting the acute onset of left-sided shoulder pain at 2130 associated with mild shortness of breath. She took 2 nitroglycerin which improved both her pain and shortness of breath, but her pain is still present. This pain is similar to what she experienced this past March during her NSTEMI.    No fevers/chills, cough, shortness of breath, loss of taste/smell or nausea/vomiting/diarrhea. No recent known exposure to COVID.      Allergies:  Allergies   Allergen Reactions     Atorvastatin      Celebrex [Celecoxib] Nausea and Vomiting     Codeine Phosphate      Flu Virus Vaccine      Ibuprofen Sodium      Latex Hives     Lisinopril Cough     Penicillins      Welchol [Colesevelam]      Niaspan [Niacin] Rash     Upset Stomach       Problem List:    Patient Active Problem List    Diagnosis Date Noted     Pneumonia 08/02/2018     Priority: Medium     ST elevation myocardial infarction involving right coronary artery (H) 08/02/2018     Priority: Medium     Chest pain 05/28/2016     Priority: Medium        Past Medical History:    History reviewed. No pertinent past medical history.    Past Surgical History:    History reviewed. No pertinent surgical history.    Family History:    History reviewed. No pertinent family history.    Social History:  Marital Status:   [2]  Social History     Tobacco Use     Smoking status: Never Smoker     Smokeless tobacco: Never Used   Substance Use Topics     Alcohol use: No     Drug use: No        Medications:         amLODIPine (NORVASC) 5 MG tablet       aspirin 81 MG tablet       Clopidogrel Bisulfate (PLAVIX PO)       Cyanocobalamin 500 MCG TBDP       FOLIC ACID PO       losartan (COZAAR) 25 MG tablet       METOPROLOL TARTRATE PO       Nitroglycerin (NITROTAB SL)        "polyethylene glycol (MIRALAX/GLYCOLAX) Packet       THYROID PO       vitamin D3 (CHOLECALCIFEROL) 10 MCG (400 UNIT) capsule          Review of Systems   Constitutional: Negative for fatigue and unexpected weight change.   HENT: Negative for congestion.    Eyes: Negative for redness.   Respiratory: Negative for wheezing.    Cardiovascular: Negative for palpitations.   Gastrointestinal: Negative for abdominal pain.   Genitourinary: Negative for difficulty urinating.   Musculoskeletal: Negative for arthralgias and neck stiffness.   Skin: Negative for color change.   Neurological: Negative for headaches.   Psychiatric/Behavioral: Negative for confusion.       Physical Exam   BP: 168/85  Pulse: 73  Temp: 98.4  F (36.9  C)  Resp: 20  Height: 157.5 cm (5' 2\")  Weight: 72.6 kg (160 lb)  SpO2: 98 %      Physical Exam    General Appearance: well-developed, well-nourished, alert & oriented, no apparent distress.    HEENT: atraumatic. Pupils equal, round & reactive to light, extraocular movements intact. Bilateral ear canals clear. Nose without rhinorrhea or epistaxis. Clear oropharynx. Moist mucous membranes.    Neck: normal inspection, non-tender, full & painless ROM, supple, no lymphadenopathy or nuchal rigidity. No jugular venous distension.    Cardiovascular: regular rate, rhythm, normal S1 & S2, no murmurs, rubs or gallops.    Respiratory: clear lung sounds with good air entry, no wheezes rales or rhonchi, no acute respiratory distress.    Gastrointestinal: normal inspection, normal bowel sounds, non-tender, no masses or organomegaly.    Extremities: non-pitting pedal edema to the level of the knees, 2+/4+ pulses bilaterally, normal & painless ROM, non-tender, joints normal.    Neurologic: CN II - XII intact, no motor/sensory deficit.    Skin: normal color, no skin rash.    ED Course     0535  Patient updated regarding her laboratory and imaging evaluation. Plan to consult [facility] where patient was previously " hospitalized for her NSTEMI 3/2020.    0540  [facility] is currently on divert. [facility] is also on divert.    0545  Patient discussed with Dr. Self who recommends continued evaluation with CTA chest, BNP and 3rd troponin.    0810  Patient's care transferred to Dr. Almaguer at shift change at 0810.    ED Course as of Dec 20 1309   Sun Dec 20, 2020   0820 Nt probnp inpatient     Procedures             Results for orders placed or performed during the hospital encounter of 12/20/20 (from the past 24 hour(s))   CBC with platelets differential   Result Value Ref Range    WBC 6.8 4.0 - 11.0 10e9/L    RBC Count 4.22 3.8 - 5.2 10e12/L    Hemoglobin 12.0 11.7 - 15.7 g/dL    Hematocrit 36.1 35.0 - 47.0 %    MCV 86 78 - 100 fl    MCH 28.4 26.5 - 33.0 pg    MCHC 33.2 31.5 - 36.5 g/dL    RDW 13.4 10.0 - 15.0 %    Platelet Count 214 150 - 450 10e9/L    Diff Method Automated Method     % Neutrophils 43.4 %    % Lymphocytes 36.1 %    % Monocytes 18.3 %    % Eosinophils 1.5 %    % Basophils 0.4 %    % Immature Granulocytes 0.3 %    Nucleated RBCs 0 0 /100    Absolute Neutrophil 3.0 1.6 - 8.3 10e9/L    Absolute Lymphocytes 2.5 0.8 - 5.3 10e9/L    Absolute Monocytes 1.3 0.0 - 1.3 10e9/L    Absolute Eosinophils 0.1 0.0 - 0.7 10e9/L    Absolute Basophils 0.0 0.0 - 0.2 10e9/L    Abs Immature Granulocytes 0.0 0 - 0.4 10e9/L    Absolute Nucleated RBC 0.0    Comprehensive metabolic panel   Result Value Ref Range    Sodium 139 133 - 144 mmol/L    Potassium 3.8 3.4 - 5.3 mmol/L    Chloride 107 94 - 109 mmol/L    Carbon Dioxide 26 20 - 32 mmol/L    Anion Gap 6 3 - 14 mmol/L    Glucose 112 (H) 70 - 99 mg/dL    Urea Nitrogen 21 7 - 30 mg/dL    Creatinine 0.79 0.52 - 1.04 mg/dL    GFR Estimate 67 >60 mL/min/[1.73_m2]    GFR Estimate If Black 78 >60 mL/min/[1.73_m2]    Calcium 10.1 8.5 - 10.1 mg/dL    Bilirubin Total 0.5 0.2 - 1.3 mg/dL    Albumin 3.5 3.4 - 5.0 g/dL    Protein Total 7.2 6.8 - 8.8 g/dL    Alkaline Phosphatase 119 40 -  150 U/L    ALT 29 0 - 50 U/L    AST 26 0 - 45 U/L   Troponin I   Result Value Ref Range    Troponin I ES 0.032 0.000 - 0.045 ug/L   D dimer quantitative   Result Value Ref Range    D Dimer 1.1 (H) 0.0 - 0.50 ug/ml FEU   UA reflex to Microscopic and Culture    Specimen: Unspecified Urine   Result Value Ref Range    Color Urine Straw     Appearance Urine Clear     Glucose Urine Negative NEG^Negative mg/dL    Bilirubin Urine Negative NEG^Negative    Ketones Urine Negative NEG^Negative mg/dL    Specific Gravity Urine 1.007 1.003 - 1.035    Blood Urine Negative NEG^Negative    pH Urine 6.0 4.7 - 8.0 pH    Protein Albumin Urine Negative NEG^Negative mg/dL    Urobilinogen mg/dL Normal 0.0 - 2.0 mg/dL    Nitrite Urine Negative NEG^Negative    Leukocyte Esterase Urine Negative NEG^Negative    Source Unspecified Urine    Troponin I   Result Value Ref Range    Troponin I ES 0.021 0.000 - 0.045 ug/L   Nt probnp inpatient   Result Value Ref Range    N-Terminal Pro BNP Inpatient 459 0 - 1,800 pg/mL   Troponin I   Result Value Ref Range    Troponin I ES 0.024 0.000 - 0.045 ug/L   Influenza A/B & SARS-CoV2 (COVID-19) Virus PCR Multiplex    Specimen: Nasopharyngeal   Result Value Ref Range    Flu A/B & SARS-COV-2 PCR Source Nasopharyngeal     SARS-CoV-2 PCR Result NEGATIVE     Influenza A PCR Negative NEG^Negative    Influenza B PCR Negative NEG^Negative    Respiratory Syncytial Virus PCR Negative NEG^Negative    Flu A/B & SARS-CoV-2 PCR Comment       Testing was performed using the Xpert Xpress SARS-CoV2/Flu/RSV Assay on the [employer]   GeneXpert Instrument. Additional information about the Emergency Use Authorization (EUA)   assay can be found via the Lab Guide.         Medications   nitroGLYcerin 50 mg in D5W 250 mL (adult std) infusion CENTRAL (0 mcg/min Intravenous Stopped 12/20/20 8042)   acetaminophen (TYLENOL) tablet 650 mg (650 mg Oral Given 12/20/20 3049)   iopamidol (ISOVUE-370) solution 75 mL (75 mLs Intravenous Given  12/20/20 0622)   sodium chloride (PF) 0.9% PF flush 100 mL (100 mLs Intravenous Given 12/20/20 0621)   amLODIPine (NORVASC) tablet 5 mg (5 mg Oral Given 12/20/20 1134)   metoprolol tartrate (LOPRESSOR) tablet 50 mg (50 mg Oral Given 12/20/20 1134)   losartan (COZAAR) tablet 25 mg (25 mg Oral Given 12/20/20 1134)   alum & mag hydroxide-simethicone (MAALOX) suspension 30 mL (30 mLs Oral Given 12/20/20 1141)       Assessments & Plan (with Medical Decision Making)   The patient is an 87 year old woman with atypical chest pain.    1) Atypical chest pain - no anemia, leukocytosis or acute renal/hepatic abnormality. Normal lactic acid. Negative troponin x 2 and no acute ST changes on EKG x 2. Normal CXR. However, the patient continues to have pain even with nitroglycerin infusion.      Addendum Signout:  Patient is an 87-year-old who was seen overnight for left-sided shoulder and upper back pain reminiscent of previous myocardial infarction.  She has a history of coronary artery disease, STEMI and NSTEMI earlier this year.  She was feeling more comfortable when I rechecked on her this morning.  She reported essentially no ongoing chest discomfort, we were able to titrate down her nitroglycerin infusion with no recurrence of her severe left shoulder pain.  We obtained another set of biomarkers which was once again detectable but within normal limits.  She was noted to be hypoxemic, I did attempt to turn off her oxygen this morning but she desatted into the upper 80s.  Reviewed her CTA which showed mosaic attenuation of both lungs suggestive of small airway disease versus viral  infection.  Reassuringly her multiplex PCR for Covid and influenza was negative so I think this is probably more of a chronic finding.  I do think she will need to be admitted and further evaluated for both her chest pain and also her new hypoxemia.  Her BNP was also within normal limits.  Unfortunately there is no bed availability in either system  in [place], this patient likely would not be a good candidate to be admitted here given the limitations in cardiac evaluation at [facility], and will need to be transferred to the [place] area.  Discussed patient with patient placement at the [facility], ultimately after talking to cardiology and hospitalist service there they would prefer her to be admitted in another facility because she does not truly require [facility] care.  Ultimately spoke with Dr. Baez at [facility] and the patient was accepted for transfer and observation admission for ongoing evaluation of high risk chest pain and hypoxia. Patient       I have reviewed the nursing notes.    I have reviewed the findings, diagnosis, plan and need for follow up with the patient.    Final diagnoses:   Chest pain, unspecified type   Hypoxia       12/20/2020   [facility]     Jeb Almaguer MD  12/20/20 8386

## 2020-12-20 NOTE — ED NOTES
Pt brought in by ambulance for c/o Lt shoulder and arm pain and mid sternal chest pressure that started at 2130, pt states she had SOB at 2300 in addition to the chest pain and took 365 mg aspirin at 2215, a NTG SL at 2300 and repeated x 1 at 2305 and then called 911 as the chest pain was only slightly improved and the pain is the same as when she had a heart attack 4 to 5 years ago. Pt denies nausea, dizziness, and states the SOB is gone now. Pt reports the pain was 7/10 at home and is 5/10 right now. Skin warm, dry and color WNL. Dr Wetzel at the bedside.

## 2020-12-20 NOTE — ED NOTES
Face to face report given with opportunity to observe patient.    Report given to Jennifer Garrett RN   12/20/2020  7:18 AM

## 2020-12-20 NOTE — ED NOTES
Noted O2 saturations down to 88% while pt awake and talking. O2 started at 2 L/min/NC, Dr Wetzel notified, no additional orders received.

## 2020-12-20 NOTE — ED NOTES
Dr Wetzel aware that pt is still having 3/10 Lt shoulder pain, no chest pain and NTG gtt up to 50 mcg/min. Plan tylenol.

## 2020-12-21 ENCOUNTER — APPOINTMENT (OUTPATIENT)
Dept: CARDIOLOGY | Facility: CLINIC | Age: 85
End: 2020-12-21
Attending: PHYSICIAN ASSISTANT
Payer: MEDICARE

## 2020-12-21 PROBLEM — I20.0 UNSTABLE ANGINA PECTORIS (H): Status: ACTIVE | Noted: 2020-12-20

## 2020-12-21 LAB
TROPONIN I SERPL-MCNC: 0.02 UG/L (ref 0–0.04)
TROPONIN I SERPL-MCNC: 0.04 UG/L (ref 0–0.04)

## 2020-12-21 PROCEDURE — C1887 CATHETER, GUIDING: HCPCS | Performed by: INTERNAL MEDICINE

## 2020-12-21 PROCEDURE — 272N000001 HC OR GENERAL SUPPLY STERILE: Performed by: INTERNAL MEDICINE

## 2020-12-21 PROCEDURE — 99203 OFFICE O/P NEW LOW 30 MIN: CPT | Mod: 25 | Performed by: INTERNAL MEDICINE

## 2020-12-21 PROCEDURE — 250N000013 HC RX MED GY IP 250 OP 250 PS 637: Performed by: PHYSICIAN ASSISTANT

## 2020-12-21 PROCEDURE — C1769 GUIDE WIRE: HCPCS | Performed by: INTERNAL MEDICINE

## 2020-12-21 PROCEDURE — 99152 MOD SED SAME PHYS/QHP 5/>YRS: CPT | Performed by: INTERNAL MEDICINE

## 2020-12-21 PROCEDURE — 999N000071 HC STATISTIC HEART CATH LAB OR EP LAB

## 2020-12-21 PROCEDURE — 250N000009 HC RX 250: Performed by: INTERNAL MEDICINE

## 2020-12-21 PROCEDURE — 99225 PR SUBSEQUENT OBSERVATION CARE,LEVEL II: CPT | Performed by: PHYSICIAN ASSISTANT

## 2020-12-21 PROCEDURE — 93306 TTE W/DOPPLER COMPLETE: CPT | Mod: 26 | Performed by: INTERNAL MEDICINE

## 2020-12-21 PROCEDURE — 84484 ASSAY OF TROPONIN QUANT: CPT | Performed by: PHYSICIAN ASSISTANT

## 2020-12-21 PROCEDURE — 999N000208 ECHOCARDIOGRAM COMPLETE

## 2020-12-21 PROCEDURE — 250N000011 HC RX IP 250 OP 636: Performed by: INTERNAL MEDICINE

## 2020-12-21 PROCEDURE — 99153 MOD SED SAME PHYS/QHP EA: CPT | Performed by: INTERNAL MEDICINE

## 2020-12-21 PROCEDURE — 36415 COLL VENOUS BLD VENIPUNCTURE: CPT | Performed by: PHYSICIAN ASSISTANT

## 2020-12-21 PROCEDURE — 999N000147 HC STATISTIC PT IP EVAL DEFER

## 2020-12-21 PROCEDURE — 93454 CORONARY ARTERY ANGIO S&I: CPT | Performed by: INTERNAL MEDICINE

## 2020-12-21 PROCEDURE — 999N000184 HC STATISTIC TELEMETRY

## 2020-12-21 PROCEDURE — 250N000013 HC RX MED GY IP 250 OP 250 PS 637: Performed by: INTERNAL MEDICINE

## 2020-12-21 PROCEDURE — G0378 HOSPITAL OBSERVATION PER HR: HCPCS

## 2020-12-21 PROCEDURE — 255N000002 HC RX 255 OP 636: Performed by: INTERNAL MEDICINE

## 2020-12-21 PROCEDURE — C1894 INTRO/SHEATH, NON-LASER: HCPCS | Performed by: INTERNAL MEDICINE

## 2020-12-21 RX ORDER — VERAPAMIL HYDROCHLORIDE 2.5 MG/ML
INJECTION, SOLUTION INTRAVENOUS
Status: DISCONTINUED | OUTPATIENT
Start: 2020-12-21 | End: 2020-12-21 | Stop reason: HOSPADM

## 2020-12-21 RX ORDER — SODIUM CHLORIDE 9 MG/ML
INJECTION, SOLUTION INTRAVENOUS CONTINUOUS
Status: DISCONTINUED | OUTPATIENT
Start: 2020-12-21 | End: 2020-12-22 | Stop reason: HOSPADM

## 2020-12-21 RX ORDER — FENTANYL CITRATE 50 UG/ML
25-50 INJECTION, SOLUTION INTRAMUSCULAR; INTRAVENOUS
Status: ACTIVE | OUTPATIENT
Start: 2020-12-21 | End: 2020-12-21

## 2020-12-21 RX ORDER — IOPAMIDOL 755 MG/ML
INJECTION, SOLUTION INTRAVASCULAR
Status: DISCONTINUED | OUTPATIENT
Start: 2020-12-21 | End: 2020-12-21 | Stop reason: HOSPADM

## 2020-12-21 RX ORDER — LIDOCAINE 40 MG/G
CREAM TOPICAL
Status: CANCELLED | OUTPATIENT
Start: 2020-12-21

## 2020-12-21 RX ORDER — LORAZEPAM 2 MG/ML
0.5 INJECTION INTRAMUSCULAR
Status: CANCELLED | OUTPATIENT
Start: 2020-12-21

## 2020-12-21 RX ORDER — LORAZEPAM 0.5 MG/1
0.5 TABLET ORAL
Status: CANCELLED | OUTPATIENT
Start: 2020-12-21

## 2020-12-21 RX ORDER — NALOXONE HYDROCHLORIDE 0.4 MG/ML
0.4 INJECTION, SOLUTION INTRAMUSCULAR; INTRAVENOUS; SUBCUTANEOUS
Status: DISCONTINUED | OUTPATIENT
Start: 2020-12-21 | End: 2020-12-22 | Stop reason: HOSPADM

## 2020-12-21 RX ORDER — FLUMAZENIL 0.1 MG/ML
0.2 INJECTION, SOLUTION INTRAVENOUS
Status: ACTIVE | OUTPATIENT
Start: 2020-12-21 | End: 2020-12-22

## 2020-12-21 RX ORDER — HEPARIN SODIUM 1000 [USP'U]/ML
INJECTION, SOLUTION INTRAVENOUS; SUBCUTANEOUS
Status: DISCONTINUED | OUTPATIENT
Start: 2020-12-21 | End: 2020-12-21 | Stop reason: HOSPADM

## 2020-12-21 RX ORDER — ACETAMINOPHEN 325 MG/1
650 TABLET ORAL EVERY 4 HOURS PRN
Status: DISCONTINUED | OUTPATIENT
Start: 2020-12-21 | End: 2020-12-22 | Stop reason: HOSPADM

## 2020-12-21 RX ORDER — FENTANYL CITRATE 50 UG/ML
INJECTION, SOLUTION INTRAMUSCULAR; INTRAVENOUS
Status: DISCONTINUED | OUTPATIENT
Start: 2020-12-21 | End: 2020-12-21 | Stop reason: HOSPADM

## 2020-12-21 RX ORDER — NALOXONE HYDROCHLORIDE 0.4 MG/ML
0.2 INJECTION, SOLUTION INTRAMUSCULAR; INTRAVENOUS; SUBCUTANEOUS
Status: DISCONTINUED | OUTPATIENT
Start: 2020-12-21 | End: 2020-12-22 | Stop reason: HOSPADM

## 2020-12-21 RX ORDER — ATROPINE SULFATE 0.1 MG/ML
0.5 INJECTION INTRAVENOUS
Status: ACTIVE | OUTPATIENT
Start: 2020-12-21 | End: 2020-12-22

## 2020-12-21 RX ORDER — NITROGLYCERIN 5 MG/ML
VIAL (ML) INTRAVENOUS
Status: DISCONTINUED | OUTPATIENT
Start: 2020-12-21 | End: 2020-12-21 | Stop reason: HOSPADM

## 2020-12-21 RX ORDER — THYROID 60 MG/1
60 TABLET ORAL DAILY
Status: DISCONTINUED | OUTPATIENT
Start: 2020-12-21 | End: 2020-12-22 | Stop reason: HOSPADM

## 2020-12-21 RX ORDER — SODIUM CHLORIDE 9 MG/ML
INJECTION, SOLUTION INTRAVENOUS CONTINUOUS
Status: CANCELLED | OUTPATIENT
Start: 2020-12-21

## 2020-12-21 RX ORDER — POTASSIUM CHLORIDE 1500 MG/1
20 TABLET, EXTENDED RELEASE ORAL
Status: CANCELLED | OUTPATIENT
Start: 2020-12-21

## 2020-12-21 RX ADMIN — METOPROLOL TARTRATE 50 MG: 50 TABLET, FILM COATED ORAL at 20:02

## 2020-12-21 RX ADMIN — METOPROLOL TARTRATE 100 MG: 50 TABLET, FILM COATED ORAL at 09:34

## 2020-12-21 RX ADMIN — ASPIRIN 81 MG: 81 TABLET, DELAYED RELEASE ORAL at 09:34

## 2020-12-21 RX ADMIN — THYROID, PORCINE 60 MG: 60 TABLET ORAL at 13:42

## 2020-12-21 RX ADMIN — CLOPIDOGREL BISULFATE 75 MG: 75 TABLET, FILM COATED ORAL at 09:35

## 2020-12-21 RX ADMIN — HUMAN ALBUMIN MICROSPHERES AND PERFLUTREN 9 ML: 10; .22 INJECTION, SOLUTION INTRAVENOUS at 09:42

## 2020-12-21 RX ADMIN — SENNOSIDES 1 TABLET: 8.6 TABLET, FILM COATED ORAL at 20:02

## 2020-12-21 RX ADMIN — AMLODIPINE BESYLATE 5 MG: 5 TABLET ORAL at 09:32

## 2020-12-21 NOTE — PROGRESS NOTES
Observation goals PRIOR TO DISCHARGE     Comments: List all goals to be met before discharge home:     - Serial troponins and stress test complete. - Not met, in process 2x negative from previous hospital, 1x negative ([facility])     - Seen and cleared by consultant if applicable - Not met    - Adequate pain control on oral analgesia - partially met (RRT, 1x nitrostat)     - Vital signs normal or at patient baseline- partially met, elevated BP's     - Safe disposition plan has been identified- Met     - Nurse to notify provider when observation goals have been met and patient is ready for discharge.

## 2020-12-21 NOTE — PROGRESS NOTES
Care Suites Post Procedure Note    Patient Information  Name: Kary Moody  Age: 87 year old    Post Procedure  Time patient returned to Care Suites: 1630  Concerns/abnormal assessment: VSS. Right radial site soft, CDI with tr band. No pain. Given water and juice.   If abnormal assessment, provider notified: N/A  Plan/Other: per orders    Mireille Mcmahon RN   1700 ordered meal  1750 eating meal  1830 tr band off at 1815. Site soft, no bleed or hematoma. Bandaid to site.  No pain. VSS. Report called to Saritha obs dominique and will transfer pt via cart back to obs 19 in about 15 more minutes, after 1845 check.

## 2020-12-21 NOTE — PROGRESS NOTES
[facility]    Medicine History and Physical - Hospitalist Service       Date of Admission:  12/20/2020    Assessment & Plan   Kary Moody is a 87 year old female admitted on 12/20/2020. Significant PMHx CAD with STEMI 3/2020 and HTN who was directly admitted under observation status from [facility] ED where she presented with atypical chest pain.       Atypical chest pain.  CAD with recent STEMI 3/2020 with arthrectomy and BO to RCA.  HTN / HLD.  Presented to [facility] ED with left shoulder pain and upper chest pain.  She indicated this was similar to previous STEMI from earlier this year.  EKG sinus without dynamic changes. Serial troponin detectable but within reference range.  BNP negative (459).  Treated with nitroglycerin drip at ED.  - Cardiology consult appreciated. Cor angiogram this afternoon.  - Telemetry without event.  - TTE 12/2020 showing no WMA (though inferior wall not imaged in standard views, normal appearing on alternative view) asymmetric LV hypertrophy without evidence LVOT, moderate LVH, evidence HCM without obstructive pattern.  - Cont PTA ASA, Plavix, amlodipine, Lopressor, and resume statin at observation discharge.     Acute hypoxic respiratory failure. - Resolved  O2 saturation on RA at 88-90% in ED requirement supplemental O2. No infectious signs/symptoms, SOB/MIDDLETON. COVID-19 / influenza PCR negative.  CXR negative.  D-dimer elevated at 1.1, CTA chest showed bilateral ground glass opacities.    Hypothyroidism. [place] thyroid restarted at patient request under observation status.     Constipation. PRN bowel regiment available.       Vit D deficiency  Folate deficiency  B12 deficiency  - Resume PTA replacement at observation discharge.       Hx CVA/TIA.  - Resume PTA ASA, Plavix and antihypertensive medications. Statin at obs discharge.    Diet: NPO per Anesthesia Guidelines for Procedure/Surgery Except for: Meds    [place] Catheter: not present    DVT  Prophylaxis: Low Risk/Ambulatory with no VTE prophylaxis indicated  Code Status: Full Code    Expected discharge: today vs tmrw pending angiogram findings and cardiology recommendations.     The patient's care was discussed with the Attending Physician, Dr. Baez.    JoAnna K. Barthell, PA-C  Hospitalist Service  [facility]    ______________________________________________________________________    Interval History   No cp since transfer. No sob/deluna, orthopnea, LE edema. Angiogram this afternoon.    Data reviewed today: I reviewed all medications, new labs and imaging results over the last 24 hours. I personally reviewed no images or EKG's today.    Physical Exam   Vital Signs: Temp: 98.3  F (36.8  C) Temp src: Oral BP: 136/72 Pulse: 63   Resp: 16 SpO2: 97 % O2 Device: None (Room air)    Weight: 165 lbs 11.2 oz  Constitutional: Appears stated age, no acute distress.  Respiratory: Breath sounds CTA. No increased work of breathing.  Cardiovascular: RRR, no rub or murmur. No peripheral edema.  GI: Soft, overweight, non-tender, non-distended.  Skin: Warm, dry, no rashes or lesions.    Medications       amLODIPine  5 mg Oral Daily     aspirin  81 mg Oral Daily     clopidogrel  75 mg Oral Daily     metoprolol tartrate  100 mg Oral QAM     metoprolol tartrate  50 mg Oral QPM     polyethylene glycol  17 g Oral Daily     sodium chloride (PF)  3 mL Intracatheter Q8H     thyroid  60 mg Oral Daily       Data   Recent Labs   Lab 12/21/20  1000 12/21/20  0112 12/20/20 2001 12/20/20  0155 12/20/20  0155   WBC  --   --   --   --  6.8   HGB  --   --   --   --  12.0   MCV  --   --   --   --  86   PLT  --   --   --   --  214   NA  --   --   --   --  139   POTASSIUM  --   --  4.0  --  3.8   CHLORIDE  --   --   --   --  107   CO2  --   --   --   --  26   BUN  --   --   --   --  21   CR  --   --   --   --  0.79   ANIONGAP  --   --   --   --  6   CANDACE  --   --   --   --  10.1   GLC  --   --   --   --  112*    ALBUMIN  --   --   --   --  3.5   PROTTOTAL  --   --   --   --  7.2   BILITOTAL  --   --   --   --  0.5   ALKPHOS  --   --   --   --  119   ALT  --   --   --   --  29   AST  --   --   --   --  26   TROPI 0.024 0.037 0.025   < > 0.032    < > = values in this interval not displayed.       Imaging:  Recent Results (from the past 24 hour(s))   Echocardiogram Complete    Narrative    [id number]  [id number]  [id number]  [id number]^KASSIE^MARIAH^JOSI           [facility]  Echocardiography Laboratory  [address] [place] [address]        Name: LUISITO GARCIA  MRN: [id number]  : 1933  Study Date: 2020 09:05 AM  Age: 87 yrs  Gender: Female  Patient Location: [facility]  Reason For Study: Chest Pain  Ordering Physician: MARIAH FERNANDO  Referring Physician: CHELI EBRMEO  Performed By: Martínez Ramos RDCS     BSA: 1.8 m2  Height: 62 in  Weight: 165 lb  HR: 74  BP: 140/62 mmHg  _____________________________________________________________________________  __        Procedure  Complete Portable Echo Adult. Optison (NDC #[id number]) given intravenously.  _____________________________________________________________________________  __        Interpretation Summary     Left ventricular hypertrophy: asymmetric with no LVOT obstruction  There is moderate concentric left ventricular hypertrophy.  Left ventricular systolic function is normal.  The ascending aorta is Mildly dilated.  Inferior wall was not imaged in all standard views --no apical 2C views--but  in the basal SAX view the inferior wall is normal (recent stent to RCA by  history). The distal septum/apex is thinner than the prox septal wall but the  apex is moving and this appears to be HCM with out obstruction pattern  _____________________________________________________________________________  __        Left Ventricle  The left ventricle is normal in size. Left ventricular hypertrophy: asymmetric  with no LVOT obstruction. There  is moderate concentric left ventricular  hypertrophy. Proximal septal thickening is noted. Left ventricular systolic  function is normal. The visual ejection fraction is estimated at 60%. Grade I  or early diastolic dysfunction. Regional wall motion abnormalities cannot be  excluded due to limited visualization. Inferior wall was not imaged in all  standard views --no apical 2C views--but in the basal SAX view the inferior  wall is normal (recent stent to RCA by history). The distal septum/apex is  thinner than the prox septal wall but the apex is moving and this appears to  be HCM with out obstruction pattern. There is no thrombus seen in the left  ventricle.     Right Ventricle  The right ventricle is normal in structure, function and size.     Atria  There is mild biatrial enlargement. Lipomatous hypertrophy of the interatrial  septum is noted.     Mitral Valve  There is mild mitral annular calcification.        Tricuspid Valve  Normal tricuspid valve. Right ventricular systolic pressure could not be  approximated due to inadequate tricuspid regurgitation.     Aortic Valve  There is moderate aortic sclerosis of the non-coronary cusp. No aortic  regurgitation is present. No hemodynamically significant valvular aortic  stenosis.     Pulmonic Valve  The pulmonic valve is not well seen, but is grossly normal.     Vessels  The aortic root is normal size. The ascending aorta is Mildly dilated.     Pericardium  The pericardium appears normal.        Rhythm  Sinus rhythm was noted.  _____________________________________________________________________________  __  MMode/2D Measurements & Calculations  IVSd: 2.3 cm     LVIDd: 3.4 cm  LVIDs: 2.3 cm  LVPWd: 1.6 cm  FS: 32.8 %  LV mass(C)d: 297.8 grams  LV mass(C)dI: 169.0 grams/m2  Ao root diam: 3.5 cm  LA dimension: 3.9 cm  asc Aorta Diam: 4.0 cm  LA/Ao: 1.1  LA Volume (BP): 57.0 ml  LA Volume Index (BP): 32.4 ml/m2  RWT: 0.96           Doppler Measurements &  Calculations  MV E max jaye: 77.0 cm/sec  MV A max jaye: 100.7 cm/sec  MV E/A: 0.76  MV dec time: 0.25 sec  PA acc time: 0.11 sec  E/E' av.5  Lateral E/e': 13.8  Medial E/e': 13.2           _____________________________________________________________________________  __           Report approved by: Darinel Morgan 2020 12:36 PM

## 2020-12-21 NOTE — CONSULTS
Cardiology Consult Note          Assessment and Plan:     Unstable angina pectoris (H)  Borderline trending down troponins  Previous STEMI and Coronary stenting                 Interval History:     Preston, study is a pleasant 87-year-old with known history coronary disease recent history of ST elevation myocardial infarction 2020.  She has had previous cardiac catheterization coronary angiogram.  This was performed at [facility].  She lives in the [facility].  She presented to [facility] ED with left shoulder pain and upper chest pain.  She states that this was identical to her previous heart attack pain.  Her troponins have been trending down but borderline a 0.032, 0.021, and 0.024.  She says the pain started out in her left shoulder went down her arm and then became chest discomfort.  This was associated with some shortness of breath as well she took 2 nitroglycerin before coming into the ER.  She says the pain lasted for 2 to 3 hours and gradually resolved.    Otherwise she has been doing well.  She has had no fever chills cough melena bright red blood per rectum.  Her COVID-19 test is negative.  She has a previous history of CVA/TIA.  She has been noted to have dyspnea on exertion over the past few months on a regular basis which is new for her.  EKG had no acute ischemic changes.              Review of Systems:   As per subjective, otherwise 5 systems reviewed and negative.           Physical Exam:   Blood pressure 134/69, pulse 69, temperature 96.7  F (35.9  C), temperature source Oral, resp. rate 16, weight 75.2 kg (165 lb 11.2 oz), SpO2 94 %.      Vital Sign Ranges  Temperature Temp  Av.5  F (36.4  C)  Min: 96.7  F (35.9  C)  Max: 97.9  F (36.6  C)   Blood pressure Systolic (24hrs), Av , Min:117 , Max:158        Diastolic (24hrs), Av, Min:53, Max:108      Pulse Pulse  Av  Min: 57  Max: 81   Respirations Resp  Av.1  Min: 16  Max: 26   Pulse oximetry SpO2  Av.2 %   Min: 93 %  Max: 97 %         Intake/Output Summary (Last 24 hours) at 12/21/2020 0847  Last data filed at 12/20/2020 2200  Gross per 24 hour   Intake 200 ml   Output --   Net 200 ml       Constitutional:   NAD   Skin:   Warm and dry   Head:   Nontraumatic   Neck:   Supple, symmetrical, trachea midline, no adenopathy, thyroid symmetric, not enlarged and no tenderness, skin normal   Lungs:   normal   Cardiovascular:   Normal apical impulse, regular rate and rhythm, normal S1 and S2, no S3 or S4, and no murmur noted    Abdomen:   Benign   Extremities and Back:   Symmetric, no curvature, spinous processes are non-tender on palpation, paraspinous muscles are non-tender on palpation, no costal vertebral tenderness   Neurological:   Grossly nonfocal            Medications:     No current outpatient medications on file.                Data:     Results for orders placed or performed during the hospital encounter of 12/20/20 (from the past 24 hour(s))   EKG 12-lead, tracing only   Result Value Ref Range    Interpretation ECG Click View Image link to view waveform and result    Troponin I - Now then in 4 hours x 3   Result Value Ref Range    Troponin I ES 0.025 0.000 - 0.045 ug/L   Potassium   Result Value Ref [facility]    Potassium 4.0 3.4 - 5.3 mmol/L   Magnesium   Result Value Ref [facility]    Magnesium 2.0 1.6 - 2.3 mg/dL   TSH with free T4 reflex   Result Value Ref [facility]    TSH 1.60 0.40 - 4.00 mU/L   Troponin I - Now then in 4 hours x 3   Result Value Ref [facility]    Troponin I ES 0.037 0.000 - 0.045 ug/L

## 2020-12-21 NOTE — PLAN OF CARE
PT: Orders received, chart reviewed, spoke with nursing. Pt admitted under observation status with chest pain and hypoxia which have now resolved. Pt has been up independently and has no skilled PT needs at this time. PT to complete orders.

## 2020-12-21 NOTE — UTILIZATION REVIEW
Concurrent stay review; Secondary Review Determination     Under the authority of the Utilization Management Committee, the utilization review process indicated a secondary review on the above patient.  The review outcome is based on review of the medical records, discussions with staff, and applying clinical experience noted on the date of the review.          (x) Observation Status Appropriate - Concurrent stay review    RATIONALE FOR DETERMINATION   86 yo female with CAD, h/o STEMI in March 2020, hypertension, h/o CVA, hypothyroidism who was transferred from an outside hospital ED ([facility] in [place]) for atypical chest pain. Is not on therapeutic heparin at present. Pending coronary angiogram today, patient may be discharged home if no intervention needed.    Patient is clinically improving and there is no clear indication to change patient's status to inpatient. The severity of illness, intensity of service provided, expected LOS and risk for adverse outcome make the care appropriate for observation.    This document was produced using voice recognition software     The information on this document is developed by the utilization review team in order for the business office to ensure compliance.  This only denotes the appropriateness of proper admission status and does not reflect the quality of care rendered.         The definitions of Inpatient Status and Observation Status used in making the determination above are those provided in the CMS Coverage Manual, Chapter 1 and Chapter 6, section 70.4.      Sincerely,   Enedina Roman MD  Utilization Review  Physician Advisor  [facility].

## 2020-12-21 NOTE — PLAN OF CARE
1900 - 0700 report. COVID negative. A&O x4, VSS, afebrile on RA. Beginning of shift RRT for chest pain. 1x nitrostat with relief. Tele: SR with 1st AVB and PVC's. Trops 2x   negative. No c/o SOB, N/V or dizziness. Tolerating reg diet, NPO @ 5 am, ambulates independently in room. Plan: Echo, Cardiology consult. Will continue to monitor.

## 2020-12-21 NOTE — DISCHARGE INSTRUCTIONS
Cardiac Angiogram Discharge Instructions - Radial    After you go home:      Have an adult stay with you until tomorrow.    Drink extra fluids for 2 days.    You may resume your normal diet.    No smoking       For 24 hours - due to the sedation you received:    Relax and take it easy.    Do NOT make any important or legal decisions.    Do NOT drive or operate machines at home or at work.    Do NOT drink alcohol.    Care of Wrist Puncture Site:      For the first 24 hrs - check the puncture site every 1-2 hours while awake.    It is normal to have soreness at the puncture site and mild tingling in your hand for up to 3 days.    Remove the bandaid after 24 hours. If there is minor oozing, apply another bandaid and remove it after 12 hours.    You may shower tomorrow.  Do NOT take a bath, or use a hot tub or pool for at least 3 days. Do NOT scrub the site. Do not use lotion or powder near the puncture site.           Activity:        For 2 days:     do not use your hand or arm to support your weight (such as rising from a chair)     do not bend your wrist (such as lifting a garage door).    do not lift more than 5 pounds or exercise your arm (such as tennis, golf or bowling).    Do NOT do any heavy activity such as exercise, lifting, or straining.     Bleeding:      If you start bleeding from the site in your wrist, sit down and press firmly on/above the site for 10 minutes.     Once bleeding stops, keep arm still for 2 hours.     Call [facility] as soon as you can.       Call 911 right away if you have heavy bleeding or bleeding that does not stop.      Medicines:      If you are taking an antiplatelet medication such as Plavix, Brilinta or Effient, do not stop taking it until you talk to your cardiologist.        If you are on Metformin (Glucophage), do not restart it until you have blood tests (within 2 to 3 days after discharge).  After you have your blood drawn, you may restart the Metformin.     Take your  medications, including blood thinners, unless your provider tells you not to.      If you take Coumadin (Warfarin), have your INR checked by your provider in  3-5 days. Call your clinic to schedule this.    If you have stopped any medicines, check with your provider about when to restart them.    Follow Up Appointments:      Follow up with [facility] Nurse Practitioner at [facility] of patient preference in 7-10 days.    Call the clinic if:      You have a large or growing hard lump around the site.    The site is red, swollen, hot or tender.    Blood or fluid is draining from the site.    You have chills or a fever greater than 101 F (38 C).    Your arm feels numb, cool or changes color.    You have hives, a rash or unusual itching.    Any questions or concerns.          [facility] [place]:    [phone] [facility] (7 days a week)

## 2020-12-21 NOTE — CODE/RAPID RESPONSE
[facility]    House VIC RRT Note  12/20/2020   Time Called: 1950    RRT called for: Chest pain    Assessment & Plan     Midsternal chest pain with radiation to L lateral neck, L shoulder and down LUE.  - Upon arrival, pt lying in bed, awake, alert, in no apparent distress, conversing and smiling with staff.  Pt and nursing report when pt went to get out of bed, to remove her pants, pt noted an acute onset of L shoulder pain with radiation down her LUE.  At that time, nursing assisted pt back to bed.  Pt reports no associated SOB, nausea or diaphoresis.  Pt describes chest pain as tender, sharp.  Pt's HR 70s, SBP 150s, RR 10s, O2 sats > 92% on RA, afebrile.          INTERVENTIONS:  - Stat trop, Mg, K, TSH  - Stat EKG previously ordered prior to arrival  - 1 SL nitroglycerin tab administered prior to arrival with noted near relief of symptoms shortly thereafter.  Pt states she would not take another nitroglycerin for her current minimal pain that is resolving.  (Pt has nitroglycerin Rx PTA).    At the end of the RRT pt remains hemodynamically stable, in no apparent distress    Discussed with and defer further cares to nursing and hospitalist     Interval History     Kary Moody is a 87 year old female who was admitted on 12/20/2020 for chest pain.    Medical history significant for: CAD s/p stent, HTN, HLP, hypothyroidism, Vitamin D deficiency, folate deficiency, B12 deficiency, CVA/TIA    Code Status: Full Code    Allergies   Allergies   Allergen Reactions     Atorvastatin      Celebrex [Celecoxib] Nausea and Vomiting     Codeine Phosphate      Flu Virus Vaccine      Ibuprofen Sodium      Latex Hives     Lisinopril Cough     Penicillins      Welchol [Colesevelam]      Niaspan [Niacin] Rash     Upset Stomach       Physical Exam   Vital Signs with Ranges:  Temp:  [97.5  F (36.4  C)-98.4  F (36.9  C)] 97.9  F (36.6  C)  Pulse:  [60-92] 74  Resp:  [10-68] 22  BP: (113-181)/()  158/79  SpO2:  [88 %-98 %] 96 %  No intake/output data recorded.    Constitutional: Pt lying in bed, awake, alert, in no apparent distress  Neck: No upper airway wheezes or stridor noted  Pulmonary: In no apparent respiratory distress, clear to auscultation bilaterally, no crackles or wheezes noted  Cardiovascular: Regular rate and rhythm, normal S1S2, no murmur, rub or gallop noted  GI: Soft, nondistended  Skin/Integumen: Warm, dry, no mottling noted  Neuro: A/Ox4, clear speech, no focal neuro deficits noted  Psych:  Calm  Extremities: No pitting BLE edema noted    Data     EKG:  Interpreted by ASMITA Davis CNP  Time reviewed: 1953  Symptoms at time of EKG: Chest/neck/LUE pain   Rhythm: normal sinus   Rate: Normal  Axis: Normal  Ectopy: premature ventricular contractions (frequent)  Conduction: normal  ST Segments/ T Waves: No ST-T wave changes and No acute ischemic changes  Q Waves: none  Comparison to prior: No old EKG available    Clinical Impression: no acute changes      Troponin:    Recent Labs   Lab Test 12/20/20 2001   TROPI 0.025       Time Spent on this Encounter   I spent 10 minutes on the unit/floor managing the care of Kary Moody. Over 50% of my time was spent counseling the patient and/or coordinating care regarding services listed in this note.    ASMITA Davis CNP  [place] [facility]

## 2020-12-21 NOTE — PHARMACY-ADMISSION MEDICATION HISTORY
Pharmacy Medication History  Admission medication history interview status for the 12/20/2020  admission is complete. See EPIC admission navigator for prior to admission medications     Medication history sources: Patient and Care Everywhere  Location of interview: Phone  Adherence Assessment: Good    Additional medication history information:   Removed losartan per patient report.   Added rosuvastatin.  Modified metoprolol and folic acid dose per patient.     Medication reconciliation completed by provider prior to medication history? Yes    Time spent in this activity: 15 minutes     Prior to Admission medications    Medication Sig Last Dose Taking? Auth Provider   amLODIPine (NORVASC) 5 MG tablet Take 5 mg by mouth daily 12/20/2020 at Unknown time Yes Reported, Patient   aspirin 81 MG tablet Take 81 mg by mouth daily 12/20/2020 at Unknown time Yes Reported, Patient   clopidogrel (PLAVIX) 75 MG tablet Take 75 mg by mouth daily  12/20/2020 at Unknown time Yes Reported, Patient   Cyanocobalamin 500 MCG TBDP Take 500 mcg by mouth daily  12/20/2020 at Unknown time Yes Reported, Patient   folic acid (FOLVITE) 1 MG tablet Take 1 mg by mouth daily  12/19/2020 at Unknown time Yes Reported, Patient   metoprolol tartrate (LOPRESSOR) 50 MG tablet Take 100 mg in the morning and 50 mg in the evening 12/20/2020 at Unknown time Yes Reported, Patient   nitroGLYcerin (NITROSTAT) 0.4 MG sublingual tablet Place 1 tablet under the tongue every 5 minutes as needed for chest pain 12/20/2020 at Unknown time Yes Unknown, Entered By History   polyethylene glycol (MIRALAX/GLYCOLAX) Packet Take 1 packet by mouth daily 12/19/2020 at Unknown time Yes Reported, Patient   rosuvastatin (CRESTOR) 40 MG tablet Take 40 mg by mouth daily 12/19/2020 at Unknown time Yes Unknown, Entered By History   thyroid (ARMOUR) 60 MG tablet Take 60 mg by mouth daily  12/19/2020 at Unknown time Yes Reported, Patient   vitamin D3 (CHOLECALCIFEROL) 10 MCG (400 UNIT)  capsule Take 1 capsule by mouth daily 12/20/2020 at Unknown time Yes Reported, Patient     The information provided in this note is only as accurate as the sources available at the time of the update(s).     Missy Ramachandran, SlavaD, BCPS

## 2020-12-22 VITALS
RESPIRATION RATE: 18 BRPM | WEIGHT: 165.7 LBS | BODY MASS INDEX: 30.31 KG/M2 | HEART RATE: 64 BPM | DIASTOLIC BLOOD PRESSURE: 71 MMHG | SYSTOLIC BLOOD PRESSURE: 144 MMHG | OXYGEN SATURATION: 95 % | TEMPERATURE: 97.5 F

## 2020-12-22 LAB
ANION GAP SERPL CALCULATED.3IONS-SCNC: 3 MMOL/L (ref 3–14)
BUN SERPL-MCNC: 15 MG/DL (ref 7–30)
CALCIUM SERPL-MCNC: 9.9 MG/DL (ref 8.5–10.1)
CHLORIDE SERPL-SCNC: 105 MMOL/L (ref 94–109)
CO2 SERPL-SCNC: 30 MMOL/L (ref 20–32)
CREAT SERPL-MCNC: 0.68 MG/DL (ref 0.52–1.04)
GFR SERPL CREATININE-BSD FRML MDRD: 79 ML/MIN/{1.73_M2}
GLUCOSE SERPL-MCNC: 96 MG/DL (ref 70–99)
POTASSIUM SERPL-SCNC: 4.3 MMOL/L (ref 3.4–5.3)
SODIUM SERPL-SCNC: 138 MMOL/L (ref 133–144)

## 2020-12-22 PROCEDURE — 80048 BASIC METABOLIC PNL TOTAL CA: CPT | Performed by: PHYSICIAN ASSISTANT

## 2020-12-22 PROCEDURE — 250N000013 HC RX MED GY IP 250 OP 250 PS 637: Mod: GY | Performed by: PHYSICIAN ASSISTANT

## 2020-12-22 PROCEDURE — 250N000013 HC RX MED GY IP 250 OP 250 PS 637: Performed by: INTERNAL MEDICINE

## 2020-12-22 PROCEDURE — 250N000013 HC RX MED GY IP 250 OP 250 PS 637: Performed by: PHYSICIAN ASSISTANT

## 2020-12-22 PROCEDURE — 99217 PR OBSERVATION CARE DISCHARGE: CPT | Performed by: PHYSICIAN ASSISTANT

## 2020-12-22 PROCEDURE — 36415 COLL VENOUS BLD VENIPUNCTURE: CPT | Performed by: PHYSICIAN ASSISTANT

## 2020-12-22 PROCEDURE — G0378 HOSPITAL OBSERVATION PER HR: HCPCS

## 2020-12-22 RX ADMIN — ASPIRIN 81 MG: 81 TABLET, DELAYED RELEASE ORAL at 08:30

## 2020-12-22 RX ADMIN — THYROID, PORCINE 60 MG: 60 TABLET ORAL at 08:30

## 2020-12-22 RX ADMIN — POLYETHYLENE GLYCOL 3350 17 G: 17 POWDER, FOR SOLUTION ORAL at 08:31

## 2020-12-22 RX ADMIN — AMLODIPINE BESYLATE 5 MG: 5 TABLET ORAL at 08:30

## 2020-12-22 RX ADMIN — CLOPIDOGREL BISULFATE 75 MG: 75 TABLET, FILM COATED ORAL at 08:30

## 2020-12-22 RX ADMIN — METOPROLOL TARTRATE 100 MG: 50 TABLET, FILM COATED ORAL at 08:30

## 2020-12-22 RX ADMIN — ACETAMINOPHEN 650 MG: 325 TABLET, FILM COATED ORAL at 11:13

## 2020-12-22 NOTE — PROGRESS NOTES
Observation goals PRIOR TO DISCHARGE    Comments: List all goals to be met before discharge home:   - Serial troponins and stress test complete.- Met  - Seen and cleared by consultant if applicable -not met  - Adequate pain control on oral analgesia -met  - Vital signs normal or at patient baseline -met  - Safe disposition plan has been identified-not met   - Nurse to notify provider when observation goals have been met and patient is ready for discharge.

## 2020-12-22 NOTE — PROGRESS NOTES
Observation goals PRIOR TO DISCHARGE    Comments: List all goals to be met before discharge home:   - Serial troponins and stress test complete: Met    - Seen and cleared by consultant if applicable : Not met    - Adequate pain control on oral analgesia : Met    - Vital signs normal or at patient baseline : Partially met    - Safe disposition plan has been identified : No    - Nurse to notify provider when observation goals have been met and patient is ready for discharge.

## 2020-12-22 NOTE — PLAN OF CARE
Pt discharging home at this time w/ all her belongings.  CMS intact.AVS and education given. Questions answered.

## 2020-12-22 NOTE — PLAN OF CARE
AO. VSS on RA ex htn. Tele: SB/SR.  Coronary angio completed: no interventions needed. R wrist radial site: TR band: CDI, + radial pulses. Pt denies CP/numbness/tingling. Regular diet: tolerating. L PIV: SL. Discharge pending safe disposition plan.  Observation goals PRIOR TO DISCHARGE     Comments: List all goals to be met before discharge home:   - Serial troponins and stress test complete.: met: Angio completed today  - Seen and cleared by consultant if applicable: cardiology recommendations?  - Adequate pain control on oral analgesia: met  - Vital signs normal or at patient baseline: met  - Safe disposition plan has been identified: not met  - Nurse to notify provider when observation goals have been met and patient is ready for discharge.

## 2020-12-22 NOTE — PROGRESS NOTES
Observation goals PRIOR TO DISCHARGE     Comments: List all goals to be met before discharge home:   - Serial troponins and stress test complete: met: cornonary angio performed  - Seen and cleared by consultant if applicable: not met: cardiology recommendations requested   - Adequate pain control on oral analgesia: met  - Vital signs normal or at patient baseline: partially met  - Safe disposition plan has been identified: not met  - Nurse to notify provider when observation goals have been met and patient is ready for discharge.

## 2020-12-22 NOTE — PROGRESS NOTES
Romeo Valencia contacted regarding discharge status. Ok to discharge from Cardiovascular standpoint.

## 2020-12-22 NOTE — PLAN OF CARE
covid negative, A&O X4. VSS on RA. Tele SR. Coronary angio completed on yesterday. R wrist radial site CDI + radial pulses. Regular diet. L PIV SL. Denied pain/SOB/Nausea. Possible discharge today. Continue to monitor.

## 2020-12-25 LAB — INTERPRETATION ECG - MUSE: NORMAL

## 2021-05-18 ENCOUNTER — APPOINTMENT (OUTPATIENT)
Dept: CT IMAGING | Facility: HOSPITAL | Age: 86
End: 2021-05-18
Attending: EMERGENCY MEDICINE
Payer: MEDICARE

## 2021-05-18 ENCOUNTER — HOSPITAL ENCOUNTER (EMERGENCY)
Facility: HOSPITAL | Age: 86
Discharge: ANOTHER HOSPITAL | End: 2021-05-18
Attending: EMERGENCY MEDICINE | Admitting: EMERGENCY MEDICINE
Payer: MEDICARE

## 2021-05-18 VITALS
SYSTOLIC BLOOD PRESSURE: 121 MMHG | DIASTOLIC BLOOD PRESSURE: 63 MMHG | TEMPERATURE: 99.6 F | OXYGEN SATURATION: 97 % | BODY MASS INDEX: 30.18 KG/M2 | WEIGHT: 165 LBS | HEART RATE: 95 BPM | RESPIRATION RATE: 35 BRPM

## 2021-05-18 DIAGNOSIS — A41.9 SEPSIS, DUE TO UNSPECIFIED ORGANISM, UNSPECIFIED WHETHER ACUTE ORGAN DYSFUNCTION PRESENT (H): ICD-10-CM

## 2021-05-18 DIAGNOSIS — I50.9 ACUTE CONGESTIVE HEART FAILURE, UNSPECIFIED HEART FAILURE TYPE (H): ICD-10-CM

## 2021-05-18 DIAGNOSIS — R79.89 ELEVATED TROPONIN: ICD-10-CM

## 2021-05-18 DIAGNOSIS — K83.09 ACUTE CHOLANGITIS (H): ICD-10-CM

## 2021-05-18 LAB
ALBUMIN SERPL-MCNC: 2.6 G/DL (ref 3.4–5)
ALBUMIN UR-MCNC: 300 MG/DL
ALP SERPL-CCNC: 882 U/L (ref 40–150)
ALT SERPL W P-5'-P-CCNC: 192 U/L (ref 0–50)
ANION GAP SERPL CALCULATED.3IONS-SCNC: 14 MMOL/L (ref 3–14)
APPEARANCE UR: ABNORMAL
APTT PPP: 31 SEC (ref 22–37)
AST SERPL W P-5'-P-CCNC: 255 U/L (ref 0–45)
BACTERIA #/AREA URNS HPF: ABNORMAL /HPF
BASE DEFICIT BLDV-SCNC: 6.7 MMOL/L
BASOPHILS # BLD AUTO: 0 10E9/L (ref 0–0.2)
BASOPHILS NFR BLD AUTO: 0.2 %
BILIRUB SERPL-MCNC: 7 MG/DL (ref 0.2–1.3)
BILIRUB UR QL STRIP: ABNORMAL
BUN SERPL-MCNC: 18 MG/DL (ref 7–30)
CALCIUM SERPL-MCNC: 8.8 MG/DL (ref 8.5–10.1)
CHLORIDE SERPL-SCNC: 101 MMOL/L (ref 94–109)
CO2 SERPL-SCNC: 17 MMOL/L (ref 20–32)
COLOR UR AUTO: ABNORMAL
CREAT SERPL-MCNC: 0.73 MG/DL (ref 0.52–1.04)
DIFFERENTIAL METHOD BLD: ABNORMAL
EOSINOPHIL # BLD AUTO: 0 10E9/L (ref 0–0.7)
EOSINOPHIL NFR BLD AUTO: 0.2 %
ERYTHROCYTE [DISTWIDTH] IN BLOOD BY AUTOMATED COUNT: 15.2 % (ref 10–15)
GFR SERPL CREATININE-BSD FRML MDRD: 74 ML/MIN/{1.73_M2}
GLUCOSE SERPL-MCNC: 168 MG/DL (ref 70–99)
GLUCOSE UR STRIP-MCNC: 30 MG/DL
GRAN CASTS #/AREA URNS LPF: 12 /LPF
HCO3 BLDV-SCNC: 18 MMOL/L (ref 21–28)
HCT VFR BLD AUTO: 32 % (ref 35–47)
HGB BLD-MCNC: 10.8 G/DL (ref 11.7–15.7)
HGB UR QL STRIP: ABNORMAL
HYALINE CASTS #/AREA URNS LPF: 3 /LPF
IMM GRANULOCYTES # BLD: 0.4 10E9/L (ref 0–0.4)
IMM GRANULOCYTES NFR BLD: 2.1 %
INR PPP: 1.19 (ref 0.86–1.14)
KETONES UR STRIP-MCNC: NEGATIVE MG/DL
LABORATORY COMMENT REPORT: NORMAL
LACTATE BLD-SCNC: 5.1 MMOL/L (ref 0.7–2)
LEUKOCYTE ESTERASE UR QL STRIP: NEGATIVE
LYMPHOCYTES # BLD AUTO: 0.4 10E9/L (ref 0.8–5.3)
LYMPHOCYTES NFR BLD AUTO: 2.1 %
MCH RBC QN AUTO: 28.2 PG (ref 26.5–33)
MCHC RBC AUTO-ENTMCNC: 33.8 G/DL (ref 31.5–36.5)
MCV RBC AUTO: 84 FL (ref 78–100)
MONOCYTES # BLD AUTO: 0.2 10E9/L (ref 0–1.3)
MONOCYTES NFR BLD AUTO: 1.2 %
MUCOUS THREADS #/AREA URNS LPF: PRESENT /LPF
NEUTROPHILS # BLD AUTO: 17 10E9/L (ref 1.6–8.3)
NEUTROPHILS NFR BLD AUTO: 94.2 %
NITRATE UR QL: NEGATIVE
NRBC # BLD AUTO: 0 10*3/UL
NRBC BLD AUTO-RTO: 0 /100
NT-PROBNP SERPL-MCNC: 4781 PG/ML (ref 0–1800)
O2/TOTAL GAS SETTING VFR VENT: 21 %
OXYHGB MFR BLDV: 79 %
PCO2 BLDV: 32 MM HG (ref 40–50)
PH BLDV: 7.36 PH (ref 7.32–7.43)
PH UR STRIP: 6 PH (ref 4.7–8)
PLATELET # BLD AUTO: 204 10E9/L (ref 150–450)
PO2 BLDV: 46 MM HG (ref 25–47)
POTASSIUM SERPL-SCNC: 3.6 MMOL/L (ref 3.4–5.3)
PROT SERPL-MCNC: 6.4 G/DL (ref 6.8–8.8)
RBC # BLD AUTO: 3.83 10E12/L (ref 3.8–5.2)
RBC #/AREA URNS AUTO: 5 /HPF (ref 0–2)
SARS-COV-2 RNA RESP QL NAA+PROBE: NEGATIVE
SODIUM SERPL-SCNC: 132 MMOL/L (ref 133–144)
SOURCE: ABNORMAL
SP GR UR STRIP: >1.05 (ref 1–1.03)
SPECIMEN SOURCE: NORMAL
SQUAMOUS #/AREA URNS AUTO: 1 /HPF (ref 0–1)
TROPONIN I SERPL-MCNC: 0.08 UG/L (ref 0–0.04)
UROBILINOGEN UR STRIP-MCNC: NORMAL MG/DL (ref 0–2)
WBC # BLD AUTO: 18 10E9/L (ref 4–11)
WBC #/AREA URNS AUTO: 10 /HPF (ref 0–5)

## 2021-05-18 PROCEDURE — 250N000011 HC RX IP 250 OP 636: Performed by: EMERGENCY MEDICINE

## 2021-05-18 PROCEDURE — 83605 ASSAY OF LACTIC ACID: CPT | Performed by: EMERGENCY MEDICINE

## 2021-05-18 PROCEDURE — 99291 CRITICAL CARE FIRST HOUR: CPT | Performed by: EMERGENCY MEDICINE

## 2021-05-18 PROCEDURE — 71260 CT THORAX DX C+: CPT | Mod: MG

## 2021-05-18 PROCEDURE — U0003 INFECTIOUS AGENT DETECTION BY NUCLEIC ACID (DNA OR RNA); SEVERE ACUTE RESPIRATORY SYNDROME CORONAVIRUS 2 (SARS-COV-2) (CORONAVIRUS DISEASE [COVID-19]), AMPLIFIED PROBE TECHNIQUE, MAKING USE OF HIGH THROUGHPUT TECHNOLOGIES AS DESCRIBED BY CMS-2020-01-R: HCPCS | Performed by: EMERGENCY MEDICINE

## 2021-05-18 PROCEDURE — 93010 ELECTROCARDIOGRAM REPORT: CPT | Performed by: INTERNAL MEDICINE

## 2021-05-18 PROCEDURE — 93005 ELECTROCARDIOGRAM TRACING: CPT

## 2021-05-18 PROCEDURE — 84484 ASSAY OF TROPONIN QUANT: CPT | Performed by: EMERGENCY MEDICINE

## 2021-05-18 PROCEDURE — 96366 THER/PROPH/DIAG IV INF ADDON: CPT

## 2021-05-18 PROCEDURE — 96368 THER/DIAG CONCURRENT INF: CPT

## 2021-05-18 PROCEDURE — 258N000003 HC RX IP 258 OP 636: Performed by: EMERGENCY MEDICINE

## 2021-05-18 PROCEDURE — C9803 HOPD COVID-19 SPEC COLLECT: HCPCS

## 2021-05-18 PROCEDURE — 85730 THROMBOPLASTIN TIME PARTIAL: CPT | Performed by: EMERGENCY MEDICINE

## 2021-05-18 PROCEDURE — 81001 URINALYSIS AUTO W/SCOPE: CPT | Performed by: EMERGENCY MEDICINE

## 2021-05-18 PROCEDURE — U0005 INFEC AGEN DETEC AMPLI PROBE: HCPCS | Performed by: EMERGENCY MEDICINE

## 2021-05-18 PROCEDURE — 250N000011 HC RX IP 250 OP 636: Performed by: RADIOLOGY

## 2021-05-18 PROCEDURE — 85025 COMPLETE CBC W/AUTO DIFF WBC: CPT | Performed by: EMERGENCY MEDICINE

## 2021-05-18 PROCEDURE — 87040 BLOOD CULTURE FOR BACTERIA: CPT | Mod: 59 | Performed by: EMERGENCY MEDICINE

## 2021-05-18 PROCEDURE — 87077 CULTURE AEROBIC IDENTIFY: CPT | Performed by: EMERGENCY MEDICINE

## 2021-05-18 PROCEDURE — 80053 COMPREHEN METABOLIC PANEL: CPT | Performed by: EMERGENCY MEDICINE

## 2021-05-18 PROCEDURE — 85610 PROTHROMBIN TIME: CPT | Performed by: EMERGENCY MEDICINE

## 2021-05-18 PROCEDURE — 82805 BLOOD GASES W/O2 SATURATION: CPT | Performed by: EMERGENCY MEDICINE

## 2021-05-18 PROCEDURE — 96365 THER/PROPH/DIAG IV INF INIT: CPT | Mod: XU

## 2021-05-18 PROCEDURE — 83880 ASSAY OF NATRIURETIC PEPTIDE: CPT | Performed by: EMERGENCY MEDICINE

## 2021-05-18 PROCEDURE — 36415 COLL VENOUS BLD VENIPUNCTURE: CPT | Performed by: EMERGENCY MEDICINE

## 2021-05-18 PROCEDURE — 87186 SC STD MICRODIL/AGAR DIL: CPT | Performed by: EMERGENCY MEDICINE

## 2021-05-18 PROCEDURE — 99285 EMERGENCY DEPT VISIT HI MDM: CPT | Mod: 25

## 2021-05-18 RX ORDER — ACETAMINOPHEN 325 MG/1
975 TABLET ORAL EVERY 6 HOURS PRN
COMMUNITY

## 2021-05-18 RX ORDER — IOPAMIDOL 755 MG/ML
100 INJECTION, SOLUTION INTRAVASCULAR ONCE
Status: COMPLETED | OUTPATIENT
Start: 2021-05-18 | End: 2021-05-18

## 2021-05-18 RX ORDER — LEVOFLOXACIN 5 MG/ML
750 INJECTION, SOLUTION INTRAVENOUS ONCE
Status: COMPLETED | OUTPATIENT
Start: 2021-05-18 | End: 2021-05-18

## 2021-05-18 RX ADMIN — SODIUM CHLORIDE 500 ML: 9 INJECTION, SOLUTION INTRAVENOUS at 12:29

## 2021-05-18 RX ADMIN — METRONIDAZOLE 500 MG: 500 INJECTION, SOLUTION INTRAVENOUS at 10:38

## 2021-05-18 RX ADMIN — IOPAMIDOL 100 ML: 755 INJECTION, SOLUTION INTRAVENOUS at 11:53

## 2021-05-18 RX ADMIN — LEVOFLOXACIN 750 MG: 5 INJECTION, SOLUTION INTRAVENOUS at 10:38

## 2021-05-18 RX ADMIN — SODIUM CHLORIDE 500 ML: 9 INJECTION, SOLUTION INTRAVENOUS at 10:44

## 2021-05-18 NOTE — ED NOTES
Spoke with girlfriend Alexus, she clarified that she was positive for covid 2 weeks ago and has since finished her quarantine. Also updated her that pt's covid is still pending and if he is positive she will not be able to visit.

## 2021-05-18 NOTE — ED TRIAGE NOTES
Pt not feeing well for the past 4 days. Pt noticed chest pain 4 days ago and then fevers and shakes since then. Called EMS today for SOB, pt had fever this morning ad took tylenol at 0900, also took 3 nitros which didn't seem to hep SB. Pt doesn't normally wear supplemental oxygen but is 88% on room air today, 93% on 2pm.

## 2021-05-18 NOTE — ED NOTES
DATE:  5/18/2021   TIME OF RECEIPT FROM LAB:  1012  LAB TEST:  Lactic acid  LAB VALUE:  5.1  RESULTS GIVEN WITH READ-BACK TO (PROVIDER):  Quincy Sanderson, DO  TIME LAB VALUE REPORTED TO PROVIDER:   1012

## 2021-05-18 NOTE — ED PROVIDER NOTES
EMERGENCY DEPARTMENT ENCOUNTER      NAME: Kary Moody  AGE: 87 year old female  YOB: 1933  MRN: [id number]  EVALUATION DATE & TIME: 5/18/2021  9:30 AM    PCP: Khadra Tariq    ED PROVIDER: Quincy Sanderson D.O.      Chief Complaint   Patient presents with     Shortness of Breath         HPI    Patient information was obtained from: patient       Kary Moody is a 87 year old female who presents with complaint of low mid chest pain.  Patient does report that she has had similar symptoms in the past when she has had a bile duct obstruction, which she has had in the past multiple times since her cholecystectomy..  She does report a fever today, and a single episode of vomiting.  She denies any significant shortness of breath.  She denies lightheadedness.  She denies cough, congestion, sore throat, diarrhea, dysuria, hematuria, numbness, tingling, rashes, body aches.  She does report she has completed her entire series of the Covid vaccine.  She denies additional complaints at this time.      REVIEW OF SYSTEMS  Constitutional:  Denies weight loss or weakness, reports fever  Eyes:  No pain, discharge, redness  HENT:  Denies sore throat, ear pain, congestion  Respiratory: No SOB, wheeze or cough  Cardiovascular:  No palpitations, reports chest pain  GI:  Denies abdominal pain, nausea, vomiting, diarrhea  : Denies dysuria, hematuria  Musculoskeletal:  Denies any new muscle/joint pain, swelling or loss of function.  Skin:  Denies rash, pallor  Neurologic:  Denies headache, focal weakness or sensory changes  Lymph: Denies swollen nodes    All other systems negative unless noted in HPI.    PAST MEDICAL HISTORY:  No past medical history on file.    PAST SURGICAL HISTORY:  Past Surgical History:   Procedure Laterality Date     CV CORONARY ANGIOGRAM N/A 12/21/2020    Procedure: Coronary Angiogram;  Surgeon: Benjie Betancourt MD;  Location:  [facility]     CV RIGHT HEART CATH  MEASUREMENTS RECORDED N/A 12/21/2020    Procedure: Heart Catheterization with Possible Intervention;  Surgeon: Benjie Betancourt MD;  Location:  [facility]         CURRENT MEDICATIONS:    Current Facility-Administered Medications   Medication     sodium chloride (PF) 0.9% PF flush 3 mL     sodium chloride (PF) 0.9% PF flush 3 mL     Current Outpatient Medications   Medication     acetaminophen (TYLENOL) 325 MG tablet     amLODIPine (NORVASC) 5 MG tablet     aspirin 81 MG tablet     clopidogrel (PLAVIX) 75 MG tablet     Cyanocobalamin 500 MCG TBDP     folic acid (FOLVITE) 1 MG tablet     metoprolol tartrate (LOPRESSOR) 50 MG tablet     nitroGLYcerin (NITROSTAT) 0.4 MG sublingual tablet     polyethylene glycol (MIRALAX/GLYCOLAX) Packet     rosuvastatin (CRESTOR) 40 MG tablet     thyroid (ARMOUR) 60 MG tablet     vitamin D3 (CHOLECALCIFEROL) 10 MCG (400 UNIT) capsule         ALLERGIES:  Allergies   Allergen Reactions     Atorvastatin      Celebrex [Celecoxib] Nausea and Vomiting     Codeine      Codeine Phosphate      Flu Virus Vaccine      Ibuprofen      Ibuprofen Sodium      Latex Hives     Lisinopril Cough     Penicillins      Welchol [Colesevelam]      Niaspan [Niacin] Rash     Upset Stomach       FAMILY HISTORY:  No family history on file.    SOCIAL HISTORY:  Social History     Socioeconomic History     Marital status:      Spouse name: Not on file     Number of children: Not on file     Years of education: Not on file     Highest education level: Not on file   Occupational History     Not on file   Social Needs     Financial resource strain: Not on file     Food insecurity     Worry: Not on file     Inability: Not on file     Transportation needs     Medical: Not on file     Non-medical: Not on file   Tobacco Use     Smoking status: Never Smoker     Smokeless tobacco: Never Used   Substance and Sexual Activity     Alcohol use: No     Drug use: No     Sexual activity: Not on file    Lifestyle     Physical activity     Days per week: Not on file     Minutes per session: Not on file     Stress: Not on file   Relationships     Social connections     Talks on phone: Not on file     Gets together: Not on file     Attends Taoism service: Not on file     Active member of club or organization: Not on file     Attends meetings of clubs or organizations: Not on file     Relationship status: Not on file     Intimate partner violence     Fear of current or ex partner: Not on file     Emotionally abused: Not on file     Physically abused: Not on file     Forced sexual activity: Not on file   Other Topics Concern     Not on file   Social History Narrative     Not on file       VITALS:  Patient Vitals for the past 24 hrs:   BP Temp Temp src Pulse Resp SpO2 Weight   05/18/21 1215 121/63 99.6  F (37.6  C) Tympanic 95 -- 97 % --   05/18/21 1200 137/63 -- -- 88 -- 95 % --   05/18/21 1115 101/65 -- -- 87 (!) 35 94 % --   05/18/21 1100 127/62 (!) 102.5  F (39.2  C) Tympanic 84 (!) 28 93 % --   05/18/21 1045 116/77 -- -- 85 19 96 % --   05/18/21 1030 108/55 -- -- 86 (!) 31 93 % --   05/18/21 1015 157/78 -- -- 87 (!) 32 93 % --   05/18/21 1000 137/81 (!) 102.8  F (39.3  C) Oral 71 19 94 % --   05/18/21 0945 158/90 -- -- 74 (!) 30 (!) 91 % --   05/18/21 0943 158/90 -- -- 74 22 93 % 74.8 kg (165 lb)       PHYSICAL EXAM    VITAL SIGNS: /63   Pulse 95   Temp 99.6  F (37.6  C) (Tympanic)   Resp (!) 35   Wt 74.8 kg (165 lb)   SpO2 97%   BMI 30.18 kg/m      General Appearance: Well-appearing, well-nourished, no acute distress   Head:  Normocephalic, without obvious abnormality, atraumatic  Eyes:  PERRL, conjunctiva/corneas clear, EOM's intact,  ENT:  Lips, mucosa, and tongue normal, membranes are moist without pallor  Neck:  Normal ROM, symmetrical, trachea midline    Cardio:  Regular rate and rhythm, no murmur, rub or gallop, 2+ pulses symmetric in all extremities  Pulm:  Clear to auscultation bilaterally,  respirations unlabored,  Abdomen:  Soft, non-tender, no rebound or guarding.  Musculoskeletal: Full ROM, no edema, no cyanosis, good ROM of major joints  Integument:  Warm, Dry, No erythema, No rash.    Neurologic:  Alert & oriented.  No focal deficits appreciated.  Ambulatory.  Psychiatric:  Affect normal, Judgment normal, Mood normal.      LABS  Results for orders placed or performed during the hospital encounter of 05/18/21 (from the past 24 hour(s))   CBC with platelets differential   Result Value Ref Range    WBC 18.0 (H) 4.0 - 11.0 10e9/L    RBC Count 3.83 3.8 - 5.2 10e12/L    Hemoglobin 10.8 (L) 11.7 - 15.7 g/dL    Hematocrit 32.0 (L) 35.0 - 47.0 %    MCV 84 78 - 100 fl    MCH 28.2 26.5 - 33.0 pg    MCHC 33.8 31.5 - 36.5 g/dL    RDW 15.2 (H) 10.0 - 15.0 %    Platelet Count 204 150 - 450 10e9/L    Diff Method Automated Method     % Neutrophils 94.2 %    % Lymphocytes 2.1 %    % Monocytes 1.2 %    % Eosinophils 0.2 %    % Basophils 0.2 %    % Immature Granulocytes 2.1 %    Nucleated RBCs 0 0 /100    Absolute Neutrophil 17.0 (H) 1.6 - 8.3 10e9/L    Absolute Lymphocytes 0.4 (L) 0.8 - 5.3 10e9/L    Absolute Monocytes 0.2 0.0 - 1.3 10e9/L    Absolute Eosinophils 0.0 0.0 - 0.7 10e9/L    Absolute Basophils 0.0 0.0 - 0.2 10e9/L    Abs Immature Granulocytes 0.4 0 - 0.4 10e9/L    Absolute Nucleated RBC 0.0    Comprehensive metabolic panel   Result Value Ref Range    Sodium 132 (L) 133 - 144 mmol/L    Potassium 3.6 3.4 - 5.3 mmol/L    Chloride 101 94 - 109 mmol/L    Carbon Dioxide 17 (L) 20 - 32 mmol/L    Anion Gap 14 3 - 14 mmol/L    Glucose 168 (H) 70 - 99 mg/dL    Urea Nitrogen 18 7 - 30 mg/dL    Creatinine 0.73 0.52 - 1.04 mg/dL    GFR Estimate 74 >60 mL/min/[1.73_m2]    GFR Estimate If Black 85 >60 mL/min/[1.73_m2]    Calcium 8.8 8.5 - 10.1 mg/dL    Bilirubin Total 7.0 (H) 0.2 - 1.3 mg/dL    Albumin 2.6 (L) 3.4 - 5.0 g/dL    Protein Total 6.4 (L) 6.8 - 8.8 g/dL    Alkaline Phosphatase 882 (H) 40 - 150 U/L      (H) 0 - 50 U/L     (H) 0 - 45 U/L   Lactic acid whole blood   Result Value Ref Range    Lactic Acid 5.1 (HH) 0.7 - 2.0 mmol/L   Blood gas venous and oxyhgb   Result Value Ref Range    Ph Venous 7.36 7.32 - 7.43 pH    PCO2 Venous 32 (L) 40 - 50 mm Hg    PO2 Venous 46 25 - 47 mm Hg    Bicarbonate Venous 18 (L) 21 - 28 mmol/L    FIO2 21     Oxyhemoglobin Venous 79 %    Base Deficit Venous 6.7 mmol/L   INR   Result Value Ref Range    INR 1.19 (H) 0.86 - 1.14   Partial thromboplastin time   Result Value Ref Range    PTT 31 22 - 37 sec   Troponin I   Result Value Ref Range    Troponin I ES 0.077 (H) 0.000 - 0.045 ug/L   NT pro BNP   Result Value Ref Range    N-Terminal Pro BNP Inpatient 4,781 (H) 0 - 1,800 pg/mL   Symptomatic SARS-CoV-2 COVID-19 Virus (Coronavirus) by PCR    Specimen: Nasopharyngeal   Result Value Ref Range    SARS-CoV-2 Virus Specimen Source Nasopharyngeal     SARS-CoV-2 PCR Result NEGATIVE     SARS-CoV-2 PCR Comment       Testing was performed using the Xpert Xpress SARS-CoV-2 Assay on the Cepheid Gene-Xpert   Instrument Systems. Additional information about this Emergency Use Authorization (EUA)   assay can be found via the Lab Guide.     CT Chest/Abdomen/Pelvis w Contrast    Narrative    PROCEDURE: CT CHEST/ABDOMEN/PELVIS W CONTRAST 5/18/2021 11:58 AM    HISTORY: Short of breath, vomiting and fever    COMPARISONS: December 2020    Meds/Dose Given: Isovue 300 100mL    TECHNIQUE: CT scan of the chest abdomen and pelvis with IV contrast  sagittal and coronal reconstructions were obtained.    FINDINGS: CT chest: There is some linear opacities in both lungs  representing subsegmental atelectasis or scarring. The hilar and  mediastinal normal in caliber. Heavy coronary artery calcification.  The heart is normal in size. Degenerative changes are present in the  thoracic spine.    CT scan of the abdomen and pelvis: There is markedly dilated bile  ducts which have enlarged from previous examination.  There is a  biliary stent which extends downward to the distal common duct. Gas is  seen within the biliary tree. There is some debris seen in the common  bile duct adjacent to the biliary stent.    The spleen and pancreas appear normal.    The adrenal glands are normal.    There are benign appearing cysts seen in both kidneys. The periaortic  lymph nodes are normal in caliber.    No intraperitoneal masses or inflammatory changes are noted. The  bladder and rectum are normal. There is a hip prosthesis in place on  the right. Degenerative changes are present in the lumbar spine and  sacroiliac joints.         Impression    IMPRESSION: Increasing dilation of the biliary tree as compared to  previous examination in December 2020.     TOMER CM MD   UA with Microscopic reflex to Culture    Specimen: Midstream Urine   Result Value Ref Range    Color Urine Dark Yellow     Appearance Urine Slightly Cloudy     Glucose Urine 30 (A) NEG^Negative mg/dL    Bilirubin Urine Large (A) NEG^Negative    Ketones Urine Negative NEG^Negative mg/dL    Specific Gravity Urine >1.050 (H) 1.003 - 1.035    Blood Urine Small (A) NEG^Negative    pH Urine 6.0 4.7 - 8.0 pH    Protein Albumin Urine 300 (A) NEG^Negative mg/dL    Urobilinogen mg/dL Normal 0.0 - 2.0 mg/dL    Nitrite Urine Negative NEG^Negative    Leukocyte Esterase Urine Negative NEG^Negative    Source Midstream Urine     WBC Urine 10 (H) 0 - 5 /HPF    RBC Urine 5 (H) 0 - 2 /HPF    Bacteria Urine Few (A) NEG^Negative /HPF    Squamous Epithelial /HPF Urine 1 0 - 1 /HPF    Mucous Urine Present (A) NEG^Negative /LPF    Hyaline Casts 3 (A) OTO2^O - 2 /LPF    Granular Casts 12 (A) NEG^Negative /LPF         RADIOLOGY  CT Chest/Abdomen/Pelvis w Contrast   Final Result   IMPRESSION: Increasing dilation of the biliary tree as compared to   previous examination in December 2020.       TOMER CM MD             EKG:    Rate: 75bpm  Rhythm: Normal Sinus Rhythm  Axis:  Normal  Interval: Normal  Conduction: abnormal P-wave axis  QRS: Normal  ST: Normal  T-wave: Normal  QT: Not prolonged  Comparison EKG: No significant change compared to 20 December 2020  Impression:  No acute ischemic change   I have independently reviewed and interpreted today's EKG, pending Cardiologist read      FINAL IMPRESSION:  1. Acute cholangitis    2. Acute congestive heart failure, unspecified heart failure type (H)    3. Sepsis, due to unspecified organism, unspecified whether acute organ dysfunction present (H)    4. Elevated troponin          ED COURSE & MEDICAL DECISION MAKING:    10:22 AM I met with the patient to gather history and to perform my initial exam. I discussed the plan for care while in the Emergency Department.  10:26 AM notified of elevated lactate, patient is already seeing IV fluids, will start on antibiotics, blood cultures already ordered.  11:17 AM spoke with Dr. Cantu, [facility], for transfer  12:16 PM spoke again with Dr. Cantu at [facility] about CT results, they also requested she complete the 1000cc bolus of NS.        Pertinent Labs & Imaging studies reviewed. (See chart for details)  87 year old female presents to the Emergency Department for evaluation of mid chest pain.  Patient reported this to be similar to when she has had bili obstruction in the past.  Patient was noted to be febrile as well and did have some shortness of breath.  This prompted me to perform a CTA of the chest with follow-through into the abdomen and pelvis.  There is no evidence of pulmonary embolism, however she does have increasing dilatation of the biliary tree.  Her LFTs were significantly elevated, and she did have an elevated white count and lactate.  With this I am concerned for acute cholangitis resulted from biliary obstruction causing her septic picture.  She did have some mild elevation in her troponin, as well as a significant elevation in her BNP.  I suspect  these elevations  are secondary to the stress caused by her sepsis.  Patient was transferred to [facility] for further management after she was given antibiotics and blood cultures were drawn.  [facility] did requested me give her a full liter of IV fluids despite the elevated BNP.  Patient's blood pressure was stable, she was not tachycardic while in the emergency department.  However I do suspect she will require an ERCP and further management, and therefore the plan is for transfer.  Patient was agreeable with this plan as she has had ERCPs done at [facility] in the past.    At the conclusion of the encounter I discussed the results of all of the tests and the disposition. The questions were answered. The patient or family acknowledged understanding and was agreeable with the care plan.      CRITICAL CARE:  Critical Care  Performed by: CAMRYN HOLLINGSWORTH  Authorized by: CAMRYN HOLLINGSWORTH  Total critical care time: 35 minutes  Critical care time was exclusive of separately billable procedures and treating other patients.  Critical care was necessary to treat or prevent imminent or life-threatening deterioration of the following conditions: sepsis  Critical care was time spent personally by me on the following activities: development of treatment plan with patient or surrogate, discussions with consultants, examination of patient, evaluation of patient's response to treatment, obtaining history from patient or surrogate, ordering and performing treatments and interventions, ordering and review of laboratory studies, ordering and review of radiographic studies and re-evaluation of patient's condition, this excludes any separately billable procedures.          MEDICATIONS GIVEN IN THE EMERGENCY:  Medications   sodium chloride (PF) 0.9% PF flush 3 mL (has no administration in time range)   sodium chloride (PF) 0.9% PF flush 3 mL (has no administration in time range)   levofloxacin (LEVAQUIN) infusion 750 mg (0 mg Intravenous Stopped  5/18/21 1228)   metroNIDAZOLE (FLAGYL) infusion 500 mg (0 mg Intravenous Stopped 5/18/21 1204)   sodium chloride (PF) 0.9% PF flush 100 mL (100 mLs Intravenous Given 5/18/21 1152)   iopamidol (ISOVUE-370) solution 100 mL (100 mLs Intravenous Given 5/18/21 1153)   0.9% sodium chloride BOLUS (0 mLs Intravenous Stopped 5/18/21 1123)   0.9% sodium chloride BOLUS (0 mLs Intravenous Stopped 5/18/21 1242)       NEW PRESCRIPTIONS STARTED AT TODAY'S ER VISIT  Discharge Medication List as of 5/18/2021 12:51 PM           Quincy Sanderson D.O.  Emergency Medicine  [facility]  [address] [place] [id number] [phone]  Dept: [phone]       Quincy Sanderson DO  05/18/21 4633

## 2021-05-20 LAB
BACTERIA SPEC CULT: ABNORMAL
SPECIMEN SOURCE: ABNORMAL
SPECIMEN SOURCE: ABNORMAL

## 2021-06-01 ENCOUNTER — MEDICAL CORRESPONDENCE (OUTPATIENT)
Dept: INTERVENTIONAL RADIOLOGY/VASCULAR | Facility: HOSPITAL | Age: 86
End: 2021-06-01

## 2021-07-20 ENCOUNTER — DOCUMENTATION ONLY (OUTPATIENT)
Dept: INTERVENTIONAL RADIOLOGY/VASCULAR | Facility: HOSPITAL | Age: 86
End: 2021-07-20

## 2021-07-20 NOTE — PROGRESS NOTES
Patient called regarding upcoming injection tomorrow and her plavix prescription.  Per patient, she has been off of this med for four days, tomorrow will be the fifth.  Appointment time confirmed with patient.  No other questions at this time.

## 2021-07-21 ENCOUNTER — HOSPITAL ENCOUNTER (OUTPATIENT)
Facility: HOSPITAL | Age: 86
Discharge: HOME | End: 2021-07-21
Attending: RADIOLOGY | Admitting: RADIOLOGY
Payer: MEDICARE

## 2021-07-21 ENCOUNTER — HOSPITAL ENCOUNTER (OUTPATIENT)
Dept: INTERVENTIONAL RADIOLOGY/VASCULAR | Facility: HOSPITAL | Age: 86
End: 2021-07-21
Attending: FAMILY MEDICINE
Payer: MEDICARE

## 2021-07-21 DIAGNOSIS — M25.552 PAIN IN LEFT HIP: ICD-10-CM

## 2021-07-21 PROCEDURE — 255N000002 HC RX 255 OP 636: Performed by: RADIOLOGY

## 2021-07-21 PROCEDURE — 250N000011 HC RX IP 250 OP 636: Performed by: RADIOLOGY

## 2021-07-21 PROCEDURE — 20610 DRAIN/INJ JOINT/BURSA W/O US: CPT | Mod: LT

## 2021-07-21 PROCEDURE — 250N000009 HC RX 250: Performed by: RADIOLOGY

## 2021-07-21 RX ORDER — IOPAMIDOL 612 MG/ML
50 INJECTION, SOLUTION INTRAVASCULAR ONCE
Status: COMPLETED | OUTPATIENT
Start: 2021-07-21 | End: 2021-07-21

## 2021-07-21 RX ORDER — LIDOCAINE HYDROCHLORIDE 10 MG/ML
10 INJECTION, SOLUTION EPIDURAL; INFILTRATION; INTRACAUDAL; PERINEURAL ONCE
Status: COMPLETED | OUTPATIENT
Start: 2021-07-21 | End: 2021-07-21

## 2021-07-21 RX ORDER — TRIAMCINOLONE ACETONIDE 40 MG/ML
40-80 INJECTION, SUSPENSION INTRA-ARTICULAR; INTRAMUSCULAR ONCE
Status: COMPLETED | OUTPATIENT
Start: 2021-07-21 | End: 2021-07-21

## 2021-07-21 RX ADMIN — LIDOCAINE HYDROCHLORIDE 5 ML: 10 INJECTION, SOLUTION EPIDURAL; INFILTRATION; INTRACAUDAL; PERINEURAL at 11:56

## 2021-07-21 RX ADMIN — TRIAMCINOLONE ACETONIDE 40 MG: 40 INJECTION, SUSPENSION INTRA-ARTICULAR; INTRAMUSCULAR at 11:57

## 2021-07-21 RX ADMIN — IOPAMIDOL 3 ML: 612 INJECTION, SOLUTION INTRAVENOUS at 11:57

## 2021-07-21 NOTE — DISCHARGE INSTRUCTIONS
Cell number on file:    Telephone Information:   Mobile none     Is it ok to leave a message at this number(s)? Yes    Dr Barros completed your procedure on 7/21/2021.    Current Pain Level (0-10 Scale): 7/10  Post Pain Level (0-10):  0/10    Radiology Discharge instructions for Steroid Injection    Activity Level:     Do not do any heavy activity or exercise for 24 hours.   Do not drive for 4 hours after your injection.  Diet:   Return to your normal diet.  Medications:   If you have stopped taking your Aspirin, Coumadin/Warfarin, Ibuprofen, or any   other blood thinner for this procedure you may resume in the morning unless   your primary care provider has given you other instructions.    Diabetics may see an increase in blood sugar after steroid injections. If you are concerned about your blood sugar, please contact your family doctor.    Site Care:  Remove the bandage and bathe or shower the morning after the procedure.      Please allow two weeks to experience improvement in your pain.  If you have any further issues, please contact your provider.    Call your Primary Care Provider if you have the following (if your primary care provider is not available please seek emergency care):   Nausea with vomiting   Severe headache   Drowsiness or confusion   Redness or drainage at the injection or puncture site   Temperature over 101 degrees F   Other concerns   Worsening back pain   Stiff neck

## 2023-07-08 ENCOUNTER — APPOINTMENT (OUTPATIENT)
Dept: CT IMAGING | Facility: HOSPITAL | Age: 88
End: 2023-07-08
Attending: INTERNAL MEDICINE
Payer: MEDICARE

## 2023-07-08 ENCOUNTER — APPOINTMENT (OUTPATIENT)
Dept: GENERAL RADIOLOGY | Facility: HOSPITAL | Age: 88
End: 2023-07-08
Attending: INTERNAL MEDICINE
Payer: MEDICARE

## 2023-07-08 ENCOUNTER — HOSPITAL ENCOUNTER (EMERGENCY)
Facility: HOSPITAL | Age: 88
Discharge: ANOTHER HOSPITAL | End: 2023-07-08
Attending: INTERNAL MEDICINE | Admitting: INTERNAL MEDICINE
Payer: MEDICARE

## 2023-07-08 DIAGNOSIS — I63.9 ISCHEMIC STROKE (H): ICD-10-CM

## 2023-07-08 LAB
ALBUMIN SERPL BCG-MCNC: 3.8 G/DL (ref 3.5–5.2)
ALP SERPL-CCNC: 77 U/L (ref 35–104)
ALT SERPL W P-5'-P-CCNC: 21 U/L (ref 0–50)
ANION GAP SERPL CALCULATED.3IONS-SCNC: 11 MMOL/L (ref 7–15)
AST SERPL W P-5'-P-CCNC: 30 U/L (ref 0–45)
BASOPHILS # BLD AUTO: 0.1 10E3/UL (ref 0–0.2)
BASOPHILS NFR BLD AUTO: 1 %
BILIRUB SERPL-MCNC: 0.3 MG/DL
BUN SERPL-MCNC: 22.3 MG/DL (ref 8–23)
CALCIUM SERPL-MCNC: 9.8 MG/DL (ref 8.8–10.2)
CHLORIDE SERPL-SCNC: 101 MMOL/L (ref 98–107)
CREAT SERPL-MCNC: 0.91 MG/DL (ref 0.51–0.95)
DEPRECATED HCO3 PLAS-SCNC: 25 MMOL/L (ref 22–29)
EOSINOPHIL # BLD AUTO: 0.2 10E3/UL (ref 0–0.7)
EOSINOPHIL NFR BLD AUTO: 2 %
ERYTHROCYTE [DISTWIDTH] IN BLOOD BY AUTOMATED COUNT: 13.3 % (ref 10–15)
GFR SERPL CREATININE-BSD FRML MDRD: 60 ML/MIN/1.73M2
GLUCOSE SERPL-MCNC: 162 MG/DL (ref 70–99)
HCT VFR BLD AUTO: 39.4 % (ref 35–47)
HGB BLD-MCNC: 12.9 G/DL (ref 11.7–15.7)
IMM GRANULOCYTES # BLD: 0 10E3/UL
IMM GRANULOCYTES NFR BLD: 0 %
LYMPHOCYTES # BLD AUTO: 2.2 10E3/UL (ref 0.8–5.3)
LYMPHOCYTES NFR BLD AUTO: 23 %
MCH RBC QN AUTO: 28.7 PG (ref 26.5–33)
MCHC RBC AUTO-ENTMCNC: 32.7 G/DL (ref 31.5–36.5)
MCV RBC AUTO: 88 FL (ref 78–100)
MONOCYTES # BLD AUTO: 1.6 10E3/UL (ref 0–1.3)
MONOCYTES NFR BLD AUTO: 17 %
NEUTROPHILS # BLD AUTO: 5.6 10E3/UL (ref 1.6–8.3)
NEUTROPHILS NFR BLD AUTO: 57 %
NRBC # BLD AUTO: 0 10E3/UL
NRBC BLD AUTO-RTO: 0 /100
PLATELET # BLD AUTO: 222 10E3/UL (ref 150–450)
POTASSIUM SERPL-SCNC: 3.9 MMOL/L (ref 3.4–5.3)
PROT SERPL-MCNC: 6.5 G/DL (ref 6.4–8.3)
RBC # BLD AUTO: 4.49 10E6/UL (ref 3.8–5.2)
SODIUM SERPL-SCNC: 137 MMOL/L (ref 136–145)
TROPONIN T SERPL HS-MCNC: 24 NG/L
WBC # BLD AUTO: 9.6 10E3/UL (ref 4–11)

## 2023-07-08 PROCEDURE — 93005 ELECTROCARDIOGRAM TRACING: CPT

## 2023-07-08 PROCEDURE — 70496 CT ANGIOGRAPHY HEAD: CPT | Mod: MG

## 2023-07-08 PROCEDURE — 80053 COMPREHEN METABOLIC PANEL: CPT | Performed by: INTERNAL MEDICINE

## 2023-07-08 PROCEDURE — 93010 ELECTROCARDIOGRAM REPORT: CPT | Performed by: INTERNAL MEDICINE

## 2023-07-08 PROCEDURE — 85025 COMPLETE CBC W/AUTO DIFF WBC: CPT | Performed by: INTERNAL MEDICINE

## 2023-07-08 PROCEDURE — 250N000011 HC RX IP 250 OP 636: Mod: JZ | Performed by: INTERNAL MEDICINE

## 2023-07-08 PROCEDURE — 99285 EMERGENCY DEPT VISIT HI MDM: CPT | Mod: 25

## 2023-07-08 PROCEDURE — 71045 X-RAY EXAM CHEST 1 VIEW: CPT

## 2023-07-08 PROCEDURE — G1010 CDSM STANSON: HCPCS

## 2023-07-08 PROCEDURE — 36415 COLL VENOUS BLD VENIPUNCTURE: CPT | Performed by: INTERNAL MEDICINE

## 2023-07-08 PROCEDURE — 99285 EMERGENCY DEPT VISIT HI MDM: CPT | Performed by: INTERNAL MEDICINE

## 2023-07-08 PROCEDURE — 84484 ASSAY OF TROPONIN QUANT: CPT | Performed by: INTERNAL MEDICINE

## 2023-07-08 RX ORDER — IOPAMIDOL 755 MG/ML
50 INJECTION, SOLUTION INTRAVASCULAR ONCE
Status: COMPLETED | OUTPATIENT
Start: 2023-07-08 | End: 2023-07-08

## 2023-07-08 RX ADMIN — IOPAMIDOL 50 ML: 755 INJECTION, SOLUTION INTRAVENOUS at 22:43

## 2023-07-08 ASSESSMENT — ACTIVITIES OF DAILY LIVING (ADL): ADLS_ACUITY_SCORE: 35

## 2023-07-09 VITALS
RESPIRATION RATE: 18 BRPM | HEART RATE: 65 BPM | SYSTOLIC BLOOD PRESSURE: 169 MMHG | TEMPERATURE: 97.9 F | OXYGEN SATURATION: 94 % | DIASTOLIC BLOOD PRESSURE: 90 MMHG | WEIGHT: 170 LBS | BODY MASS INDEX: 31.09 KG/M2

## 2023-07-09 ASSESSMENT — ENCOUNTER SYMPTOMS
DIAPHORESIS: 0
HEMATURIA: 0
FLANK PAIN: 0
SHORTNESS OF BREATH: 0
CHEST TIGHTNESS: 0
ABDOMINAL DISTENTION: 0
PALPITATIONS: 0
NAUSEA: 0
ARTHRALGIAS: 0
NECK PAIN: 0
COUGH: 0
DIZZINESS: 0
ANAL BLEEDING: 0
FEVER: 0
NUMBNESS: 1
ABDOMINAL PAIN: 0
WOUND: 0
MYALGIAS: 0
WHEEZING: 0
DYSURIA: 0
CHILLS: 0
VOMITING: 0
CONFUSION: 0
COLOR CHANGE: 0
NECK STIFFNESS: 0
LIGHT-HEADEDNESS: 0
WEAKNESS: 1
BACK PAIN: 0
HEADACHES: 0
BLOOD IN STOOL: 0
VOICE CHANGE: 0

## 2023-07-09 NOTE — CONSULTS
"    [facility]    Stroke Consult Note    Reason for Consult: Stroke Code     Chief Complaint: Extremity Weakness      HPI  Kary Moody is a 89 year old female prior stroke, HTN, CAD, HL who presents with left sided weakness primarily in the arm, which has improved.  She notes her prior stroke also involved her left side and required 6 weeks of rehab.      While she reports symptoms primarily in her arm, her left leg is clearly weaker than the right on testing.  She is unsure if this is new or not.  When ambulating in the room, she is able to ambulate independently, but admits her left leg may be \"off\".  She remains unsure.    Imaging Findings  Head CT: no acute pathology  CTA: scattered atherosclerosis including the aortic arch, with focal stenosis of the proximal Right M2.      Intravenous Thrombolysis  Not given due to:   - minor/isolated/quickly resolving symptoms    Endovascular Treatment  Not initiated due to absence of proximal vessel occlusion    Impression   1. Probable acute ischemic stroke    Recommendations  1. Patient is taking aspirin/plavix  2. Continue to monitor--call back is symptoms worsen.  She remains eligible for thrombolysis for the next few hours  3. Admission for stroke work-up  4. Metabolic/infectious work-up for possible recrudescence of old symptoms due to separate cause such as UTI, etc.  5. PT/OT/Speech     Patient Follow-up     Pending work-up    Thank you for this consult. We will continue to follow.      Doron Olson MD, MS  Vascular Neurology    To page me or covering stroke neurology team member, click here: AMCOM  Choose \"On Call\" tab at top, then select \"NEUROLOGY/ALL SITES\" from middle drop-down box, press Enter, then look for \"stroke\" or \"telestroke\" for your site.    ______________________________________________________    Clinically Significant Risk Factors Present on Admission                # Drug Induced Platelet Defect: home medication list " includes an antiplatelet medication               Past Medical History   No past medical history on file.  Past Surgical History   Past Surgical History:   Procedure Laterality Date     CV CORONARY ANGIOGRAM N/A 12/21/2020    Procedure: Coronary Angiogram;  Surgeon: Benjie Betancourt MD;  Location:  HEART CARDIAC CATH LAB     CV RIGHT HEART CATH MEASUREMENTS RECORDED N/A 12/21/2020    Procedure: Heart Catheterization with Possible Intervention;  Surgeon: Benjie Betancourt MD;  Location:  HEART CARDIAC CATH LAB     Medications   Home Meds  Prior to Admission medications    Medication Sig Start Date End Date Taking? Authorizing Provider   acetaminophen (TYLENOL) 325 MG tablet Take 975 mg by mouth every 6 hours as needed for mild pain    Reported, Patient   amLODIPine (NORVASC) 5 MG tablet Take 5 mg by mouth daily    Reported, Patient   aspirin 81 MG tablet Take 81 mg by mouth daily    Reported, Patient   clopidogrel (PLAVIX) 75 MG tablet Take 75 mg by mouth daily     Reported, Patient   Cyanocobalamin 500 MCG TBDP Take 500 mcg by mouth daily     Reported, Patient   folic acid (FOLVITE) 1 MG tablet Take 1 mg by mouth daily     Reported, Patient   metoprolol tartrate (LOPRESSOR) 50 MG tablet Take 100 mg in the morning and 50 mg in the evening    Reported, Patient   nitroGLYcerin (NITROSTAT) 0.4 MG sublingual tablet Place 1 tablet under the tongue every 5 minutes as needed for chest pain 3/17/20   Unknown, Entered By History   polyethylene glycol (MIRALAX/GLYCOLAX) Packet Take 1 packet by mouth daily    Reported, Patient   rosuvastatin (CRESTOR) 40 MG tablet Take 40 mg by mouth daily    Unknown, Entered By History   thyroid (ARMOUR) 60 MG tablet Take 60 mg by mouth daily     Reported, Patient   vitamin D3 (CHOLECALCIFEROL) 10 MCG (400 UNIT) capsule Take 1 capsule by mouth daily    Reported, Patient       Scheduled Meds      Infusion Meds      PRN Meds      Allergies   Allergies   Allergen Reactions      Atorvastatin      Celebrex [Celecoxib] Nausea and Vomiting     Codeine      Codeine Phosphate      Ibuprofen      Ibuprofen Sodium      Influenza Virus Vaccine      Latex Hives     Lisinopril Cough     Pcn [Penicillins]      Welchol [Colesevelam]      Niaspan [Niacin] Rash     Upset Stomach     Family History   No family history on file.  Social History   Social History     Tobacco Use     Smoking status: Never     Smokeless tobacco: Never   Substance Use Topics     Alcohol use: No     Drug use: No       Review of Systems   The 10 point Review of Systems is negative other than noted in the HPI or here.        PHYSICAL EXAMINATION  Temp:  [98.4  F (36.9  C)] 98.4  F (36.9  C)  Pulse:  [74] 74  Resp:  [18] 18  BP: (185)/(94) 185/94  SpO2:  [95 %] 95 %     Please see NIHSS  Patient alert, bright, excellent historian.  She is, however, unclear if her asymmetric leg strength is new or not.  Her prior stroke had impacted her left side, but she had an excellent recovery    Dysphagia Screen  Per Nursing    Stroke Scales    NIHSS  1a. Level of Consciousness 0-->Alert, keenly responsive   1b. LOC Questions 0-->Answers both questions correctly   1c. LOC Commands 0-->Performs both tasks correctly   2.   Best Gaze 0-->Normal   3.   Visual 0-->No visual loss   4.   Facial Palsy 0-->Normal symmetrical movements   5a. Motor Arm, Left 0-->No drift, limb holds 90 (or 45) degrees for full 10 secs   5b. Motor Arm, Right 0-->No drift, limb holds 90 (or 45) degrees for full 10 secs   6a. Motor Leg, Left 1-->Drift, leg falls by the end of the 5-sec period but does not hit bed   6b. Motor Leg, right 0-->No drift, leg holds 30 degree position for full 5 secs   7.   Limb Ataxia 0-->Absent   8.   Sensory 1-->Mild-to-moderate sensory loss, patient feels pinprick is less sharp or is dull on the affected side, or there is a loss of superficial pain with pinprick, but patient is aware of being touched   9.   Best Language 0-->No aphasia, normal    10. Dysarthria 0-->Normal   11. Extinction and Inattention  0-->No abnormality   Total 2 (07/08/23 2255)       Modified [place] Score (Pre-morbid)    -      Imaging  I personally reviewed all imaging; relevant findings per HPI.     Lab Results Data   CBC  Recent Labs   Lab 07/08/23 2230   WBC 9.6   RBC 4.49   HGB 12.9   HCT 39.4        Basic Metabolic Panel    Recent Labs   Lab 07/08/23 2230      POTASSIUM 3.9   CHLORIDE 101   CO2 25   BUN 22.3   CR 0.91   *   CANDACE 9.8     Liver Panel  Recent Labs   Lab 07/08/23 2230   PROTTOTAL 6.5   ALBUMIN 3.8   BILITOTAL 0.3   ALKPHOS 77   AST 30   ALT 21     INR    Recent Labs   Lab Test 05/18/21  0955   INR 1.19*      Lipid Profile    Recent Labs   Lab Test 06/14/18  0000 05/29/16  0543   CHOL 170 197   HDL 31* 31*   LDL 99 129*   TRIG 202* 186*     A1C  No lab results found.  Troponin    Recent Labs   Lab 07/08/23 2230   CTROPT 24*          Stroke Code Data Data   Stroke Code Data  (for stroke code with tele)  Stroke code activated 07/08/23 2228   First stroke provider response 07/08/23 2229   Video start time 07/08/23 2249   Video end time 07/08/23   2311   Last known normal 07/08/23 2150   Time of discovery  (or onset of symptoms)  07/08/23 2150   Head CT read by Stroke Neuro Dr/Provider 07/08/23   2241   Was stroke code de-escalated? Yes 07/08/23 2320           Telestroke Service Details  Type of service telemedicine diagnostic assessment of acute neurological changes   Reason telemedicine is appropriate patient requires assessment with a specialist for diagnosis and treatment of neurological symptoms   Mode of transmission secure interactive audio and video communication per Tod   Originating site (patient location) [facility]    Distant site (provider location) Provider remote site       I saw Kary Moody on 07/08/23 as a STROKE CODE activation.  Kary Moody was in critical condition due to acute onset  neurologic deficits consisting of left hemiparesis due to suspected ischemic or hemorrhagic stroke--she was at high risk of neurologic deterioration from complications of stroke or stroke reperfusion therapy.  Both intravenous thrombolysis and endovascular thrombectomy were considered, but were ultimately deferred upon completion of her emergent clinical evaluation and review of her stat neuroimaging. The stroke code was de-escalated at that time.  I spent 60 minutes critical care decision-making time emergently evaluating and managing this patient's stroke code activation

## 2023-07-09 NOTE — ED NOTES
Patient and  aware and in agreement of transfer. Pt reports no changes in symptoms. Report called to receiving facility and transport crew. All questions answered, no concerns. Patient left in care of [facility] crew to [place].

## 2023-07-09 NOTE — ED PROVIDER NOTES
History     Chief Complaint   Patient presents with     Extremity Weakness     The history is provided by the patient.     Kary Moody is a 89 year old female who came for left arm weakness and numbness started all the sudden at 9: 50 pm. Pt took 2-3 nitroglycerin after symptoms started. Slight left sided weakness on left arm and left leg was noticed in ER, code stroke activated.     Allergies:  Allergies   Allergen Reactions     Atorvastatin      Celebrex [Celecoxib] Nausea and Vomiting     Codeine      Codeine Phosphate      Ibuprofen      Ibuprofen Sodium      Influenza Virus Vaccine      Latex Hives     Lisinopril Cough     Pcn [Penicillins]      Welchol [Colesevelam]      Niaspan [Niacin] Rash     Upset Stomach       Problem List:    Patient Active Problem List    Diagnosis Date Noted     Unstable angina pectoris (H) 12/20/2020     Priority: Medium     Added automatically from request for surgery [id number]       Pneumonia 08/02/2018     Priority: Medium     ST elevation myocardial infarction involving right coronary artery (H) 08/02/2018     Priority: Medium     Chest pain 05/28/2016     Priority: Medium        Past Medical History:    No past medical history on file.    Past Surgical History:    Past Surgical History:   Procedure Laterality Date     CV CORONARY ANGIOGRAM N/A 12/21/2020    Procedure: Coronary Angiogram;  Surgeon: Benjie Betancourt MD;  Location:  HEART CARDIAC CATH LAB     CV RIGHT HEART CATH MEASUREMENTS RECORDED N/A 12/21/2020    Procedure: Heart Catheterization with Possible Intervention;  Surgeon: Benjie Betancourt MD;  Location:  HEART CARDIAC CATH LAB       Family History:    No family history on file.    Social History:  Marital Status:   [2]  Social History     Tobacco Use     Smoking status: Never     Smokeless tobacco: Never   Substance Use Topics     Alcohol use: No     Drug use: No        Medications:    acetaminophen (TYLENOL) 325 MG tablet  amLODIPine  (NORVASC) 5 MG tablet  aspirin 81 MG tablet  clopidogrel (PLAVIX) 75 MG tablet  Cyanocobalamin 500 MCG TBDP  folic acid (FOLVITE) 1 MG tablet  metoprolol tartrate (LOPRESSOR) 50 MG tablet  nitroGLYcerin (NITROSTAT) 0.4 MG sublingual tablet  polyethylene glycol (MIRALAX/GLYCOLAX) Packet  rosuvastatin (CRESTOR) 40 MG tablet  thyroid (ARMOUR) 60 MG tablet  vitamin D3 (CHOLECALCIFEROL) 10 MCG (400 UNIT) capsule          Review of Systems   Constitutional: Negative for chills, diaphoresis and fever.   HENT: Negative for voice change.    Eyes: Negative for visual disturbance.   Respiratory: Negative for cough, chest tightness, shortness of breath and wheezing.    Cardiovascular: Negative for chest pain, palpitations and leg swelling.   Gastrointestinal: Negative for abdominal distention, abdominal pain, anal bleeding, blood in stool, nausea and vomiting.   Genitourinary: Negative for decreased urine volume, dysuria, flank pain and hematuria.   Musculoskeletal: Negative for arthralgias, back pain, gait problem, myalgias, neck pain and neck stiffness.   Skin: Negative for color change, pallor, rash and wound.   Neurological: Positive for weakness and numbness. Negative for dizziness, syncope, light-headedness and headaches.   Psychiatric/Behavioral: Negative for confusion and suicidal ideas.       Physical Exam   BP: (!) 185/94  Pulse: 74  Temp: 98.4  F (36.9  C)  Resp: 18  Weight: 77.1 kg (170 lb)  SpO2: 95 %      Physical Exam  Vitals and nursing note reviewed.   Constitutional:       Appearance: She is well-developed.   HENT:      Head: Normocephalic and atraumatic.      Mouth/Throat:      Pharynx: No oropharyngeal exudate.   Eyes:      Conjunctiva/sclera: Conjunctivae normal.      Pupils: Pupils are equal, round, and reactive to light.   Neck:      Thyroid: No thyromegaly.      Vascular: No JVD.      Trachea: No tracheal deviation.   Cardiovascular:      Rate and Rhythm: Normal rate and regular rhythm.      Heart  sounds: Normal heart sounds. No murmur heard.     No friction rub. No gallop.   Pulmonary:      Effort: Pulmonary effort is normal. No respiratory distress.      Breath sounds: Normal breath sounds. No stridor. No wheezing or rales.   Chest:      Chest wall: No tenderness.   Abdominal:      General: Bowel sounds are normal. There is no distension.      Palpations: Abdomen is soft. There is no mass.      Tenderness: There is no abdominal tenderness. There is no guarding or rebound.   Musculoskeletal:         General: No tenderness. Normal range of motion.      Cervical back: Normal range of motion and neck supple.   Lymphadenopathy:      Cervical: No cervical adenopathy.   Skin:     General: Skin is warm and dry.      Coloration: Skin is not pale.      Findings: No erythema or rash.   Neurological:      Mental Status: She is alert and oriented to person, place, and time.      Cranial Nerves: Cranial nerves 2-12 are intact.      Motor: Weakness present.      Coordination: Coordination is intact.   Psychiatric:         Behavior: Behavior normal.         ED Course                 Procedures           The patient has stroke symptoms:         ED Stroke specific documentation           NIHSS PDF     Patient last known well time: 21:40       Time Performed: 10:30 pm     Score    Level of consciousness: (0)   Alert, keenly responsive    LOC questions: (0)   Answers both questions correctly    LOC commands: (0)   Performs both tasks correctly    Best gaze: (0)   Normal    Visual: (0)   No visual loss    Facial palsy: (0)   Normal symmetrical movements    Motor arm (left): (1)   Drift    Motor arm (right): (0)   No drift    Motor leg (left): (1)   Drift    Motor leg (right): (0)   No drift    Limb ataxia: (0)   Absent    Sensory: (0)   Normal- no sensory loss    Best language: (0)   Normal- no aphasia    Dysarthria: (0)   Normal    Extinction and inattention: (0)   No abnormality        Total Score:  2                    Results for orders placed or performed during the hospital encounter of 07/08/23 (from the past 24 hour(s))   Comprehensive metabolic panel   Result Value Ref Range    Sodium 137 136 - 145 mmol/L    Potassium 3.9 3.4 - 5.3 mmol/L    Chloride 101 98 - 107 mmol/L    Carbon Dioxide (CO2) 25 22 - 29 mmol/L    Anion Gap 11 7 - 15 mmol/L    Urea Nitrogen 22.3 8.0 - 23.0 mg/dL    Creatinine 0.91 0.51 - 0.95 mg/dL    Calcium 9.8 8.8 - 10.2 mg/dL    Glucose 162 (H) 70 - 99 mg/dL    Alkaline Phosphatase 77 35 - 104 U/L    AST 30 0 - 45 U/L    ALT 21 0 - 50 U/L    Protein Total 6.5 6.4 - 8.3 g/dL    Albumin 3.8 3.5 - 5.2 g/dL    Bilirubin Total 0.3 <=1.2 mg/dL    GFR Estimate 60 (L) >60 mL/min/1.73m2   CBC with Platelets & Differential    Narrative    The following orders were created for panel order CBC with Platelets & Differential.  Procedure                               Abnormality         Status                     ---------                               -----------         ------                     CBC with platelets and d...[[id number]]  Abnormal            Final result                 Please view results for these tests on the individual orders.   Troponin T, High Sensitivity   Result Value Ref Range    Troponin T, High Sensitivity 24 (H) <=14 ng/L   CBC with platelets and differential   Result Value Ref Range    WBC Count 9.6 4.0 - 11.0 10e3/uL    RBC Count 4.49 3.80 - 5.20 10e6/uL    Hemoglobin 12.9 11.7 - 15.7 g/dL    Hematocrit 39.4 35.0 - 47.0 %    MCV 88 78 - 100 fL    MCH 28.7 26.5 - 33.0 pg    MCHC 32.7 31.5 - 36.5 g/dL    RDW 13.3 10.0 - 15.0 %    Platelet Count 222 150 - 450 10e3/uL    % Neutrophils 57 %    % Lymphocytes 23 %    % Monocytes 17 %    % Eosinophils 2 %    % Basophils 1 %    % Immature Granulocytes 0 %    NRBCs per 100 WBC 0 <1 /100    Absolute Neutrophils 5.6 1.6 - 8.3 10e3/uL    Absolute Lymphocytes 2.2 0.8 - 5.3 10e3/uL    Absolute Monocytes 1.6 (H) 0.0 - 1.3 10e3/uL    Absolute  Eosinophils 0.2 0.0 - 0.7 10e3/uL    Absolute Basophils 0.1 0.0 - 0.2 10e3/uL    Absolute Immature Granulocytes 0.0 <=0.4 10e3/uL    Absolute NRBCs 0.0 10e3/uL       Medications   iopamidol (ISOVUE-370) solution 50 mL (50 mLs Intravenous $Given 7/8/23 2243)   sodium chloride (PF) 0.9% PF flush 100 mL (50 mLs Intravenous $Given 7/8/23 2243)       Assessments & Plan (with Medical Decision Making)   Sudden left sided weakness    Stroke neurology recommended not using TNK  As symptoms rapidly improved by the time stroke neurology examined patient( Tele stroke) , pt was able to walk in ER room  No bed available in our hospital and no ground transportion available due to street dancing  Recommended admission for close neuro check and MRI  I spoke to [facility], [facility] for transfer,     I have reviewed the nursing notes.    I have reviewed the findings, diagnosis, plan and need for follow up with the patient.        New Prescriptions    No medications on file       Final diagnoses:   Ischemic stroke (H)       7/8/2023   [facility]     Ayan Mccoy MD  07/09/23 0025

## 2023-07-09 NOTE — ED TRIAGE NOTES
"\"At about 2150 my left arm got numb and the fingers in my left hand are now tingly.  Also having left arm weakness.\"  Speech is clear.  Left hand grasp is weaker than the right.  Sitting in the chair lifting the left leg is lower than the right.  \"Took 3 nitros on the way to the hospital.\"  No facial droop.      "

## 2023-07-09 NOTE — DISCHARGE INSTRUCTIONS

## 2023-07-09 NOTE — ED NOTES
STROKE CODE ARRIVAL NOTE  Arrived to ER via walking with c/o left sided weakness, tingling reported by patient .  Last known well 2150  Tier 1 Code Stroke activated   Point of Care glucose 162 (per BMP)  See Neurological narrator for initial and ongoing 15 minute neurological checks

## 2023-11-20 ENCOUNTER — MEDICAL CORRESPONDENCE (OUTPATIENT)
Dept: INTERVENTIONAL RADIOLOGY/VASCULAR | Facility: HOSPITAL | Age: 88
End: 2023-11-20

## 2023-11-20 ENCOUNTER — TELEPHONE (OUTPATIENT)
Dept: INTERVENTIONAL RADIOLOGY/VASCULAR | Facility: HOSPITAL | Age: 88
End: 2023-11-20

## 2023-11-20 NOTE — PROVIDER NOTIFICATION
Pt scheduled for lumbar injection on 12/01/2023. Pt on daily aspirin and Plavix. Pt instructed she can keep taking the daily aspirin but to hold the Plavix after 11/25/2023 until after the injection. Pt verbalized understanding.

## 2023-11-28 ENCOUNTER — TELEPHONE (OUTPATIENT)
Dept: INTERVENTIONAL RADIOLOGY/VASCULAR | Facility: HOSPITAL | Age: 88
End: 2023-11-28

## 2023-11-28 NOTE — TELEPHONE ENCOUNTER
Left a message to remind patient of their yusuf on 12/1. Also reminded patient to not take any antibiotics, steroids, or immunizations two weeks before and after this appt. And they need a .       SAKSHI GORDON

## 2023-12-01 ENCOUNTER — HOSPITAL ENCOUNTER (OUTPATIENT)
Dept: GENERAL RADIOLOGY | Facility: HOSPITAL | Age: 88
Discharge: HOME | End: 2023-12-01
Attending: FAMILY MEDICINE | Admitting: RADIOLOGY
Payer: MEDICARE

## 2023-12-01 ENCOUNTER — HOSPITAL ENCOUNTER (OUTPATIENT)
Facility: HOSPITAL | Age: 88
Discharge: HOME | End: 2023-12-01
Attending: RADIOLOGY | Admitting: RADIOLOGY
Payer: MEDICARE

## 2023-12-01 DIAGNOSIS — M43.16 SPONDYLOLISTHESIS OF LUMBAR REGION: ICD-10-CM

## 2023-12-01 DIAGNOSIS — M47.26 OTHER SPONDYLOSIS WITH RADICULOPATHY, LUMBAR REGION: ICD-10-CM

## 2023-12-01 PROCEDURE — 250N000011 HC RX IP 250 OP 636: Mod: JZ | Performed by: RADIOLOGY

## 2023-12-01 PROCEDURE — 272N000472 XR LUMBAR TRANSLAMINAR INJ INCL IMAGING

## 2023-12-01 RX ORDER — DEXAMETHASONE SODIUM PHOSPHATE 10 MG/ML
10 INJECTION, SOLUTION INTRAMUSCULAR; INTRAVENOUS ONCE
Status: COMPLETED | OUTPATIENT
Start: 2023-12-01 | End: 2023-12-01

## 2023-12-01 RX ORDER — IOPAMIDOL 612 MG/ML
15 INJECTION, SOLUTION INTRATHECAL ONCE
Status: COMPLETED | OUTPATIENT
Start: 2023-12-01 | End: 2023-12-01

## 2023-12-01 RX ADMIN — IOPAMIDOL 3 ML: 612 INJECTION, SOLUTION INTRATHECAL at 14:00

## 2023-12-01 RX ADMIN — DEXAMETHASONE SODIUM PHOSPHATE 10 MG: 10 INJECTION, SOLUTION INTRAMUSCULAR; INTRAVENOUS at 14:00

## 2023-12-01 ASSESSMENT — ACTIVITIES OF DAILY LIVING (ADL): ADLS_ACUITY_SCORE: 35

## 2023-12-01 NOTE — DISCHARGE INSTRUCTIONS
Cell number on file:    Telephone Information:   Mobile none     Is it ok to leave a message at this number(s)? Yes    RICARDO completed your procedure on 12/1/2023.    Current Pain Level (0-10 Scale): 9/10  Post Pain Level (0-10):  0/10    Radiology Discharge instructions for Steroid Injection    Activity Level:     Do not do any heavy activity or exercise for 24 hours.   Do not drive for 4 hours after your injection.  Diet:   Return to your normal diet.  Medications:   If you have stopped taking your Aspirin, Coumadin/Warfarin, Ibuprofen, or any   other blood thinner for this procedure you may resume in the morning unless   your primary care provider has given you other instructions.    Diabetics may see an increase in blood sugar after steroid injections. If you are concerned about your blood sugar, please contact your family doctor.    Site Care:  Remove the bandage and bathe or shower the morning after the procedure.      Please allow two weeks to experience improvement in your pain.  If you have any further issues, please contact your provider.    Call your Primary Care Provider if you have the following (if your primary care provider is not available please seek emergency care):   Nausea with vomiting   Severe headache   Drowsiness or confusion   Redness or drainage at the injection or puncture site   Temperature over 101 degrees F   Other concerns   Worsening back pain   Stiff neck

## 2023-12-14 ENCOUNTER — TELEPHONE (OUTPATIENT)
Dept: INTERVENTIONAL RADIOLOGY/VASCULAR | Facility: HOSPITAL | Age: 88
End: 2023-12-14

## 2023-12-14 NOTE — TELEPHONE ENCOUNTER
INJECTION POST CALL    Procedure: Epidural TL L4-5  Radiologist(s): DR. JOSE SILVA  Date of Procedure:  12/1/23    Relief of pain from this injection    A = 90%  A- = 85%  B = 80%  B- =75%  C = 70%  C- = 65%  D = 60%  D- = 50%  F = less than 50%    Do you feel this injection was beneficial? No not really, she got 45% of relief    If yes, how long did it last? It lasted only for one week. Patient went back to PCP is on prednisone now    Instruct patient to follow up with their provider for any further care they may need. (as Dr. Partida will no longer be making recommendations nor addended the exam with recommmendations)    Ashley Ibanez

## 2024-07-26 ENCOUNTER — APPOINTMENT (OUTPATIENT)
Dept: ULTRASOUND IMAGING | Facility: HOSPITAL | Age: 89
End: 2024-07-26
Attending: NURSE PRACTITIONER
Payer: MEDICARE

## 2024-07-26 ENCOUNTER — HOSPITAL ENCOUNTER (EMERGENCY)
Facility: HOSPITAL | Age: 89
Discharge: HOME | End: 2024-07-26
Attending: NURSE PRACTITIONER | Admitting: NURSE PRACTITIONER
Payer: MEDICARE

## 2024-07-26 ENCOUNTER — APPOINTMENT (OUTPATIENT)
Dept: GENERAL RADIOLOGY | Facility: HOSPITAL | Age: 89
End: 2024-07-26
Attending: NURSE PRACTITIONER
Payer: MEDICARE

## 2024-07-26 VITALS
OXYGEN SATURATION: 93 % | SYSTOLIC BLOOD PRESSURE: 157 MMHG | BODY MASS INDEX: 32.28 KG/M2 | DIASTOLIC BLOOD PRESSURE: 79 MMHG | WEIGHT: 176.5 LBS | RESPIRATION RATE: 18 BRPM | HEART RATE: 80 BPM | TEMPERATURE: 97.1 F

## 2024-07-26 DIAGNOSIS — M71.121 SEPTIC BURSITIS OF ELBOW, RIGHT: ICD-10-CM

## 2024-07-26 DIAGNOSIS — L03.113 CELLULITIS OF RIGHT UPPER EXTREMITY: ICD-10-CM

## 2024-07-26 PROBLEM — I63.9 CVA (CEREBRAL VASCULAR ACCIDENT) (H): Status: ACTIVE | Noted: 2023-07-09

## 2024-07-26 PROBLEM — I25.10 CORONARY ATHEROSCLEROSIS: Status: ACTIVE | Noted: 2023-07-09

## 2024-07-26 LAB
ANION GAP SERPL CALCULATED.3IONS-SCNC: 11 MMOL/L (ref 7–15)
BASOPHILS # BLD MANUAL: 0 10E3/UL (ref 0–0.2)
BASOPHILS NFR BLD MANUAL: 0 %
BUN SERPL-MCNC: 20.6 MG/DL (ref 8–23)
CALCIUM SERPL-MCNC: 10.1 MG/DL (ref 8.8–10.4)
CHLORIDE SERPL-SCNC: 102 MMOL/L (ref 98–107)
CREAT SERPL-MCNC: 0.94 MG/DL (ref 0.51–0.95)
CRP SERPL-MCNC: 56.31 MG/L
EGFRCR SERPLBLD CKD-EPI 2021: 57 ML/MIN/1.73M2
EOSINOPHIL # BLD MANUAL: 0.1 10E3/UL (ref 0–0.7)
EOSINOPHIL NFR BLD MANUAL: 1 %
ERYTHROCYTE [DISTWIDTH] IN BLOOD BY AUTOMATED COUNT: 13.6 % (ref 10–15)
GLUCOSE SERPL-MCNC: 137 MG/DL (ref 70–99)
HCO3 SERPL-SCNC: 26 MMOL/L (ref 22–29)
HCT VFR BLD AUTO: 40.3 % (ref 35–47)
HGB BLD-MCNC: 13.2 G/DL (ref 11.7–15.7)
HOLD SPECIMEN: NORMAL
LACTATE SERPL-SCNC: 1.1 MMOL/L (ref 0.7–2)
LYMPHOCYTES # BLD MANUAL: 1.3 10E3/UL (ref 0.8–5.3)
LYMPHOCYTES NFR BLD MANUAL: 10 %
MCH RBC QN AUTO: 29.8 PG (ref 26.5–33)
MCHC RBC AUTO-ENTMCNC: 32.8 G/DL (ref 31.5–36.5)
MCV RBC AUTO: 91 FL (ref 78–100)
MONOCYTES # BLD MANUAL: 2.1 10E3/UL (ref 0–1.3)
MONOCYTES NFR BLD MANUAL: 16 %
NEUTROPHILS # BLD MANUAL: 9.6 10E3/UL (ref 1.6–8.3)
NEUTROPHILS NFR BLD MANUAL: 73 %
NRBC # BLD AUTO: 0 10E3/UL
NRBC BLD AUTO-RTO: 0 /100
PLAT MORPH BLD: ABNORMAL
PLATELET # BLD AUTO: 189 10E3/UL (ref 150–450)
POTASSIUM SERPL-SCNC: 3.9 MMOL/L (ref 3.4–5.3)
RBC # BLD AUTO: 4.43 10E6/UL (ref 3.8–5.2)
RBC MORPH BLD: ABNORMAL
SODIUM SERPL-SCNC: 139 MMOL/L (ref 135–145)
WBC # BLD AUTO: 13.1 10E3/UL (ref 4–11)

## 2024-07-26 PROCEDURE — 99284 EMERGENCY DEPT VISIT MOD MDM: CPT | Mod: 25 | Performed by: NURSE PRACTITIONER

## 2024-07-26 PROCEDURE — 83605 ASSAY OF LACTIC ACID: CPT | Performed by: STUDENT IN AN ORGANIZED HEALTH CARE EDUCATION/TRAINING PROGRAM

## 2024-07-26 PROCEDURE — 76882 US LMTD JT/FCL EVL NVASC XTR: CPT | Mod: 26 | Performed by: NURSE PRACTITIONER

## 2024-07-26 PROCEDURE — 86140 C-REACTIVE PROTEIN: CPT | Performed by: NURSE PRACTITIONER

## 2024-07-26 PROCEDURE — 73070 X-RAY EXAM OF ELBOW: CPT | Mod: RT

## 2024-07-26 PROCEDURE — 85007 BL SMEAR W/DIFF WBC COUNT: CPT | Performed by: NURSE PRACTITIONER

## 2024-07-26 PROCEDURE — 83605 ASSAY OF LACTIC ACID: CPT | Performed by: NURSE PRACTITIONER

## 2024-07-26 PROCEDURE — 85027 COMPLETE CBC AUTOMATED: CPT | Performed by: NURSE PRACTITIONER

## 2024-07-26 PROCEDURE — 36415 COLL VENOUS BLD VENIPUNCTURE: CPT | Performed by: STUDENT IN AN ORGANIZED HEALTH CARE EDUCATION/TRAINING PROGRAM

## 2024-07-26 PROCEDURE — 99284 EMERGENCY DEPT VISIT MOD MDM: CPT | Mod: 25

## 2024-07-26 PROCEDURE — 76882 US LMTD JT/FCL EVL NVASC XTR: CPT | Mod: TC,RT

## 2024-07-26 PROCEDURE — 82565 ASSAY OF CREATININE: CPT | Performed by: NURSE PRACTITIONER

## 2024-07-26 RX ORDER — CLINDAMYCIN HCL 150 MG
300 CAPSULE ORAL 3 TIMES DAILY
Qty: 84 CAPSULE | Refills: 0 | Status: SHIPPED | OUTPATIENT
Start: 2024-07-26 | End: 2024-08-09

## 2024-07-26 ASSESSMENT — ENCOUNTER SYMPTOMS
NEUROLOGICAL NEGATIVE: 1
GASTROINTESTINAL NEGATIVE: 1
PSYCHIATRIC NEGATIVE: 1
RESPIRATORY NEGATIVE: 1
HEMATOLOGIC/LYMPHATIC NEGATIVE: 1
COLOR CHANGE: 1
ENDOCRINE NEGATIVE: 1
CARDIOVASCULAR NEGATIVE: 1
EYES NEGATIVE: 1
CONSTITUTIONAL NEGATIVE: 1
ALLERGIC/IMMUNOLOGIC NEGATIVE: 1
JOINT SWELLING: 1

## 2024-07-26 ASSESSMENT — ACTIVITIES OF DAILY LIVING (ADL): ADLS_ACUITY_SCORE: 36

## 2024-07-26 NOTE — ED TRIAGE NOTES
Patient presents c/o right elbow pain and swelling. States was in clinic and was sent here. Area red and warm. Denies injury. Sx started 2 days ago. Denies fevers, NVD.

## 2024-07-26 NOTE — ED PROVIDER NOTES
History     Chief Complaint   Patient presents with    Joint Swelling     HPI  Kary Moody is a 89 year old individual with history of CVA, coronary atherosclerosis, STEMI, comes in for right elbow redness and swelling.  Patient states that she started develop redness and swelling and pain in the right elbow area about 3 days ago.  Continues to go on so comes in for evaluation.  No fevers or chills.  Denies any loss of range of motion.  States that area is painful to movement.  No distal paresthesias or loss range of motion reported.  Denies any trauma that precipitated this.    Allergies:  Allergies   Allergen Reactions    Atorvastatin     Celebrex [Celecoxib] Nausea and Vomiting    Codeine     Codeine Phosphate     Ibuprofen     Ibuprofen Sodium     Influenza Virus Vaccine     Latex Hives    Lisinopril Cough    Morphine And Codeine     Pcn [Penicillins]     Welchol [Colesevelam]     Niaspan [Niacin] Rash     Upset Stomach       Problem List:    Patient Active Problem List    Diagnosis Date Noted    CVA (cerebral vascular accident) (H) 07/09/2023     Priority: Medium    Coronary atherosclerosis 07/09/2023     Priority: Medium    Unstable angina pectoris (H) 12/20/2020     Priority: Medium     Added automatically from request for surgery [id number]      Pneumonia 08/02/2018     Priority: Medium    ST elevation myocardial infarction involving right coronary artery (H) 08/02/2018     Priority: Medium    Chest pain 05/28/2016     Priority: Medium        Past Medical History:    No past medical history on file.    Past Surgical History:    Past Surgical History:   Procedure Laterality Date    CV CORONARY ANGIOGRAM N/A 12/21/2020    Procedure: Coronary Angiogram;  Surgeon: Benjie Betancourt MD;  [facility]    CV RIGHT HEART CATH MEASUREMENTS RECORDED N/A 12/21/2020    Procedure: Heart Catheterization with Possible Intervention;  Surgeon: Benjie Betancourt MD;  [facility]  CARD[facility]AC CATH LAB       Family History:    No family history on file.    Social History:  Marital Status:   [2]  Social History     Tobacco Use    Smoking status: Never    Smokeless tobacco: Never   Substance Use Topics    Alcohol use: No    Drug use: No        Medications:    clindamycin (CLEOCIN) 150 MG capsule  acetaminophen (TYLENOL) 325 MG tablet  amLODIPine (NORVASC) 5 MG tablet  aspirin 81 MG tablet  clopidogrel (PLAVIX) 75 MG tablet  Cyanocobalamin 500 MCG TBDP  folic acid (FOLVITE) 1 MG tablet  metoprolol tartrate (LOPRESSOR) 50 MG tablet  nitroGLYcerin (NITROSTAT) 0.4 MG sublingual tablet  polyethylene glycol (MIRALAX/GLYCOLAX) Packet  rosuvastatin (CRESTOR) 40 MG tablet  thyroid (ARMOUR) 60 MG tablet  vitamin D3 (CHOLECALCIFEROL) 10 MCG (400 UNIT) capsule          Review of Systems   Constitutional: Negative.    HENT: Negative.     Eyes: Negative.    Respiratory: Negative.     Cardiovascular: Negative.    Gastrointestinal: Negative.    Endocrine: Negative.    Genitourinary: Negative.    Musculoskeletal:  Positive for joint swelling (Right elbow).   Skin:  Positive for color change (Redness and swelling to right elbow).   Allergic/Immunologic: Negative.    Neurological: Negative.    Hematological: Negative.    Psychiatric/Behavioral: Negative.         Physical Exam   BP: 157/79  Pulse: 80  Temp: 97.1  F (36.2  C)  Resp: 18  Weight: 80.1 kg (176 lb 8 oz)  SpO2: 93 %      GENERAL APPEARANCE:  The patient is a 89 year old well-developed, well-nourished individual in no acute distress that appears as stated age.  EXTREMITIES: Swelling to the right elbow present.  Radial pulses are 2+ to the right upper extremity.  Capillary refill less than 2 seconds to all digits of right hand.  MUSCULOSKELETAL: Tenderness to palpation over the right elbow.  Pain with range of motion but no crepitus or deformities.  No weakness noted.  NEUROLOGIC:  No focal sensory or motor deficits are noted.    PSYCHIATRIC:  The  patient is awake, alert, and oriented x4.  Recent and remote memory is intact.  Appropriate mood and affect.  Calm and cooperative with history and physical exam.  SKIN:  Warm, dry, and well perfused.  Good turgor.  Redness and swelling noted over olecranon with callusing.    Comment: Discrepancies between my note and notes on behalf of the nursing team or other care providers are secondary to my findings reflecting my physical examination and questioning of the patient.  Any conflicting information provided is not in line with my examination of the patient.       ED Course     ED Course as of 07/26/24 1206   Fri Jul 26, 2024   1100 X-ray right elbow ordered while patient in lobby.   1145 In to see patient and history/physical completed.    1159 WBC(!): 13.1  WBC 13.1 but lactic acid normal at 1.1.  Does have a CRP of 56.31.  POCUS soft tissue ultrasound does show cobblestoning and fluid fluid collection at bursa.  Cannot rule out septic bursitis but will not aspirate due to surrounding cellulitis.  As patient is doing well we will discharge home on clindamycin 300 mg 3 times daily x 14 days.  Return precautions given.     POC US SOFT TISSUE    Date/Time: 7/26/2024 12:01 PM    Performed by: John Paul Edgar APRN CNP  Authorized by: John Paul Edgar APRN CNP    Procedure Details & Findings:      Limited Soft Tissue Ultrasound, performed and interpreted by me.    Indication:  Skin redness warmth pain. Evaluate for cellulitis vs abscess.     Body location: right upper extremity    Findings:  There is cobblestoning suggestive of cellulitis in the evaluated area. There is a fluid collection to suggest bursitis. No foreign body identified    IMPRESSION: Cellulitis with bursitis.              Results for orders placed or performed during the hospital encounter of 07/26/24 (from the past 24 hour(s))   CBC with Platelets & Differential    Narrative    The following orders were created for panel order CBC with Platelets &  Differential.  Procedure                               Abnormality         Status                     ---------                               -----------         ------                     CBC with platelets and d...[[id number]]  Abnormal            Final result               Manual Differential[[id number]]          Abnormal            Final result                 Please view results for these tests on the individual orders.   Lactic acid whole blood with 1x repeat in 2 hr when >2   Result Value Ref Range    Lactic Acid, Initial 1.1 0.7 - 2.0 mmol/L   [place] Draw    Narrative    The following orders were created for panel order [place] Draw.  Procedure                               Abnormality         Status                     ---------                               -----------         ------                     Extra Blood Culture Bottle[[id number]]                       In process                   Please view results for these tests on the individual orders.   CRP inflammation   Result Value Ref Range    CRP Inflammation 56.31 (H) <5.00 mg/L   Basic metabolic panel   Result Value Ref Range    Sodium 139 135 - 145 mmol/L    Potassium 3.9 3.4 - 5.3 mmol/L    Chloride 102 98 - 107 mmol/L    Carbon Dioxide (CO2) 26 22 - 29 mmol/L    Anion Gap 11 7 - 15 mmol/L    Urea Nitrogen 20.6 8.0 - 23.0 mg/dL    Creatinine 0.94 0.51 - 0.95 mg/dL    GFR Estimate 57 (L) >60 mL/min/1.73m2    Calcium 10.1 8.8 - 10.4 mg/dL    Glucose 137 (H) 70 - 99 mg/dL   CBC with platelets and differential   Result Value Ref Range    WBC Count 13.1 (H) 4.0 - 11.0 10e3/uL    RBC Count 4.43 3.80 - 5.20 10e6/uL    Hemoglobin 13.2 11.7 - 15.7 g/dL    Hematocrit 40.3 35.0 - 47.0 %    MCV 91 78 - 100 fL    MCH 29.8 26.5 - 33.0 pg    MCHC 32.8 31.5 - 36.5 g/dL    RDW 13.6 10.0 - 15.0 %    Platelet Count 189 150 - 450 10e3/uL    NRBCs per 100 WBC 0 <1 /100    Absolute NRBCs 0.0 10e3/uL   Extra Tube    Narrative    The following orders were created for  panel order Extra Tube.  Procedure                               Abnormality         Status                     ---------                               -----------         ------                     Extra Blue Top Tube[[id number]]                              In process                 Extra Red Top Tube[[id number]]                               In process                   Please view results for these tests on the individual orders.   Manual Differential   Result Value Ref Range    % Neutrophils 73 %    % Lymphocytes 10 %    % Monocytes 16 %    % Eosinophils 1 %    % Basophils 0 %    Absolute Neutrophils 9.6 (H) 1.6 - 8.3 10e3/uL    Absolute Lymphocytes 1.3 0.8 - 5.3 10e3/uL    Absolute Monocytes 2.1 (H) 0.0 - 1.3 10e3/uL    Absolute Eosinophils 0.1 0.0 - 0.7 10e3/uL    Absolute Basophils 0.0 0.0 - 0.2 10e3/uL    RBC Morphology Confirmed RBC Indices     Platelet Assessment  Automated Count Confirmed. Platelet morphology is normal.     Automated Count Confirmed. Platelet morphology is normal.   Elbow XR, 2 views, right    Narrative    XR ELBOW RIGHT 2 VIEWS    HISTORY: 89 years Female Pain and swelling    COMPARISON: None    TECHNIQUE: 3 views right elbow    FINDINGS: There is soft tissue edema. No evidence of significant joint  effusion. There is some degenerative calcification/chondrocalcinosis.  There is no evidence of fracture or dislocation.      Impression    IMPRESSION: Diffuse soft tissue edema is present. There is soft tissue  edema posteriorly, question bursal fluid collections/bursitis.    Chondrocalcinosis.    No evidence of fracture.    MARTI GEORGE MD         SYSTEM ID:  [id number]       Medications - No data to display    Assessments & Plan (with Medical Decision Making)     I have reviewed the nursing notes.    I have reviewed the findings, diagnosis, plan and need for follow up with the patient.    Summary:  Patient presents to the ER today right elbow redness, swelling, and pain.  Potential  diagnosis which have been considered and evaluated include septic joint, cellulitis, bursitis, septic bursitis, as well as others. Many of these have been excluded using the various modalities and assessment as noted on the chart. At the present time, the diagnosis given seems to be the most likely haptic bursitis with surrounding cellulitis.  Upon arrival, vitals signs are normal.  The patient is alert and oriented no distress.  Does have redness, swelling, and tenderness over the right elbow that does progress around into the antecubital area.  Slight pain with movement but range of motion otherwise intact.  Distal CMS intact.  No crepitus noted on examination.  X-ray personally reviewed showing no fracture or dislocation.  Lab work was initiated while patient in lobby showing WBC of 13.1 with hemoglobin 13.2.  Electrolytes and renal functions normal.  Lactic acid normal at 1.1.  Does have a CRP of 56.31.  Did do POCUS soft tissue ultrasound showing cobblestoning and fluid collection at the bursa.  No obvious foreign body.  Patient has full range of motion of the elbow without any difficulty making septic joint highly unlikely.  Patient likely has cellulitis and questionable septic bursitis.  For this reason we will discharge patient home on clindamycin 300 mg 3 times daily x 14 days for treatment.  Advised patient to follow-up with PCP for reevaluation and return to ER for any worsening or new symptoms or concerns.  Patient verbalized understanding agrees with plan of care.  Patient discharged home.        Critical Care Time: None    Impression and plan discussed with patient. Questions answered, concerns addressed, indications for urgent re-evaluation reviewed, and  given. Patient/Parent/Caregiver agree with treatment plan and have no further questions at this time.  AVS provided at discharge.    This note was created by the Dragon Voice Dictation System. Inadvertent typographical errors, due to software  recognition problems, may still exist.             New Prescriptions    CLINDAMYCIN (CLEOCIN) 150 MG CAPSULE    Take 2 capsules (300 mg) by mouth 3 times daily for 14 days       Final diagnoses:   Cellulitis of right upper extremity   Septic bursitis of elbow, right       7/26/2024   [facility]       John Paul Edgar, ASMITA CNP  07/26/24 120

## 2024-07-26 NOTE — DISCHARGE INSTRUCTIONS
Antibiotic use:   An antibiotic was ordered to help fight the bacterial infection that was acquired.  You need to take this medication exactly as prescribed.  DO NOT stop taking this medication until the prescribed end date, even if you are feeling better.  This way the affecting bacteria in your body are killed off.   You should eat probiotic yogurt (with live cultures) or Kefir (similar to yogurt) while taking antibiotic to promote regrowth of good bacteria in your digestive tract, which can be depleted by antibiotics.  Birth control and antibiotics (if applicable):   Birth control pills contain estrogens. Some antibiotics cause the enzymes in the liver to increase the break-down of estrogens and thereby can decrease the levels of estrogens in the body and the effectiveness of the birth control medications.  This can result in unwanted pregnancy.  To be safe it is wise to use a back-up-method of birth control while you take, and for 7 days after finishing the antibiotic.  Coumadin and antibiotics (if applicable):   Finally, if on coumadin, let your coumadin prescriber know. You likely need to have your INR checked by your Primary Care Provider in 2-3 days. Contact your clinic for an appointment.         Follow-up with your primary care provider for reevaluation.  Contact your primary care provider if you have any questions or concerns.  Do not hesitate to return to the ER if any new or worsening symptoms.     Please read the attached instructions (if any).  They highlight more specific treatments and interventions for you at home.              Thank you for letting me participate in your care and wish you a fast and uneventful recovery,    John Paul TIAN CNP    Do not hesitate to contact me with questions or concerns.  [email or web address]

## 2024-09-16 NOTE — DISCHARGE SUMMARY
"[facility]  Hospitalist Discharge Summary       Date of Admission:  12/20/2020  Date of Discharge:  12/22/2020 11:39 AM  Discharging Provider: JoAnna K. Barthell, PA-C      Discharge Diagnoses   Atypical chest pain.  CAD with recent STEMI 3/2020 with arthrectomy and BO to RCA.  HTN / HLD.  Acute hypoxic respiratory failure.  Hypothyroidism.  Constipation.   Vit D deficiency  Folate deficiency  B12 deficiency  Hx CVA/TIA.  Follow-ups Needed After Discharge   Follow-up Appointments     Follow-up and recommended labs and tests       Please call to schedule follow up with Khadra Prather to occur   within 1-2 weeks. Inform this is a \"hospital follow up visit\" to help get   in during recommended time. OK to see a colleague of primary if needed.           Discharge Disposition   Discharged to home  Condition at discharge: Stable    Hospital Course   Kary Moody is a 87 year old female admitted on 12/20/2020. Significant PMHx CAD with STEMI 3/2020 and HTN who was directly admitted under observation status from [facility] ED where she presented with atypical chest pain.       Atypical chest pain.  CAD with recent STEMI 3/2020 with arthrectomy and BO to RCA.  HTN / HLD.  Presented to [facility] ED with left shoulder pain and upper chest pain.  She indicated this was similar to previous STEMI from earlier this year.  EKG sinus without dynamic changes. Serial troponin detectable but within reference range.  BNP negative (459).  Treated with nitroglycerin drip at ED.  - Telemetry without event.  - TTE 12/2020 showing no WMA (though inferior wall not imaged in standard views, normal appearing on alternative view) asymmetric LV hypertrophy without evidence LVOT, moderate LVH, evidence HCM without obstructive pattern.  - Cardiology consulted. Underwent cor angiogram which showed moderate disease mid-LAD and 2nd diagonal as well as patent p-RCA stent with focal area of stent " underexpansion due to an eccentric calcified lesion resulting in <25% narrowing, however this area is non-flow limiting and is large in caliber and lumen area.  - Cont PTA regimen.  - No recurrence of symptoms since adm to hospital.    Acute hypoxic respiratory failure. - Resolved  O2 saturation on RA at 88-90% in ED requirement supplemental O2. No infectious signs/symptoms, SOB/MIDDLETON. COVID-19 / influenza PCR negative.  CXR negative.  D-dimer elevated at 1.1, CTA chest showed bilateral ground glass opacities -- thought possible sequela geographic air  Trapping or atypical infection -- favor former as no other evidence to suggest infection. Weaned off O2 by time of arrival to [facility].     Asymptomatic COVID19 PCR negative 12/20/2020. Low suspicion.    Hypothyroidism. [place] thyroid restarted at patient request under observation status.     Constipation. PRN bowel regiment available.       Vit D deficiency  Folate deficiency  B12 deficiency  - Resume PTA replacement at observation discharge.       Hx CVA/TIA.  - Resume PTA ASA, Plavix and antihypertensive medications. Statin at obs discharge.     Consultations This Hospital Stay   CARDIOLOGY IP CONSULT  CARE MANAGEMENT / SOCIAL WORK IP CONSULT  PHYSICAL THERAPY ADULT IP CONSULT  PHARMACY IP CONSULT  PHARMACY IP CONSULT  SMOKING CESSATION PROGRAM IP CONSULT    Code Status   Full Code    Time Spent on this Encounter   I, JoAnna K. Barthell, PA-C, personally saw the patient today and spent less than or equal to 30 minutes discharging this patient.     This patient was discussed with Dr. Baez of the Hospitalist Service who agrees with current plans as outlined above.    JoAnna K. Barthell, PA-C  [facility]  ______________________________________________________________________  Physical Exam   Temp: 97.5  F (36.4  C) Temp src: Oral BP: (!) 144/71 Pulse: 64   Resp: 18 SpO2: 95 % O2 Device: None (Room air)    Constitutional: Appears stated  "age, no acute distress.  Respiratory: No increased work of breathing.  Cardiovascular: Radial access site c/d/i with mild underlying ecchymosis. Neurovasc intact.       Primary Care Physician   Khadra Tariq    Discharge Orders      Reason for your hospital stay    Further evaluation chest pain.     Follow-up and recommended labs and tests     Please call to schedule follow up with Khadra Prather to occur within 1-2 weeks. Inform this is a \"hospital follow up visit\" to help get in during recommended time. OK to see a colleague of primary if needed.     Activity    Your activity upon discharge: activity as tolerated     Full Code     Diet    Follow this diet upon discharge: Regular Diet Adult       Significant Results and Procedures   Most Recent 3 CBC's:  Recent Labs   Lab Test 12/20/20  0155 03/15/20  0454 08/02/18  1251   WBC 6.8 5.9 7.4   HGB 12.0 11.3* 9.2*   MCV 86 87 89    129* 173     Most Recent 3 BMP's:  Recent Labs   Lab Test 12/22/20  0848 12/20/20 2001 12/20/20  0155 03/15/20  0454     --  139 137   POTASSIUM 4.3 4.0 3.8 3.9   CHLORIDE 105  --  107 105   CO2 30  --  26 25   BUN 15  --  21 16   CR 0.68  --  0.79 0.68   ANIONGAP 3  --  6 7   CANDACE 9.9  --  10.1 9.3   GLC 96  --  112* 100*     Most Recent 2 LFT's:  Recent Labs   Lab Test 12/20/20  0155 03/15/20  0454   AST 26 75*   ALT 29 62*   ALKPHOS 119 155*   BILITOTAL 0.5 0.5     Most Recent 3 Troponin's:  Recent Labs   Lab Test 12/21/20  1000 12/21/20  0112 12/20/20 2001   TROPI 0.024 0.037 0.025     Most Recent 3 BNP's:  Recent Labs   Lab Test 12/20/20  0508 08/02/18  1251 05/28/16  1158   NTBNPI 459 270 186     Most Recent D-dimer:  Recent Labs   Lab Test 12/20/20  0155 05/28/16  0306   DD 1.1*  --    DIMER  --  285     Most Recent TSH and T4:  Recent Labs   Lab Test 12/20/20  2001   TSH 1.60   ,   Results for orders placed or performed during the hospital encounter of 12/20/20   Echocardiogram Complete    Narrative    " [id number]  [facility]  [id number]  [id number]^KASSIE^MARIAH^JOSI           [facility]  Echocardiography Laboratory  [address] [place] [address]        Name: LUISITO GARCIA  MRN: [id number]  : 1933  Study Date: 2020 09:05 AM  Age: 87 yrs  Gender: Female  Patient Location: [facility]  Reason For Study: Chest Pain  Ordering Physician: MARIAH FERNANDO  Referring Physician: CHELI BERMEO  Performed By: Martínez Ramos RDCS     BSA: 1.8 m2  Height: 62 in  Weight: 165 lb  HR: 74  BP: 140/62 mmHg  _____________________________________________________________________________  __        Procedure  Complete Portable Echo Adult. Optison (NDC #[id number]) given intravenously.  _____________________________________________________________________________  __        Interpretation Summary     Left ventricular hypertrophy: asymmetric with no LVOT obstruction  There is moderate concentric left ventricular hypertrophy.  Left ventricular systolic function is normal.  The ascending aorta is Mildly dilated.  Inferior wall was not imaged in all standard views --no apical 2C views--but  in the basal SAX view the inferior wall is normal (recent stent to RCA by  history). The distal septum/apex is thinner than the prox septal wall but the  apex is moving and this appears to be HCM with out obstruction pattern  _____________________________________________________________________________  __        Left Ventricle  The left ventricle is normal in size. Left ventricular hypertrophy: asymmetric  with no LVOT obstruction. There is moderate concentric left ventricular  hypertrophy. Proximal septal thickening is noted. Left ventricular systolic  function is normal. The visual ejection fraction is estimated at 60%. Grade I  or early diastolic dysfunction. Regional wall motion abnormalities cannot be  excluded due to limited visualization. Inferior wall was not imaged in all  standard views --no apical 2C  views--but in the basal SAX view the inferior  wall is normal (recent stent to RCA by history). The distal septum/apex is  thinner than the prox septal wall but the apex is moving and this appears to  be HCM with out obstruction pattern. There is no thrombus seen in the left  ventricle.     Right Ventricle  The right ventricle is normal in structure, function and size.     Atria  There is mild biatrial enlargement. Lipomatous hypertrophy of the interatrial  septum is noted.     Mitral Valve  There is mild mitral annular calcification.        Tricuspid Valve  Normal tricuspid valve. Right ventricular systolic pressure could not be  approximated due to inadequate tricuspid regurgitation.     Aortic Valve  There is moderate aortic sclerosis of the non-coronary cusp. No aortic  regurgitation is present. No hemodynamically significant valvular aortic  stenosis.     Pulmonic Valve  The pulmonic valve is not well seen, but is grossly normal.     Vessels  The aortic root is normal size. The ascending aorta is Mildly dilated.     Pericardium  The pericardium appears normal.        Rhythm  Sinus rhythm was noted.  _____________________________________________________________________________  __  MMode/2D Measurements & Calculations  IVSd: 2.3 cm     LVIDd: 3.4 cm  LVIDs: 2.3 cm  LVPWd: 1.6 cm  FS: 32.8 %  LV mass(C)d: 297.8 grams  LV mass(C)dI: 169.0 grams/m2  Ao root diam: 3.5 cm  LA dimension: 3.9 cm  asc Aorta Diam: 4.0 cm  LA/Ao: 1.1  LA Volume (BP): 57.0 ml  LA Volume Index (BP): 32.4 ml/m2  RWT: 0.96           Doppler Measurements & Calculations  MV E max jaye: 77.0 cm/sec  MV A max jaye: 100.7 cm/sec  MV E/A: 0.76  MV dec time: 0.25 sec  PA acc time: 0.11 sec  E/E' av.5  Lateral E/e': 13.8  Medial E/e': 13.2           _____________________________________________________________________________  __           Report approved by: Darinel Morgan 2020 12:36 PM      Cardiac Catheterization    Narrative    1.  No severe obstructive coronary artery disease  2. Moderate disease of the mid LAD and second diagonal branch  3. Patent proximal RCA stent with focal area of stent underexpansion due   to an eccentric calcified lesion resulting in <25% narrowing, however this   area is non-flow limiting and is large in caliber and lumen area.       Discharge Medications   Discharge Medication List as of 12/22/2020 10:57 AM      CONTINUE these medications which have NOT CHANGED    Details   amLODIPine (NORVASC) 5 MG tablet Take 5 mg by mouth daily, Historical      aspirin 81 MG tablet Take 81 mg by mouth daily, Historical      clopidogrel (PLAVIX) 75 MG tablet Take 75 mg by mouth daily , Historical      Cyanocobalamin 500 MCG TBDP Take 500 mcg by mouth daily , Historical      folic acid (FOLVITE) 1 MG tablet Take 1 mg by mouth daily , Historical      metoprolol tartrate (LOPRESSOR) 50 MG tablet Take 100 mg in the morning and 50 mg in the evening, Historical      nitroGLYcerin (NITROSTAT) 0.4 MG sublingual tablet Place 1 tablet under the tongue every 5 minutes as needed for chest pain, Historical      polyethylene glycol (MIRALAX/GLYCOLAX) Packet Take 1 packet by mouth daily, Historical      rosuvastatin (CRESTOR) 40 MG tablet Take 40 mg by mouth daily, Historical      thyroid (ARMOUR) 60 MG tablet Take 60 mg by mouth daily , Historical      vitamin D3 (CHOLECALCIFEROL) 10 MCG (400 UNIT) capsule Take 1 capsule by mouth daily, Historical           Allergies   Allergies   Allergen Reactions     Atorvastatin      Celebrex [Celecoxib] Nausea and Vomiting     Codeine Phosphate      Flu Virus Vaccine      Ibuprofen Sodium      Latex Hives     Lisinopril Cough     Penicillins      Welchol [Colesevelam]      Niaspan [Niacin] Rash     Upset Stomach        Yes

## 2024-12-18 ENCOUNTER — TELEPHONE (OUTPATIENT)
Dept: INTERVENTIONAL RADIOLOGY/VASCULAR | Facility: HOSPITAL | Age: 89
End: 2024-12-18

## 2024-12-18 ENCOUNTER — MEDICAL CORRESPONDENCE (OUTPATIENT)
Dept: INTERVENTIONAL RADIOLOGY/VASCULAR | Facility: HOSPITAL | Age: 89
End: 2024-12-18

## 2024-12-18 NOTE — TELEPHONE ENCOUNTER
Received callback from PCP clinic. Per PCP, patient is okay holding her Plavix and Aspirin for 5 days prior to her injection. Patient called and instructed to hold her Plavix and Aspirin starting on 12/26/24 until the day after her injection. Patient instructed it's okay to continue taking all her other regular home medications. Patient verbalizes understanding.

## 2024-12-18 NOTE — TELEPHONE ENCOUNTER
PCP clinic called as patient is scheduled for a high risk lumbar WADE injection on 12/31/24. [place]'s management prior to high risk procedures is for Aspirin to be held 3-5 days prior to procedure and for Plavix to be held for 5 days prior to procedure. Awaiting callback from PCP clinic regarding medication holds.

## 2024-12-31 ENCOUNTER — HOSPITAL ENCOUNTER (OUTPATIENT)
Dept: INTERVENTIONAL RADIOLOGY/VASCULAR | Facility: HOSPITAL | Age: 89
Discharge: HOME | End: 2024-12-31
Attending: FAMILY MEDICINE
Payer: MEDICARE

## 2024-12-31 ENCOUNTER — HOSPITAL ENCOUNTER (OUTPATIENT)
Facility: HOSPITAL | Age: 89
Discharge: HOME | End: 2024-12-31
Attending: RADIOLOGY | Admitting: RADIOLOGY
Payer: MEDICARE

## 2024-12-31 DIAGNOSIS — M43.06 LUMBAR SPONDYLOLYSIS: ICD-10-CM

## 2024-12-31 DIAGNOSIS — M47.26 OTHER SPONDYLOSIS WITH RADICULOPATHY, LUMBAR REGION: ICD-10-CM

## 2024-12-31 PROCEDURE — 272N000472 XR LUMBAR TRANSLAMINAR INJ INCL IMAGING

## 2024-12-31 PROCEDURE — 250N000011 HC RX IP 250 OP 636: Performed by: RADIOLOGY

## 2024-12-31 RX ORDER — IOPAMIDOL 612 MG/ML
15 INJECTION, SOLUTION INTRATHECAL ONCE
Status: COMPLETED | OUTPATIENT
Start: 2024-12-31 | End: 2024-12-31

## 2024-12-31 RX ORDER — DEXAMETHASONE SODIUM PHOSPHATE 10 MG/ML
10 INJECTION, SOLUTION INTRAMUSCULAR; INTRAVENOUS ONCE
Status: COMPLETED | OUTPATIENT
Start: 2024-12-31 | End: 2024-12-31

## 2024-12-31 RX ADMIN — DEXAMETHASONE SODIUM PHOSPHATE 10 MG: 10 INJECTION, SOLUTION INTRAMUSCULAR; INTRAVENOUS at 09:14

## 2024-12-31 RX ADMIN — IOPAMIDOL 4 ML: 612 INJECTION, SOLUTION INTRATHECAL at 09:14

## 2024-12-31 ASSESSMENT — ACTIVITIES OF DAILY LIVING (ADL): ADLS_ACUITY_SCORE: 47

## 2024-12-31 NOTE — DISCHARGE INSTRUCTIONS
Cell number on file:    Telephone Information:   Mobile none     Is it ok to leave a message at this number(s)? Yes    Dr. Partida completed your procedure on 12/31/2024.    Current Pain Level (0-10 Scale): 7/10  Post Pain Level (0-10):  1/10    Radiology Discharge instructions for Steroid Injection    Activity Level:     Do not do any heavy activity or exercise for 24 hours.   Do not drive for 4 hours after your injection.  Diet:   Return to your normal diet.  Medications:   If you have stopped taking your Aspirin, Coumadin/Warfarin, Ibuprofen, or any   other blood thinner for this procedure you may resume in the morning unless   your primary care provider has given you other instructions.    Diabetics may see an increase in blood sugar after steroid injections. If you are concerned about your blood sugar, please contact your family doctor.    Site Care:  Remove the bandage and bathe or shower the morning after the procedure.      Please allow two weeks to experience improvement in your pain.  If you have any further issues, please contact your provider.    Call your Primary Care Provider if you have the following (if your primary care provider is not available please seek emergency care):   Nausea with vomiting   Severe headache   Drowsiness or confusion   Redness or drainage at the injection or puncture site   Temperature over 101 degrees F   Other concerns   Worsening back pain   Stiff neck      
None

## 2024-12-31 NOTE — PROGRESS NOTES
Medication Held for Diagnostic Imaging Procedures:    The following Blood Thinners - ASA  Plavix, were held per protocol beginning on 12-26-24.    Medications can be resumed when provider has instructed or the next morning as instructed on the Discharge Summary.      Technologist: Shana Bernal

## 2025-01-14 ENCOUNTER — TELEPHONE (OUTPATIENT)
Dept: INTERVENTIONAL RADIOLOGY/VASCULAR | Facility: HOSPITAL | Age: OVER 89
End: 2025-01-14

## 2025-01-14 NOTE — TELEPHONE ENCOUNTER
INJECTION POST CALL    Procedure: Epidural TL L4-5  Radiologist(s): Dr. Hu Partida  Date of Procedure:  12/31/24    Left a message for patient to call IR department back  Ashley Reynolds

## 2025-04-28 ENCOUNTER — HOSPITAL ENCOUNTER (EMERGENCY)
Facility: HOSPITAL | Age: OVER 89
Discharge: HOME | End: 2025-04-28
Attending: STUDENT IN AN ORGANIZED HEALTH CARE EDUCATION/TRAINING PROGRAM
Payer: MEDICARE

## 2025-04-28 VITALS
OXYGEN SATURATION: 95 % | RESPIRATION RATE: 16 BRPM | SYSTOLIC BLOOD PRESSURE: 177 MMHG | DIASTOLIC BLOOD PRESSURE: 89 MMHG | HEART RATE: 72 BPM | TEMPERATURE: 98.3 F

## 2025-04-28 DIAGNOSIS — L03.90 CELLULITIS, UNSPECIFIED CELLULITIS SITE: ICD-10-CM

## 2025-04-28 PROCEDURE — 99283 EMERGENCY DEPT VISIT LOW MDM: CPT

## 2025-04-28 PROCEDURE — 99283 EMERGENCY DEPT VISIT LOW MDM: CPT | Performed by: STUDENT IN AN ORGANIZED HEALTH CARE EDUCATION/TRAINING PROGRAM

## 2025-04-28 RX ORDER — CEPHALEXIN 500 MG/1
500 CAPSULE ORAL 4 TIMES DAILY
Qty: 28 CAPSULE | Refills: 0 | Status: SHIPPED | OUTPATIENT
Start: 2025-04-28 | End: 2025-05-05

## 2025-04-28 ASSESSMENT — COLUMBIA-SUICIDE SEVERITY RATING SCALE - C-SSRS
2. HAVE YOU ACTUALLY HAD ANY THOUGHTS OF KILLING YOURSELF IN THE PAST MONTH?: NO
1. IN THE PAST MONTH, HAVE YOU WISHED YOU WERE DEAD OR WISHED YOU COULD GO TO SLEEP AND NOT WAKE UP?: NO
6. HAVE YOU EVER DONE ANYTHING, STARTED TO DO ANYTHING, OR PREPARED TO DO ANYTHING TO END YOUR LIFE?: NO

## 2025-04-28 ASSESSMENT — ACTIVITIES OF DAILY LIVING (ADL): ADLS_ACUITY_SCORE: 47

## 2025-04-28 NOTE — DISCHARGE INSTRUCTIONS
Take the antibiotics as directed.  Return to the emergency department for worsening symptoms or new concerning symptoms.  Follow-up with primary care provider in the next week.  Call schedule appointment

## 2025-04-28 NOTE — ED PROVIDER NOTES
[facility]  ED Provider Note    Chief Complaint   Patient presents with    Leg Swelling     History:  Kary Moody is a 89 year old female with previous history of cellulitis presents to the emergency department today complaining that she thinks she is just starting to develop a cellulitis.  Symptoms started either today or yesterday.  She complains of a small red area that is started growing that is a little bit tender.  No fevers.  She does note a small blister has formed in the area.  No other complaint    Review of Systems   Performed; see HPI for pertinent positives and negatives.     Medical history, surgical history, and social history was reviewed.  Nursing documentation, triage note, and vitals were reviewed.    Vitals:  BP: (!) 177/89  Pulse: 72  Temp: 98.3  F (36.8  C)  Resp: 16  SpO2: 95 %    Physical Exam:  Constitutional: Alert and conversant. NAD   HENT: NCAT   Eyes: Normal pupils   Neck: supple   CV: No pallor  Pulmonary/Chest: Non-labored respirations  Abdominal: non-distended   MSK: FLEMING.   Neuro: Alert and appropriate   Skin: Warm and dry. No diaphoresis.3cm radius circular red tender area on the front of the right shin with a single small blister     Psych: Appropriate mood and affect       MDM:      ED Course as of 04/28/25 1532   Mon Apr 28, 2025   1532 Differential diagnosis includes cellulitis, erysipelas, lymphangitis, folliculitis, abscess, necrotizing soft tissue infection, fungal infections, DVT, osteomyelitis, bug bite scabies, gangrene  On exam the patient is well-appearing and nontoxic  Vitals acceptable  Based on the patient's presenting symptoms, clinical history and exam, laboratory evaluation not indicated.  Based on presenting symptoms clinical history and exam, imaging evaluation not indicated.  Based on the above, low suspicion for abscess, no indication for I&D.  Most likely etiology here is cellulitis.  No red flag symptoms or signs to suggest  necrotizing soft tissue infection, osteomyelitis, abscess or other high morbidity/mortality condition.  Patient is well enough and symptoms minor enough to make outpatient management appropriate.  We will proceed with oral antibiotics and primary care follow-up.  Patient discharged in stable condition with all questions answered and return precautions given.  Primary care follow-up recommended for 1 week         Procedures:  Procedures        Impression:  Final diagnoses:   Cellulitis, unspecified cellulitis site            Telly Mcallister MD  04/28/25 [id number]

## 2025-04-28 NOTE — ED TRIAGE NOTES
Patient's right lower leg is swollen. Cellulitis. Denies pain in leg. Had this about a month and a half ago

## 2025-05-06 ENCOUNTER — HOSPITAL ENCOUNTER (EMERGENCY)
Facility: HOSPITAL | Age: OVER 89
Discharge: HOME | End: 2025-05-06
Attending: NURSE PRACTITIONER
Payer: MEDICARE

## 2025-05-06 VITALS
WEIGHT: 177.8 LBS | HEART RATE: 69 BPM | OXYGEN SATURATION: 94 % | RESPIRATION RATE: 18 BRPM | TEMPERATURE: 98.4 F | SYSTOLIC BLOOD PRESSURE: 169 MMHG | BODY MASS INDEX: 32.52 KG/M2 | DIASTOLIC BLOOD PRESSURE: 90 MMHG

## 2025-05-06 DIAGNOSIS — L03.115 CELLULITIS OF RIGHT LOWER EXTREMITY: Primary | ICD-10-CM

## 2025-05-06 PROCEDURE — 99213 OFFICE O/P EST LOW 20 MIN: CPT | Performed by: NURSE PRACTITIONER

## 2025-05-06 PROCEDURE — G0463 HOSPITAL OUTPT CLINIC VISIT: HCPCS

## 2025-05-06 RX ORDER — SULFAMETHOXAZOLE AND TRIMETHOPRIM 800; 160 MG/1; MG/1
1 TABLET ORAL 2 TIMES DAILY
Qty: 14 TABLET | Refills: 0 | Status: SHIPPED | OUTPATIENT
Start: 2025-05-06 | End: 2025-05-06

## 2025-05-06 RX ORDER — DOXYCYCLINE 100 MG/1
100 CAPSULE ORAL 2 TIMES DAILY
Qty: 14 CAPSULE | Refills: 0 | Status: SHIPPED | OUTPATIENT
Start: 2025-05-06 | End: 2025-05-13

## 2025-05-06 RX ORDER — CARVEDILOL 12.5 MG/1
1 TABLET ORAL 2 TIMES DAILY WITH MEALS
COMMUNITY
Start: 2023-07-11

## 2025-05-06 RX ORDER — FUROSEMIDE 20 MG/1
20 TABLET ORAL
COMMUNITY
Start: 2023-07-11

## 2025-05-06 ASSESSMENT — ENCOUNTER SYMPTOMS
PSYCHIATRIC NEGATIVE: 1
VOMITING: 0
DIARRHEA: 0
NAUSEA: 0
SHORTNESS OF BREATH: 0
CHILLS: 0
FEVER: 0
COLOR CHANGE: 1

## 2025-05-06 ASSESSMENT — COLUMBIA-SUICIDE SEVERITY RATING SCALE - C-SSRS
2. HAVE YOU ACTUALLY HAD ANY THOUGHTS OF KILLING YOURSELF IN THE PAST MONTH?: NO
6. HAVE YOU EVER DONE ANYTHING, STARTED TO DO ANYTHING, OR PREPARED TO DO ANYTHING TO END YOUR LIFE?: NO
1. IN THE PAST MONTH, HAVE YOU WISHED YOU WERE DEAD OR WISHED YOU COULD GO TO SLEEP AND NOT WAKE UP?: NO

## 2025-05-06 ASSESSMENT — ACTIVITIES OF DAILY LIVING (ADL): ADLS_ACUITY_SCORE: 47

## 2025-05-06 NOTE — DISCHARGE INSTRUCTIONS
Doxycycline as ordered (Separate from any vitamins by 4 hours)  - Take entire course of antibiotic even if you start to feel better.  - Antibiotics can cause stomach upset including nausea and diarrhea. Read your bottle or ask the pharmacist if antibiotic can be taken with food to help prevent nausea. If you have symptoms of diarrhea you can take an over-the-counter probiotic and/or increase foods with probiotics such as yogurt, [place], sauerkraut.    Monitor for worsening of infection    Elevate legs to help with swelling    Follow-up with primary care provider or return to urgent care/ED with any worsening in condition or additional concerns.

## 2025-05-06 NOTE — ED TRIAGE NOTES
Pt reports to having cellulitis in lower legs, she was seen for it recently and was given an abx for it which she finished on Monday and now the blister is getting worse, she had it at the time when she originally came it. She reported that it was getting better while on the abx but now getting worse again.  Some pain in the leg like a burning sensations. No fevers.

## 2025-05-06 NOTE — ED PROVIDER NOTES
History     Chief Complaint   Patient presents with    Wound Check     HPI  Kary Moody is a 89 year old female who presents to urgent care today ambulatory with complaints of redness and blister to right lower extremity.  Patient was seen on 4/28/2025 and started on cephalexin and symptoms started to improve and then once off medication symptoms started to worsen again.  Denies any increase in swelling.  Denies any pain, states once in a while has a burning sensation.  Denies any fever, chills, nausea, vomiting, diarrhea, shortness of breath or chest pain.  Patient states she did not take her furosemide this morning due to coming into urgent care, will take it when she gets home.  Denies any known tick exposure, states spouse had wood ticks on him two days ago, no other tick exposure.  No other concerns.    Allergies:  Allergies   Allergen Reactions    Atorvastatin     Celebrex [Celecoxib] Nausea and Vomiting    Codeine     Codeine Phosphate     Ibuprofen     Ibuprofen Sodium     Influenza Virus Vaccine     Latex Hives    Lisinopril Cough    Morphine And Codeine     Pcn [Penicillins]     Welchol [Colesevelam]     Niaspan [Niacin] Rash     Upset Stomach       Problem List:    Patient Active Problem List    Diagnosis Date Noted    CVA (cerebral vascular accident) (H) 07/09/2023     Priority: Medium    Coronary atherosclerosis 07/09/2023     Priority: Medium    Unstable angina pectoris (H) 12/20/2020     Priority: Medium     Added automatically from request for surgery [id number]      Pneumonia 08/02/2018     Priority: Medium    ST elevation myocardial infarction involving right coronary artery (H) 08/02/2018     Priority: Medium    Chest pain 05/28/2016     Priority: Medium        Past Medical History:    No past medical history on file.    Past Surgical History:    Past Surgical History:   Procedure Laterality Date    CV CORONARY ANGIOGRAM N/A 12/21/2020    Procedure: Coronary Angiogram;  Surgeon: Asia  Benjie Suresh MD;  Location:  HEART CARDIAC CATH LAB    CV RIGHT HEART CATH MEASUREMENTS RECORDED N/A 12/21/2020    Procedure: Heart Catheterization with Possible Intervention;  Surgeon: Benjie Betancourt MD;  Location:  HEART CARDIAC CATH LAB    IR ERCP  5/18/2021    IR ERCP  8/14/2020       Family History:    No family history on file.    Social History:  Marital Status:   [2]  Social History     Tobacco Use    Smoking status: Never    Smokeless tobacco: Never   Substance Use Topics    Alcohol use: No    Drug use: No        Medications:    aspirin 81 MG tablet  carvedilol (COREG) 12.5 MG tablet  clopidogrel (PLAVIX) 75 MG tablet  doxycycline hyclate (VIBRAMYCIN) 100 MG capsule  folic acid (FOLVITE) 1 MG tablet  furosemide (LASIX) 20 MG tablet  nitroGLYcerin (NITROSTAT) 0.4 MG sublingual tablet  rosuvastatin (CRESTOR) 40 MG tablet  thyroid (ARMOUR) 60 MG tablet  acetaminophen (TYLENOL) 325 MG tablet  amLODIPine (NORVASC) 5 MG tablet  Cyanocobalamin 500 MCG TBDP  metoprolol tartrate (LOPRESSOR) 50 MG tablet  polyethylene glycol (MIRALAX/GLYCOLAX) Packet  vitamin D3 (CHOLECALCIFEROL) 10 MCG (400 UNIT) capsule      Review of Systems   Constitutional:  Negative for chills and fever.   Respiratory:  Negative for shortness of breath.    Cardiovascular:  Negative for chest pain.   Gastrointestinal:  Negative for diarrhea, nausea and vomiting.   Skin:  Positive for color change (right lower extremity).   Psychiatric/Behavioral: Negative.       Physical Exam   BP: (!) 169/90  Pulse: 69  Temp: 98.4  F (36.9  C)  Resp: 18  Weight: 80.7 kg (177 lb 12.8 oz)  SpO2: 94 %    Physical Exam  Vitals and nursing note reviewed.   Constitutional:       General: She is not in acute distress.     Appearance: Normal appearance. She is not ill-appearing or toxic-appearing.   Cardiovascular:      Rate and Rhythm: Normal rate and regular rhythm.      Pulses: Normal pulses.      Heart sounds: Normal heart sounds.   Pulmonary:       Effort: Pulmonary effort is normal.      Breath sounds: Normal breath sounds.   Musculoskeletal:      Right lower le+ Pitting Edema present.      Left lower le+ Pitting Edema present.        Legs:    Skin:     General: Skin is warm and dry.      Capillary Refill: Capillary refill takes less than 2 seconds.   Neurological:      Mental Status: She is alert.   Psychiatric:         Mood and Affect: Mood normal.       ED Course     Procedures    No results found for this or any previous visit (from the past 24 hours).    Medications - No data to display    Assessments & Plan (with Medical Decision Making)     I have reviewed the nursing notes.    I have reviewed the findings, diagnosis, plan and need for follow up with the patient.  (L03.115) Cellulitis of right lower extremity  (primary encounter diagnosis)  Plan:   Patient ambulatory with a nontoxic appearance.  Patient arrived with cellulitis to right lower extremity with an intact blister, states that she was on cephalexin which she started on 2025, completed medication and cellulitis largely improved and blister shrunk and after completion of antibiotics started to worsen again.  No known tick exposure, states spouse had would ticks on him 2 days ago, no tick exposure for patient.  Denies any right lower extremity pain.  Denies any fever, chills, nausea, vomiting, diarrhea, shortness of breath or chest pain.  Given that cephalexin was helping cellulitis to resolve and then slightly worsened again, will start patient on doxycycline.  Monitor cellulitis.  Take furosemide when you get home as previously ordered.  Elevate bilateral lower extremities due to 2+ pitting edema, patient states no changes to swelling, no current concerns.  Follow-up with primary care provider or return to urgent care/ED with any worsening in condition or additional concerns.  Patient in agreement treatment plan.    Discharge Medication List as of 2025  9:20 AM         START taking these medications    Details   doxycycline hyclate (VIBRAMYCIN) 100 MG capsule Take 1 capsule (100 mg) by mouth 2 times daily for 7 days., Disp-14 capsule, R-0, E-Prescribe           Final diagnoses:   Cellulitis of right lower extremity     5/6/2025   [facility]       Ines Zavaleta NP  05/06/25 0964

## 2025-05-06 NOTE — ED TRIAGE NOTES
ANA Zavaleta CNP assessed patient in triage and determined patient Urgent Care appropriate. Will be seen in Urgent Care.

## 2025-05-13 ENCOUNTER — HOSPITAL ENCOUNTER (EMERGENCY)
Facility: HOSPITAL | Age: OVER 89
Discharge: HOME | End: 2025-05-13
Attending: EMERGENCY MEDICINE
Payer: MEDICARE

## 2025-05-13 VITALS
OXYGEN SATURATION: 92 % | DIASTOLIC BLOOD PRESSURE: 77 MMHG | RESPIRATION RATE: 20 BRPM | SYSTOLIC BLOOD PRESSURE: 171 MMHG | HEART RATE: 71 BPM | TEMPERATURE: 98.6 F

## 2025-05-13 DIAGNOSIS — L03.115 CELLULITIS OF RIGHT LEG: ICD-10-CM

## 2025-05-13 PROCEDURE — 99283 EMERGENCY DEPT VISIT LOW MDM: CPT

## 2025-05-13 PROCEDURE — 99283 EMERGENCY DEPT VISIT LOW MDM: CPT | Performed by: EMERGENCY MEDICINE

## 2025-05-13 RX ORDER — DOXYCYCLINE 100 MG/1
100 CAPSULE ORAL 2 TIMES DAILY
Qty: 14 CAPSULE | Refills: 0 | Status: SHIPPED | OUTPATIENT
Start: 2025-05-13 | End: 2025-05-20

## 2025-05-13 ASSESSMENT — COLUMBIA-SUICIDE SEVERITY RATING SCALE - C-SSRS
1. IN THE PAST MONTH, HAVE YOU WISHED YOU WERE DEAD OR WISHED YOU COULD GO TO SLEEP AND NOT WAKE UP?: NO
2. HAVE YOU ACTUALLY HAD ANY THOUGHTS OF KILLING YOURSELF IN THE PAST MONTH?: NO
6. HAVE YOU EVER DONE ANYTHING, STARTED TO DO ANYTHING, OR PREPARED TO DO ANYTHING TO END YOUR LIFE?: NO

## 2025-05-13 NOTE — ED PROVIDER NOTES
Patient: Juan Baldwin    Procedure: Procedure(s):  Left hallux MTP joint cheilectomy       Diagnosis: Hallux rigidus, left foot [M20.22]  Diagnosis Additional Information: No value filed.    Anesthesia Type:   General     Note:    Oropharynx: oropharynx clear of all foreign objects and spontaneously breathing  Level of Consciousness: awake  Oxygen Supplementation: face mask  Level of Supplemental Oxygen (L/min / FiO2): 6  Independent Airway: airway patency satisfactory and stable  Dentition: dentition unchanged  Vital Signs Stable: post-procedure vital signs reviewed and stable  Report to RN Given: handoff report given  Patient transferred to: PACU    Handoff Report: Identifed the Patient, Identified the Reponsible Provider, Reviewed the pertinent medical history, Discussed the surgical course, Reviewed Intra-OP anesthesia mangement and issues during anesthesia, Set expectations for post-procedure period and Allowed opportunity for questions and acknowledgement of understanding      Vitals:  Vitals Value Taken Time   BP     Temp     Pulse     Resp 14    SpO2 99 % 01/15/24 1616   Vitals shown include unfiled device data.    Electronically Signed By: MIKE Reinoso CRNA  January 15, 2024  4:17 PM   S: Pt is a 89 yo female who presents to the ED with CC of RLE cellulitis.  She has taken 1 week of antibx (finished Doxycycline last night) and notices some improvement over the last 2 days.  No fever.          Complete multisystem ROS except as mentioned above negative.  No past medical history on file.  Patient Active Problem List   Diagnosis    Chest pain    Pneumonia    ST elevation myocardial infarction involving right coronary artery (H)    Unstable angina pectoris (H)    CVA (cerebral vascular accident) (H)    Coronary atherosclerosis     Past Surgical History:   Procedure Laterality Date    CV CORONARY ANGIOGRAM N/A 12/21/2020    Procedure: Coronary Angiogram;  Surgeon: Benjie Betancourt MD;  Location:  HEART CARDIAC CATH LAB    CV RIGHT HEART CATH MEASUREMENTS RECORDED N/A 12/21/2020    Procedure: Heart Catheterization with Possible Intervention;  Surgeon: Benjie Betancourt MD;  Location:  HEART CARDIAC CATH LAB    IR ERCP  5/18/2021    IR ERCP  8/14/2020     No family history on file.  Social History     Tobacco Use    Smoking status: Never    Smokeless tobacco: Never   Substance Use Topics    Alcohol use: No    Drug use: No     O:BP (!) 171/77   Pulse 71   Temp 98.6  F (37  C)   Resp 20   SpO2 92%   Gen - pleasant, cooperative, in NAD  Neuro - A&O x 3, CN II-XII intact, gait is steady  HEENT - PERRLA bilaterally, EOMI bilaterally, no conjunctival injection  Resp -  no dyspnea or wheezes  Extr - mild bilateral chronic LE edema  Skin - mild erythema with a few small blistered areas of skin RLE, no warmth      A:   1. Cellulitis of right leg        P:  Continue with 1 more week of the Doxycycline 100mg PO bid       Avoid touching the area of cellulitis       Elevate the legs whenever seated       F/U with PCP as needed     Barbara Avitia DO  05/13/25 0856

## 2025-05-13 NOTE — ED TRIAGE NOTES
Patient took a week of antibiotics for cellulitus in lower right leg. Pain is better over the last 2 days, it has gotten a little better but it is still there.

## 2025-05-13 NOTE — ED NOTES
Patient discharged at this time. AVS reviewed with Rx, return precautions, and follow up care. See media tab for images of skin concern. Declines questions or concerns. Ambulatory with  to home.

## 2025-05-13 NOTE — DISCHARGE INSTRUCTIONS
Take 1 more week of the Doxycycline twice a day.  Avoid touching the area.  Elevate the legs whenever seated.  Followup at clinic as needed.

## 2025-05-13 NOTE — ED TRIAGE NOTES
Chacha CAIN assessed patient in triage and determined patient not Urgent Care appropriate. Will be seen in Emergency Department.  May need a further work up for a skin infection

## 2025-06-06 ENCOUNTER — HOSPITAL ENCOUNTER (EMERGENCY)
Facility: HOSPITAL | Age: OVER 89
Discharge: HOME | End: 2025-06-07
Attending: INTERNAL MEDICINE
Payer: MEDICARE

## 2025-06-06 DIAGNOSIS — R07.9 CHEST PAIN, UNSPECIFIED TYPE: ICD-10-CM

## 2025-06-06 PROCEDURE — 99284 EMERGENCY DEPT VISIT MOD MDM: CPT | Performed by: INTERNAL MEDICINE

## 2025-06-06 PROCEDURE — 99285 EMERGENCY DEPT VISIT HI MDM: CPT

## 2025-06-07 ENCOUNTER — APPOINTMENT (OUTPATIENT)
Dept: GENERAL RADIOLOGY | Facility: HOSPITAL | Age: OVER 89
End: 2025-06-07
Attending: INTERNAL MEDICINE
Payer: MEDICARE

## 2025-06-07 VITALS
TEMPERATURE: 98.9 F | DIASTOLIC BLOOD PRESSURE: 83 MMHG | HEART RATE: 73 BPM | RESPIRATION RATE: 27 BRPM | SYSTOLIC BLOOD PRESSURE: 146 MMHG | OXYGEN SATURATION: 95 %

## 2025-06-07 LAB
ALBUMIN SERPL BCG-MCNC: 3.5 G/DL (ref 3.5–5.2)
ALBUMIN UR-MCNC: 20 MG/DL
ALP SERPL-CCNC: 58 U/L (ref 40–150)
ALT SERPL W P-5'-P-CCNC: 26 U/L (ref 0–50)
ANION GAP SERPL CALCULATED.3IONS-SCNC: 10 MMOL/L (ref 7–15)
APPEARANCE UR: CLEAR
AST SERPL W P-5'-P-CCNC: 33 U/L (ref 0–45)
BASOPHILS # BLD AUTO: 0 10E3/UL (ref 0–0.2)
BASOPHILS NFR BLD AUTO: 0 %
BILIRUB SERPL-MCNC: 0.4 MG/DL
BILIRUB UR QL STRIP: NEGATIVE
BUN SERPL-MCNC: 29.1 MG/DL (ref 8–23)
CALCIUM SERPL-MCNC: 9.5 MG/DL (ref 8.8–10.4)
CHLORIDE SERPL-SCNC: 102 MMOL/L (ref 98–107)
COLOR UR AUTO: ABNORMAL
CREAT SERPL-MCNC: 0.95 MG/DL (ref 0.51–0.95)
CRP SERPL-MCNC: <3 MG/L
EGFRCR SERPLBLD CKD-EPI 2021: 56 ML/MIN/1.73M2
EOSINOPHIL # BLD AUTO: 0.1 10E3/UL (ref 0–0.7)
EOSINOPHIL NFR BLD AUTO: 1 %
ERYTHROCYTE [DISTWIDTH] IN BLOOD BY AUTOMATED COUNT: 13.2 % (ref 10–15)
GLUCOSE SERPL-MCNC: 114 MG/DL (ref 70–99)
GLUCOSE UR STRIP-MCNC: NEGATIVE MG/DL
HCO3 SERPL-SCNC: 24 MMOL/L (ref 22–29)
HCT VFR BLD AUTO: 37.1 % (ref 35–47)
HGB BLD-MCNC: 12.4 G/DL (ref 11.7–15.7)
HGB UR QL STRIP: NEGATIVE
HOLD SPECIMEN: NORMAL
IMM GRANULOCYTES # BLD: 0.1 10E3/UL
IMM GRANULOCYTES NFR BLD: 1 %
KETONES UR STRIP-MCNC: NEGATIVE MG/DL
LACTATE SERPL-SCNC: 0.7 MMOL/L (ref 0.7–2)
LEUKOCYTE ESTERASE UR QL STRIP: NEGATIVE
LYMPHOCYTES # BLD AUTO: 1.8 10E3/UL (ref 0.8–5.3)
LYMPHOCYTES NFR BLD AUTO: 19 %
MCH RBC QN AUTO: 30.9 PG (ref 26.5–33)
MCHC RBC AUTO-ENTMCNC: 33.4 G/DL (ref 31.5–36.5)
MCV RBC AUTO: 93 FL (ref 78–100)
MONOCYTES # BLD AUTO: 1.5 10E3/UL (ref 0–1.3)
MONOCYTES NFR BLD AUTO: 15 %
NEUTROPHILS # BLD AUTO: 6 10E3/UL (ref 1.6–8.3)
NEUTROPHILS NFR BLD AUTO: 63 %
NITRATE UR QL: NEGATIVE
NRBC # BLD AUTO: 0 10E3/UL
NRBC BLD AUTO-RTO: 0 /100
NT-PROBNP SERPL-MCNC: 1661 PG/ML (ref 0–624)
PH UR STRIP: 5.5 [PH] (ref 4.7–8)
PLATELET # BLD AUTO: 232 10E3/UL (ref 150–450)
POTASSIUM SERPL-SCNC: 4.1 MMOL/L (ref 3.4–5.3)
PROT SERPL-MCNC: 6 G/DL (ref 6.4–8.3)
RBC # BLD AUTO: 4.01 10E6/UL (ref 3.8–5.2)
RBC URINE: 1 /HPF
SODIUM SERPL-SCNC: 136 MMOL/L (ref 135–145)
SP GR UR STRIP: 1.02 (ref 1–1.03)
SQUAMOUS EPITHELIAL: 0 /HPF
TROPONIN T SERPL HS-MCNC: 31 NG/L
TROPONIN T SERPL HS-MCNC: 33 NG/L
UROBILINOGEN UR STRIP-MCNC: NORMAL MG/DL
WBC # BLD AUTO: 9.5 10E3/UL (ref 4–11)
WBC URINE: 1 /HPF

## 2025-06-07 PROCEDURE — 84484 ASSAY OF TROPONIN QUANT: CPT | Performed by: INTERNAL MEDICINE

## 2025-06-07 PROCEDURE — 71045 X-RAY EXAM CHEST 1 VIEW: CPT

## 2025-06-07 PROCEDURE — 80053 COMPREHEN METABOLIC PANEL: CPT | Performed by: INTERNAL MEDICINE

## 2025-06-07 PROCEDURE — 71045 X-RAY EXAM CHEST 1 VIEW: CPT | Mod: 26 | Performed by: RADIOLOGY

## 2025-06-07 PROCEDURE — 93005 ELECTROCARDIOGRAM TRACING: CPT

## 2025-06-07 PROCEDURE — 250N000011 HC RX IP 250 OP 636: Performed by: INTERNAL MEDICINE

## 2025-06-07 PROCEDURE — 36415 COLL VENOUS BLD VENIPUNCTURE: CPT | Performed by: INTERNAL MEDICINE

## 2025-06-07 PROCEDURE — 93010 ELECTROCARDIOGRAM REPORT: CPT | Performed by: INTERNAL MEDICINE

## 2025-06-07 PROCEDURE — 83605 ASSAY OF LACTIC ACID: CPT | Performed by: INTERNAL MEDICINE

## 2025-06-07 PROCEDURE — 250N000013 HC RX MED GY IP 250 OP 250 PS 637: Performed by: INTERNAL MEDICINE

## 2025-06-07 PROCEDURE — 81003 URINALYSIS AUTO W/O SCOPE: CPT | Performed by: INTERNAL MEDICINE

## 2025-06-07 PROCEDURE — 86140 C-REACTIVE PROTEIN: CPT | Performed by: INTERNAL MEDICINE

## 2025-06-07 PROCEDURE — 96374 THER/PROPH/DIAG INJ IV PUSH: CPT

## 2025-06-07 PROCEDURE — 85025 COMPLETE CBC W/AUTO DIFF WBC: CPT | Performed by: INTERNAL MEDICINE

## 2025-06-07 PROCEDURE — 83880 ASSAY OF NATRIURETIC PEPTIDE: CPT | Performed by: INTERNAL MEDICINE

## 2025-06-07 RX ORDER — ONDANSETRON 2 MG/ML
4 INJECTION INTRAMUSCULAR; INTRAVENOUS ONCE
Status: COMPLETED | OUTPATIENT
Start: 2025-06-07 | End: 2025-06-07

## 2025-06-07 RX ORDER — MAGNESIUM HYDROXIDE/ALUMINUM HYDROXICE/SIMETHICONE 120; 1200; 1200 MG/30ML; MG/30ML; MG/30ML
30 SUSPENSION ORAL ONCE
Status: COMPLETED | OUTPATIENT
Start: 2025-06-07 | End: 2025-06-07

## 2025-06-07 RX ADMIN — ONDANSETRON 4 MG: 2 INJECTION INTRAMUSCULAR; INTRAVENOUS at 01:07

## 2025-06-07 RX ADMIN — ALUMINUM HYDROXIDE, MAGNESIUM HYDROXIDE, AND SIMETHICONE 30 ML: 200; 200; 20 SUSPENSION ORAL at 03:26

## 2025-06-07 ASSESSMENT — ACTIVITIES OF DAILY LIVING (ADL)
ADLS_ACUITY_SCORE: 47

## 2025-06-07 NOTE — ED NOTES
Bed: ED04  Expected date:   Expected time:   Means of arrival:   Comments:  Todd - left shoulder pain

## 2025-06-07 NOTE — ED TRIAGE NOTES
Pt comes in with [facility] due to having right shoulder pain that goes up her neck and down to right fingers. Pt took 2 nitroglycerin at home and x3 81mg Asprin and another nitroglycerin given by [facility].  Pt is currently have anterior right sided chest pain rated at 5/10.

## 2025-06-09 LAB
ATRIAL RATE - MUSE: 57 BPM
DIASTOLIC BLOOD PRESSURE - MUSE: NORMAL MMHG
INTERPRETATION ECG - MUSE: NORMAL
P AXIS - MUSE: 46 DEGREES
PR INTERVAL - MUSE: 212 MS
QRS DURATION - MUSE: 92 MS
QT - MUSE: 410 MS
QTC - MUSE: 399 MS
R AXIS - MUSE: -42 DEGREES
SYSTOLIC BLOOD PRESSURE - MUSE: NORMAL MMHG
T AXIS - MUSE: 45 DEGREES
VENTRICULAR RATE- MUSE: 57 BPM

## 2025-06-11 ASSESSMENT — ENCOUNTER SYMPTOMS
CHEST TIGHTNESS: 0
LIGHT-HEADEDNESS: 0
MYALGIAS: 0
CHILLS: 0
NUMBNESS: 0
VOICE CHANGE: 0
ABDOMINAL DISTENTION: 0
COUGH: 0
SHORTNESS OF BREATH: 0
FEVER: 0
COLOR CHANGE: 0
WOUND: 0
VOMITING: 0
HEADACHES: 0
CONFUSION: 0
BACK PAIN: 0
HEMATURIA: 0
DYSURIA: 0
NECK PAIN: 0
PALPITATIONS: 0
NECK STIFFNESS: 0
WHEEZING: 0
ARTHRALGIAS: 0
BLOOD IN STOOL: 0
ANAL BLEEDING: 0
ABDOMINAL PAIN: 0
DIZZINESS: 0
DIAPHORESIS: 0
FLANK PAIN: 0
NAUSEA: 0

## 2025-06-11 NOTE — ED PROVIDER NOTES
History     Chief Complaint   Patient presents with    Chest Pain    Shoulder Pain     Right       The history is provided by the patient.   Chest Pain  Pain location:  R chest  Pain radiates to:  R arm and R shoulder  Pain severity:  Moderate  Onset quality:  Gradual  Duration:  2 hours  Timing:  Constant  Progression:  Improving  Chronicity:  Recurrent  Associated symptoms: no abdominal pain, no back pain, no cough, no diaphoresis, no dizziness, no fever, no headache, no nausea, no numbness, no palpitations, no shortness of breath and no vomiting        Allergies:  Allergies   Allergen Reactions    Atorvastatin     Celebrex [Celecoxib] Nausea and Vomiting    Codeine     Codeine Phosphate     Ibuprofen     Ibuprofen Sodium     Influenza Virus Vaccine     Latex Hives    Lisinopril Cough    Morphine And Codeine     Pcn [Penicillins]     Welchol [Colesevelam]     Niaspan [Niacin] Rash     Upset Stomach       Problem List:    Patient Active Problem List    Diagnosis Date Noted    CVA (cerebral vascular accident) (H) 07/09/2023     Priority: Medium    Coronary atherosclerosis 07/09/2023     Priority: Medium    Unstable angina pectoris (H) 12/20/2020     Priority: Medium     Added automatically from request for surgery [id number]      Pneumonia 08/02/2018     Priority: Medium    ST elevation myocardial infarction involving right coronary artery (H) 08/02/2018     Priority: Medium    Chest pain 05/28/2016     Priority: Medium        Past Medical History:    No past medical history on file.    Past Surgical History:    Past Surgical History:   Procedure Laterality Date    CV CORONARY ANGIOGRAM N/A 12/21/2020    Procedure: Coronary Angiogram;  Surgeon: Benjie Betancourt MD;  Location:  HEART CARDIAC CATH LAB    CV RIGHT HEART CATH MEASUREMENTS RECORDED N/A 12/21/2020    Procedure: Heart Catheterization with Possible Intervention;  Surgeon: Benjie Betancourt MD;  Location:  HEART CARDIAC CATH LAB    IR ERCP   5/18/2021    IR ERCP  8/14/2020       Family History:    No family history on file.    Social History:  Marital Status:   [2]  Social History     Tobacco Use    Smoking status: Never    Smokeless tobacco: Never   Substance Use Topics    Alcohol use: No    Drug use: No        Medications:    acetaminophen (TYLENOL) 325 MG tablet  amLODIPine (NORVASC) 5 MG tablet  aspirin 81 MG tablet  carvedilol (COREG) 12.5 MG tablet  clopidogrel (PLAVIX) 75 MG tablet  Cyanocobalamin 500 MCG TBDP  folic acid (FOLVITE) 1 MG tablet  furosemide (LASIX) 20 MG tablet  metoprolol tartrate (LOPRESSOR) 50 MG tablet  nitroGLYcerin (NITROSTAT) 0.4 MG sublingual tablet  polyethylene glycol (MIRALAX/GLYCOLAX) Packet  rosuvastatin (CRESTOR) 40 MG tablet  thyroid (ARMOUR) 60 MG tablet  vitamin D3 (CHOLECALCIFEROL) 10 MCG (400 UNIT) capsule          Review of Systems   Constitutional:  Negative for chills, diaphoresis and fever.   HENT:  Negative for voice change.    Eyes:  Negative for visual disturbance.   Respiratory:  Negative for cough, chest tightness, shortness of breath and wheezing.    Cardiovascular:  Positive for chest pain. Negative for palpitations and leg swelling.   Gastrointestinal:  Negative for abdominal distention, abdominal pain, anal bleeding, blood in stool, nausea and vomiting.   Genitourinary:  Negative for decreased urine volume, dysuria, flank pain and hematuria.   Musculoskeletal:  Negative for arthralgias, back pain, gait problem, myalgias, neck pain and neck stiffness.   Skin:  Negative for color change, pallor, rash and wound.   Neurological:  Negative for dizziness, syncope, light-headedness, numbness and headaches.   Psychiatric/Behavioral:  Negative for confusion and suicidal ideas.        Physical Exam   BP: 132/81  Pulse: 58  Temp: 98.9  F (37.2  C)  Resp: 21  SpO2: 96 %      Physical Exam  Vitals and nursing note reviewed.   Constitutional:       Appearance: She is well-developed.   HENT:      Head:  Normocephalic and atraumatic.      Mouth/Throat:      Pharynx: No oropharyngeal exudate.   Eyes:      Conjunctiva/sclera: Conjunctivae normal.      Pupils: Pupils are equal, round, and reactive to light.   Neck:      Thyroid: No thyromegaly.      Vascular: No JVD.      Trachea: No tracheal deviation.   Cardiovascular:      Rate and Rhythm: Normal rate and regular rhythm.      Heart sounds: Normal heart sounds. No murmur heard.     No friction rub. No gallop.   Pulmonary:      Effort: Pulmonary effort is normal. No respiratory distress.      Breath sounds: Normal breath sounds. No stridor. No wheezing or rales.   Chest:      Chest wall: No tenderness.   Abdominal:      General: Bowel sounds are normal. There is no distension.      Palpations: Abdomen is soft. There is no mass.      Tenderness: There is no abdominal tenderness. There is no guarding or rebound.   Musculoskeletal:         General: No tenderness. Normal range of motion.      Cervical back: Normal range of motion and neck supple.   Lymphadenopathy:      Cervical: No cervical adenopathy.   Skin:     General: Skin is warm and dry.      Coloration: Skin is not pale.      Findings: No erythema or rash.   Neurological:      Mental Status: She is alert and oriented to person, place, and time.   Psychiatric:         Behavior: Behavior normal.         ED Course        Procedures                No results found for this or any previous visit (from the past 24 hours).    Medications   ondansetron (ZOFRAN) injection 4 mg (4 mg Intravenous $Given 6/7/25 0107)   alum & mag hydroxide-simethicone (MAALOX) suspension 30 mL (30 mLs Oral $Given 6/7/25 0326)       Assessments & Plan (with Medical Decision Making)   Right sided chest pain  EKG sinus mark  Labs reviewed  Trop x 2 stable    Later developed heart burn that responded to Maalox   All symptoms resolved  D C Home follow-up with PCP  I have reviewed the nursing notes.    I have reviewed the findings, diagnosis,  plan and need for follow up with the patient.          Discharge Medication List as of 6/7/2025  4:00 AM          Final diagnoses:   Chest pain, unspecified type       6/6/2025   [facility]       Ayan Mccoy MD  06/11/25 1839

## 2025-07-14 ENCOUNTER — OFFICE VISIT (OUTPATIENT)
Dept: WOUND CARE | Facility: OTHER | Age: OVER 89
End: 2025-07-14
Attending: PODIATRIST
Payer: MEDICARE

## 2025-07-14 VITALS
TEMPERATURE: 97.5 F | DIASTOLIC BLOOD PRESSURE: 77 MMHG | SYSTOLIC BLOOD PRESSURE: 160 MMHG | HEART RATE: 68 BPM | OXYGEN SATURATION: 95 %

## 2025-07-14 DIAGNOSIS — I83.018: Primary | ICD-10-CM

## 2025-07-14 PROCEDURE — G0463 HOSPITAL OUTPT CLINIC VISIT: HCPCS

## 2025-07-14 ASSESSMENT — PAIN SCALES - GENERAL: PAINLEVEL_OUTOF10: NO PAIN (0)

## 2025-07-14 NOTE — PROGRESS NOTES
"SUBJECTIVE:  Kary Moody, 89 year old, female presents with the following Chief Complaint(s) with HPI to follow:  Chief Complaint   Patient presents with    WOUND CARE     Venous stasis ulcer of right lower leg        Wound care    HPI:  Kary is here today for the reassessment and treatment of RLE wounds.  Back story:  Kary isn't new to [place]  She's new to wound care  Started in April 4/28/25: seen at [facility] [place] [facility] for cellulitis--see note. Tx: Keflex  5/6/25: seen at [facility] for cellulitis and new blister--see note. Tx: Doxycycline   5/13/25: seen at [facility] for cellulitis--see note. Tx: Doxycycline extended  5/20/25: seen at [facility] by Judith Knenedy PA-C--see note. Tx: increase Lasix, Bactrim, abd pad + ace wrap. Wound care referral  6/4/25: seen at [facility] by Judith Kennedy PA-C--see note. \"Better\"  6/7/25: seen at [facility] for chest pain  7/1/25: seen at [facility] by Dr. Sanchez for worsening cellulitis--see note. Tx: Bactrim DS  7/14/25: seeing myself today   Current treatment: bandaid  Denies any fevers, chills, and/or malodorous drainage.   Wound pain is better  PMH: see Epic  No hx of smoking  No hx of diabetes    Patient Active Problem List   Diagnosis    Chest pain    Pneumonia    ST elevation myocardial infarction involving right coronary artery (H)    Unstable angina pectoris (H)    CVA (cerebral vascular accident) (H)    Coronary atherosclerosis       History reviewed. No pertinent past medical history.    Past Surgical History:   Procedure Laterality Date    CV CORONARY ANGIOGRAM N/A 12/21/2020    Procedure: Coronary Angiogram;  Surgeon: Benjie Betancourt MD;  Location:  HEART CARDIAC CATH LAB    CV RIGHT HEART CATH MEASUREMENTS RECORDED N/A 12/21/2020    Procedure: Heart Catheterization with Possible Intervention;  Surgeon: Benjie Betancourt MD;  Location:  HEART CARDIAC CATH " LAB    IR ERCP  5/18/2021    IR ERCP  8/14/2020       History reviewed. No pertinent family history.    Social History     Tobacco Use    Smoking status: Never    Smokeless tobacco: Never   Substance Use Topics    Alcohol use: No       Current Outpatient Medications   Medication Sig Dispense Refill    acetaminophen (TYLENOL) 325 MG tablet Take 975 mg by mouth every 6 hours as needed for mild pain      amLODIPine (NORVASC) 5 MG tablet Take 5 mg by mouth daily      aspirin 81 MG tablet Take 81 mg by mouth daily      carvedilol (COREG) 12.5 MG tablet Take 1 tablet by mouth 2 times daily (with meals).      clopidogrel (PLAVIX) 75 MG tablet Take 75 mg by mouth daily       Cyanocobalamin 500 MCG TBDP Take 500 mcg by mouth daily       folic acid (FOLVITE) 1 MG tablet Take 1 mg by mouth daily       furosemide (LASIX) 20 MG tablet Take 20 mg by mouth every 48 hours.      metoprolol tartrate (LOPRESSOR) 50 MG tablet Take 100 mg in the morning and 50 mg in the evening      nitroGLYcerin (NITROSTAT) 0.4 MG sublingual tablet Place 1 tablet under the tongue every 5 minutes as needed for chest pain      polyethylene glycol (MIRALAX/GLYCOLAX) Packet Take 1 packet by mouth daily      rosuvastatin (CRESTOR) 40 MG tablet Take 40 mg by mouth daily      thyroid (ARMOUR) 60 MG tablet Take 60 mg by mouth daily       vitamin D3 (CHOLECALCIFEROL) 10 MCG (400 UNIT) capsule Take 1 capsule by mouth daily         Allergies   Allergen Reactions    Atorvastatin     Celebrex [Celecoxib] Nausea and Vomiting    Codeine     Codeine Phosphate     Ibuprofen     Ibuprofen Sodium     Influenza Virus Vaccine     Latex Hives    Lisinopril Cough    Morphine And Codeine     Pcn [Penicillins]     Welchol [Colesevelam]     Niaspan [Niacin] Rash     Upset Stomach       REVIEW OF SYSTEMS  Skin: as stated above  Eyes: negative  Ears/Nose/Throat: hx of [place]  Respiratory: No shortness of breath, dyspnea on exertion, cough, or hemoptysis  Cardiovascular: negative; hx  of two MI, two strokes, HF with decreased EF  Gastrointestinal: negative  Genitourinary: negative  Musculoskeletal: positive for arthritis  Neurologic: negative  Psychiatric: negative  Hematologic/Lymphatic/Immunologic: negative  Endocrine: negative    OBJECTIVE:  BP (!) 160/77 (BP Location: Left arm, Patient Position: Sitting, Cuff Size: Adult Regular)   Pulse 68   Temp 97.5  F (36.4  C) (Tympanic)   SpO2 95%   Constitutional: alert and no distress  Respiratory:  Good diaphragmatic excursion.   Musculoskeletal: extremities normal- no gross deformities noted  Skin:   Wound description:   Type of Wound- venous  Location-RLE  Thickness: partial  Measurements: 1 x 0.6 cm  Tunnel: n/a  Undermining: n/a  Wound bed- pink, moist  Surrounding skin- slight erythema--no increase heat and/or lymphangitis; chronic venous stasis changes; +3/+4 pitting edema    Drainage amount- mod  Drainage color- serous  Odor- none  Dressing change:   Washed with mild soap and water, dried  Vashe, dried  Silver foam  Tape  Tubigrip--base of toe to bend of knee  Wound debridement completed on: 7/14/25 debridement type: mechanical with dry gauze    Lower Extremity Perfusion Assessment:   Right lower extremity:  Warmth: yes  Dorsal pedal pulse: yes, via doppler--biphasic              Posterior tibial pulse: yes, via doppler--biphasic              Skin color: pink              Edema: yes, +3/+4  Intervention: will check TONI if any issues with healing     Psychiatric: mentation appears normal and affect normal/bright    LABS  No results found for any visits on 07/14/25.    ASSESSMENT / PLAN:  (I83.018) Venous stasis ulcer of right lower leg (H)  (primary encounter diagnosis)  Comment: noted  Plan:   Changed wound care to the following:  Shower as previous done (friendly reminder, don't soak the wound)--wash wound with mild soap and water, dry  Cleanse with wound wash (vashe), dry  Apply silver foam (cut to fit, please remove plastic backing  first)  Secure with tape  Change every 3 days or as needed if dressing gets wet  Wear the provided compression sock (tubigrip)--base of toes to bend of knee    Follow up  One week    Treatment goal:  Promote healing  Prevent infection    Patient Instructions   Wash hands prior to dressing change.   Clean instruments as appropriate    Right leg wound:  Shower as previous done (friendly reminder, don't soak the wound)--wash wound with mild soap and water, dry  Cleanse with wound wash (vashe), dry  Apply silver foam (cut to fit, please remove plastic backing first)  Secure with tape  Change every 3 days or as needed if dressing gets wet  Wear the provided compression sock (tubigrip)--base of toes to bend of knee    Please call if any questions/concerns and/or problems ([phone])    Follow up   One week    Increase protein if able       Time: 25 min  Barrier: none  Willingness to learn: accepting    Ashlyn TIAN FNP-BC  Diabetes and Wound Care    Cc: Dr. Tariq

## 2025-07-14 NOTE — PATIENT INSTRUCTIONS
Wash hands prior to dressing change.   Clean instruments as appropriate    Right leg wound:  Shower as previous done (friendly reminder, don't soak the wound)--wash wound with mild soap and water, dry  Cleanse with wound wash (vashe), dry  Apply silver foam (cut to fit, please remove plastic backing first)  Secure with tape  Change every 3 days or as needed if dressing gets wet  Wear the provided compression sock (tubigrip)--base of toes to bend of knee    Please call if any questions/concerns and/or problems ([phone])    Follow up   One week    Increase protein if able

## 2025-07-21 ENCOUNTER — OFFICE VISIT (OUTPATIENT)
Dept: WOUND CARE | Facility: OTHER | Age: OVER 89
End: 2025-07-21
Attending: NURSE PRACTITIONER
Payer: MEDICARE

## 2025-07-21 VITALS
TEMPERATURE: 98.2 F | OXYGEN SATURATION: 94 % | DIASTOLIC BLOOD PRESSURE: 80 MMHG | HEART RATE: 68 BPM | HEIGHT: 63 IN | SYSTOLIC BLOOD PRESSURE: 164 MMHG | WEIGHT: 175 LBS | BODY MASS INDEX: 31.01 KG/M2

## 2025-07-21 DIAGNOSIS — I10 BENIGN ESSENTIAL HYPERTENSION: ICD-10-CM

## 2025-07-21 DIAGNOSIS — I83.018: Primary | ICD-10-CM

## 2025-07-21 ASSESSMENT — PAIN SCALES - GENERAL: PAINLEVEL_OUTOF10: NO PAIN (0)

## 2025-07-21 NOTE — PROGRESS NOTES
"SUBJECTIVE:  Kary Mooyd, 89 year old, female presents with the following Chief Complaint(s) with HPI to follow:  Chief Complaint   Patient presents with    Follow Up     Venous stasis ulcer of right lower leg         Wound care    HPI:  Kary is here today with her  (Jake) for the reassessment and treatment of RLE wounds.  Back story:  Kary isn't new to [place]  She's new to wound care  Started in April 4/28/25: seen at [facility] for cellulitis--see note. Tx: Keflex  5/6/25: seen at [facility] for cellulitis and new blister--see note. Tx: Doxycycline   5/13/25: seen at [facility] for cellulitis--see note. Tx: Doxycycline extended  5/20/25: seen at [facility] by Judith Kennedy PA-C--see note. Tx: increase Lasix, Bactrim, abd pad + ace wrap. Wound care referral  6/4/25: seen at [facility] by Judith Kennedy PA-C--see note. \"Better\"  6/7/25: seen at [facility] for chest pain  7/1/25: seen at [facility] by Dr. Sanchez for worsening cellulitis--see note. Tx: Bactrim DS  Past tx: bandaid  7/14/25: saw myself. Tx: vashe, silver foam  7/21/25: seeing myself today   No issues with dressing changes   No issues with the tubigrip  Denies any fevers, chills, and/or malodorous drainage.   Wound pain is better  PMH: see Epic  No hx of smoking  No hx of diabetes    Noted BP  Denies any symptoms    Patient Active Problem List   Diagnosis    Chest pain    Pneumonia    ST elevation myocardial infarction involving right coronary artery (H)    Unstable angina pectoris (H)    CVA (cerebral vascular accident) (H)    Coronary atherosclerosis       History reviewed. No pertinent past medical history.    Past Surgical History:   Procedure Laterality Date    CV CORONARY ANGIOGRAM N/A 12/21/2020    Procedure: Coronary Angiogram;  Surgeon: Benjie Betancourt MD;  Location:  [facility]    CV RIGHT HEART CATH MEASUREMENTS " RECORDED N/A 12/21/2020    Procedure: Heart Catheterization with Possible Intervention;  Surgeon: Benjie Betancourt MD;  Location:  [facility]    IR ERCP  5/18/2021    IR ERCP  8/14/2020       History reviewed. No pertinent family history.    Social History     Tobacco Use    Smoking status: Never    Smokeless tobacco: Never   Substance Use Topics    Alcohol use: No       Current Outpatient Medications   Medication Sig Dispense Refill    acetaminophen (TYLENOL) 325 MG tablet Take 975 mg by mouth every 6 hours as needed for mild pain      amLODIPine (NORVASC) 5 MG tablet Take 5 mg by mouth daily      aspirin 81 MG tablet Take 81 mg by mouth daily      carvedilol (COREG) 12.5 MG tablet Take 1 tablet by mouth 2 times daily (with meals).      clopidogrel (PLAVIX) 75 MG tablet Take 75 mg by mouth daily       Cyanocobalamin 500 MCG TBDP Take 500 mcg by mouth daily       folic acid (FOLVITE) 1 MG tablet Take 1 mg by mouth daily       furosemide (LASIX) 20 MG tablet Take 20 mg by mouth every 48 hours.      metoprolol tartrate (LOPRESSOR) 50 MG tablet Take 100 mg in the morning and 50 mg in the evening      nitroGLYcerin (NITROSTAT) 0.4 MG sublingual tablet Place 1 tablet under the tongue every 5 minutes as needed for chest pain      polyethylene glycol (MIRALAX/GLYCOLAX) Packet Take 1 packet by mouth daily      rosuvastatin (CRESTOR) 40 MG tablet Take 40 mg by mouth daily      thyroid (ARMOUR) 60 MG tablet Take 60 mg by mouth daily       vitamin D3 (CHOLECALCIFEROL) 10 MCG (400 UNIT) capsule Take 1 capsule by mouth daily         Allergies   Allergen Reactions    Atorvastatin     Celebrex [Celecoxib] Nausea and Vomiting    Codeine     Codeine Phosphate     Ibuprofen     Ibuprofen Sodium     Influenza Virus Vaccine     Latex Hives    Lisinopril Cough    Morphine And Codeine     Pcn [Penicillins]     Welchol [Colesevelam]     Niaspan [Niacin] Rash     Upset Stomach       REVIEW OF SYSTEMS  Skin: as stated  "above  Eyes: negative  Ears/Nose/Throat: hx of [place] IRA  Respiratory: No shortness of breath, dyspnea on exertion, cough, or hemoptysis  Cardiovascular: negative; hx of two MI, two strokes, HF with decreased EF  Gastrointestinal: negative  Genitourinary: negative  Musculoskeletal: positive for arthritis  Neurologic: negative  Psychiatric: negative  Hematologic/Lymphatic/Immunologic: negative  Endocrine: negative    OBJECTIVE:  BP (!) 164/80   Pulse 68   Temp 98.2  F (36.8  C) (Tympanic)   Ht 1.588 m (5' 2.5\")   Wt 79.4 kg (175 lb)   SpO2 94%   BMI 31.50 kg/m    Constitutional: alert and no distress  Respiratory:  Good diaphragmatic excursion.   Musculoskeletal: extremities normal- no gross deformities noted  Skin:   Wound description:   Type of Wound- venous  Location-RLE  Thickness: partial  Measurements: thinly healed    Tunnel: n/a  Undermining: n/a  Wound bed- see picture  Surrounding skin- slight erythema--no increase heat and/or lymphangitis; chronic venous stasis changes; +2/+3 pitting edema    Drainage amount- scant  Drainage color- serous  Odor- none  Dressing change:   Vashe, dried  Silver foam  Tape  Tubigrip--base of toe to bend of knee  Wound debridement completed on: 7/21/25 debridement type: mechanical with dry gauze  Lower Extremity Perfusion Assessment:   Right lower extremity:  Warmth: yes  Dorsal pedal pulse: yes, via doppler--biphasic              Posterior tibial pulse: yes, via doppler--biphasic              Skin color: pink              Edema: yes, +3/+4  Intervention: will check TONI if any issues with healing     Psychiatric: mentation appears normal and affect normal/bright    LABS  No results found for any visits on 07/21/25.    ASSESSMENT / PLAN:  (I83.018) Venous stasis ulcer of right lower leg (H)  (primary encounter diagnosis)  Comment: noted--thinly healed   Plan:   Changed wound care to the following:  Shower as previous done (friendly reminder, don't soak the wound)--wash wound with " mild soap and water, dry  Cleanse with wound wash (vashe), dry  Apply silver foam (cut to fit, please remove plastic backing first)  Secure with tape  Change every 3 days or as needed if dressing gets wet  Wear the provided compression sock (tubigrip)--base of toes to bend of knee  Do this until no drainage is on the dressing    Post wound care  Lotion  Compression     (I10) Benign essential hypertension  Comment: noted, no symptoms  Plan:   If able, check BP at home     Follow up  As needed    Treatment goal:  Prevent reopening     Patient Instructions   Wash hands prior to dressing change.   Clean instruments as appropriate    Right leg wound:  Shower as previous done (friendly reminder, don't soak the wound)--wash wound with mild soap and water, dry  Cleanse with wound wash (vashe), dry  Apply silver foam (cut to fit, please remove plastic backing first)  Secure with tape  Change every 3 days or as needed if dressing gets wet  Wear the provided compression sock (tubigrip)--base of toes to bend of knee  Do this until no drainage    Post wound care:  Lotion   Compression     Please call if any questions/concerns and/or problems ([phone])    Follow up   As needed     Increase protein if able     High blood pressure  If able, check BP at home  Report any symptoms     Time: 20 min  Barrier: none  Willingness to learn: accepting    Ashlyn TIAN FNP-BC  Diabetes and Wound Care    Cc: Dr. Tariq

## 2025-07-21 NOTE — PATIENT INSTRUCTIONS
Wash hands prior to dressing change.   Clean instruments as appropriate    Right leg wound:  Shower as previous done (friendly reminder, don't soak the wound)--wash wound with mild soap and water, dry  Cleanse with wound wash (vashe), dry  Apply silver foam (cut to fit, please remove plastic backing first)  Secure with tape  Change every 3 days or as needed if dressing gets wet  Wear the provided compression sock (tubigrip)--base of toes to bend of knee  Do this until no drainage    Post wound care:  Lotion   Compression     Please call if any questions/concerns and/or problems ([phone])    Follow up   As needed     Increase protein if able     High blood pressure  If able, check BP at home  Report any symptoms

## (undated) DEVICE — Device

## (undated) DEVICE — KIT HAND CONTROL ANGIOTOUCH ACIST 65CM AT-P65

## (undated) DEVICE — SLEEVE TR BAND RADIAL COMPRESSION DEVICE 24CM TRB24-REG

## (undated) DEVICE — TOTE ANGIO CORP PC15AT SAN32CC83O

## (undated) DEVICE — GW VASC 190CM .014IN HI-TRQ 1009660J

## (undated) DEVICE — CATH DIAGNOSTIC RADIAL 5FR TIG 4.0

## (undated) DEVICE — MANIFOLD KIT ANGIO AUTOMATED 014613

## (undated) DEVICE — MEDITRACE MULTIFUNTION ADULT RADIOTRANSPARENT ELECTRODE FOR ZOLL

## (undated) DEVICE — INTRO GLIDESHEATH SLENDER 6FR 10X45CM 60-1060

## (undated) RX ORDER — FENTANYL CITRATE 50 UG/ML
INJECTION, SOLUTION INTRAMUSCULAR; INTRAVENOUS
Status: DISPENSED
Start: 2020-12-21

## (undated) RX ORDER — VERAPAMIL HYDROCHLORIDE 2.5 MG/ML
INJECTION, SOLUTION INTRAVENOUS
Status: DISPENSED
Start: 2020-12-21

## (undated) RX ORDER — HEPARIN SODIUM 1000 [USP'U]/ML
INJECTION, SOLUTION INTRAVENOUS; SUBCUTANEOUS
Status: DISPENSED
Start: 2020-12-21

## (undated) RX ORDER — LIDOCAINE HYDROCHLORIDE 10 MG/ML
INJECTION, SOLUTION EPIDURAL; INFILTRATION; INTRACAUDAL; PERINEURAL
Status: DISPENSED
Start: 2020-12-21

## (undated) RX ORDER — NITROGLYCERIN 5 MG/ML
VIAL (ML) INTRAVENOUS
Status: DISPENSED
Start: 2020-12-21

## (undated) RX ORDER — HEPARIN SODIUM 200 [USP'U]/100ML
INJECTION, SOLUTION INTRAVENOUS
Status: DISPENSED
Start: 2020-12-21